# Patient Record
Sex: MALE | Race: WHITE | NOT HISPANIC OR LATINO | Employment: OTHER | URBAN - METROPOLITAN AREA
[De-identification: names, ages, dates, MRNs, and addresses within clinical notes are randomized per-mention and may not be internally consistent; named-entity substitution may affect disease eponyms.]

---

## 2018-06-04 ENCOUNTER — RADIANT APPOINTMENT (OUTPATIENT)
Dept: GENERAL RADIOLOGY | Facility: OTHER | Age: 48
End: 2018-06-04
Attending: PHYSICIAN ASSISTANT
Payer: COMMERCIAL

## 2018-06-04 ENCOUNTER — OFFICE VISIT (OUTPATIENT)
Dept: FAMILY MEDICINE | Facility: OTHER | Age: 48
End: 2018-06-04
Payer: COMMERCIAL

## 2018-06-04 VITALS
TEMPERATURE: 98.2 F | SYSTOLIC BLOOD PRESSURE: 120 MMHG | DIASTOLIC BLOOD PRESSURE: 82 MMHG | BODY MASS INDEX: 31.57 KG/M2 | RESPIRATION RATE: 18 BRPM | WEIGHT: 246 LBS | HEIGHT: 74 IN | HEART RATE: 76 BPM

## 2018-06-04 DIAGNOSIS — G89.29 CHRONIC PAIN OF RIGHT KNEE: ICD-10-CM

## 2018-06-04 DIAGNOSIS — M25.561 CHRONIC PAIN OF RIGHT KNEE: ICD-10-CM

## 2018-06-04 DIAGNOSIS — G89.29 CHRONIC PAIN OF RIGHT KNEE: Primary | ICD-10-CM

## 2018-06-04 DIAGNOSIS — M23.91 INTERNAL DERANGEMENT OF KNEE, RIGHT: ICD-10-CM

## 2018-06-04 DIAGNOSIS — M25.561 CHRONIC PAIN OF RIGHT KNEE: Primary | ICD-10-CM

## 2018-06-04 PROCEDURE — 73565 X-RAY EXAM OF KNEES: CPT | Mod: FY

## 2018-06-04 PROCEDURE — 99214 OFFICE O/P EST MOD 30 MIN: CPT | Performed by: PHYSICIAN ASSISTANT

## 2018-06-04 PROCEDURE — 73560 X-RAY EXAM OF KNEE 1 OR 2: CPT | Mod: RT

## 2018-06-04 ASSESSMENT — PAIN SCALES - GENERAL: PAINLEVEL: MODERATE PAIN (4)

## 2018-06-04 NOTE — PROGRESS NOTES
SUBJECTIVE:   Carlos Keyes is a 47 year old male who presents to clinic today for the following health issues:    HPI  Joint Pain    Onset: July 2017    Description:   Location: Right leg  Character: Dull ache, Stabbing and Seems to give out    Intensity: mild, severe    Progression of Symptoms: intermittent    Accompanying Signs & Symptoms:  Other symptoms: warmth, swelling, redness and Previous discoloration    History:   Previous similar pain: YES      Precipitating factors:   Trauma or overuse: YES    Alleviating factors:  Improved by: rest/inactivity    Therapies Tried and outcome: ibuprofen, Ice,       Problem list and histories reviewed & adjusted, as indicated.  Additional history: Recently had a rapid onset of a sharp stabbing pain to the medial aspect of his right knee while walking on level ground.  Uncertain as to overall reason for this and it puzzles them.  He has since then had pain in the knee and has had some swelling as well the posterior aspect of the knee is more involved.    Patient Active Problem List   Diagnosis     HYPERLIPIDEMIA LDL GOAL <130     Family history of ischemic heart disease     Epididymitis     Past Surgical History:   Procedure Laterality Date     FRACTURE TX, ANKLE RT/LT      Fracture TX Ankle RT/LT     HERNIA REPAIR, INGUINAL RT/LT  10/13/2006    Laparoscopic left inguinal hernia repair.     OPEN REDUCTION INTERNAL FIXATION FOREARM Right 11/5/2014    Procedure: OPEN REDUCTION INTERNAL FIXATION FOREARM;  Surgeon: Seth Basilio MD;  Location: [facility]     REPAIR HAMMER TOE  10/14/2011    Procedure:REPAIR HAMMER TOE; Correction Toes 4th,5th Toe Right Foot; Surgeon:ASIF CRUM; Location:[facility]     VASECTOMY Bilateral 05/06/05       Social History   Substance Use Topics     Smoking status: Never Smoker     Smokeless tobacco: Former User     Types: Chew     Alcohol use Yes      Comment: Occ     Family History   Problem Relation Age of Onset     C.A.D. Mother       "PAIGE Maternal Grandfather      DIABETES No family hx of      Cancer - colorectal No family hx of          Current Outpatient Prescriptions   Medication Sig Dispense Refill     albuterol (PROAIR HFA/PROVENTIL HFA/VENTOLIN HFA) 108 (90 BASE) MCG/ACT Inhaler Inhale 1-2 puffs into the lungs every 4 hours as needed for shortness of breath / dyspnea or wheezing 1 Inhaler 0     ibuprofen (ADVIL/MOTRIN) 800 MG tablet Take 1 tablet (800 mg) by mouth every 8 hours as needed for moderate pain 100 tablet 1     Allergies   Allergen Reactions     No Known Drug Allergies      Recent Labs   Lab Test  06/22/15   0928  11/27/14   1111  11/25/14   1355  01/14/14   0816  07/26/13   0923  09/11/12   0821   LDL  118   --    --   87  93   --    HDL  68   --    --   64  58   --    TRIG  191*   --    --   109  169*   --    ALT   --   17  22   --    --   48   CR   --    --   0.84   --    --    --    GFRESTIMATED   --    --   >90  Non  GFR Calc     --    --    --    GFRESTBLACK   --    --   >90   GFR Calc     --    --    --    POTASSIUM   --    --   3.9   --    --    --    TSH   --   2.74   --    --    --    --       BP Readings from Last 3 Encounters:   06/04/18 120/82   12/21/16 120/74   12/16/16 127/73    Wt Readings from Last 3 Encounters:   06/04/18 246 lb (111.6 kg)   12/21/16 254 lb 12 oz (115.6 kg)   03/07/16 250 lb (113.4 kg)          ROS:  Constitutional, HEENT, cardiovascular, pulmonary, gi and gu systems are negative, except as otherwise noted.    OBJECTIVE:     /82 (Cuff Size: Adult Large)  Pulse 76  Temp 98.2  F (36.8  C) (Temporal)  Resp 18  Ht 6' 2\" (1.88 m)  Wt 246 lb (111.6 kg)  BMI 31.58 kg/m2  Body mass index is 31.58 kg/(m^2).  GENERAL: healthy, alert and no distress  RESP: lungs clear to auscultation - no rales, rhonchi or wheezes  CV: regular rate and rhythm, normal S1 S2, no S3 or S4, no murmur, click or rub, no peripheral edema and peripheral pulses strong  MS: Very " well-developed and muscular extremities are noted in this gentleman.  Range of motion of the right knee is essentially normal although somewhat more limited than the left today.  There is some mild discomfort with Monica's sign being very mildly positive to the medial aspect of the right knee.  There does seem to be a little bit more of a fullness in the popliteal fossa.  He has a mildly positive apprehension test as well.  SKIN: no suspicious lesions or rashes to visible skin  NEURO: Normal strength and tone, mentation intact and speech normal  PSYCH: mentation appears normal, affect normal/bright    Diagnostic Test Results:  No results found for this or any previous visit (from the past 24 hour(s)).    X-ray: negative  for pathology today to my eye.  It will be overread by radiology.    ASSESSMENT/PLAN:     1. Chronic pain of right knee  2. Internal derangement of knee, right  He shows me a couple of pictures of the black and blue that he had to the posterior aspect of the right femur and upper tib-fib region around the knee and extending all the way up into the buttock area.  I am concerned that he may have suffered derangement to the knee and may be even to the hamstring insertion.  - XR Knee Standing Right 2 Views; Future  - MR Knee Right w/o Contrast; Future  - XR Knee AP Standing Bilateral; Future    Will have him get the MRI and evaluate this knee further.  It is my intent to more than likely place an orthopedic evaluation for this knee regardless of the MRI results.  The bruising that he shows me from picture on his phone gives me pause to consider more damage than what will be readily available on plain film x-ray for sure.    Rustam Coleman PA-C  [facility]

## 2018-06-04 NOTE — MR AVS SNAPSHOT
After Visit Summary   6/4/2018    Carlos Keyes    MRN: [id number]           Patient Information     Date Of Birth          1970        Visit Information        Provider Department      6/4/2018 2:40 PM Rustam Ozuna PA-C [facility]        Today's Diagnoses     Chronic pain of right knee    -  1    Internal derangement of knee, right           Follow-ups after your visit        Your next 10 appointments already scheduled     Jun 05, 2018  5:45 PM CDT   MR KNEE RIGHT W/O CONTRAST with [facility]   [facility]    [address] [id number] [phone]           Take your medicines as usual, unless your doctor tells you not to. Bring a list of your current medicines to your exam (including vitamins, minerals and over-the-counter drugs). Also bring the results of similar scans you may have had.  Please remove any body piercings and hair extensions before you arrive.  Follow your doctor s orders. If you do not, we may have to postpone your exam.  You may or may not receive IV contrast for this exam pending the discretion of the Radiologist.  You do not need to do anything special to prepare.  The MRI machine uses a strong magnet. Please wear clothes without metal (snaps, zippers). A sweatsuit works well, or we may give you a hospital gown.   **IMPORTANT** THE INSTRUCTIONS BELOW ARE ONLY FOR THOSE PATIENTS WHO HAVE BEEN PRESCRIBED SEDATION OR GENERAL ANESTHESIA DURING THEIR MRI PROCEDURE:  IF YOUR DOCTOR PRESCRIBED ORAL SEDATION (take medicine to help you relax during your exam):   You must get the medicine from your doctor (oral medication) before you arrive. Bring the medicine to the exam. Do not take it at home. You ll be told when to take it upon arriving for your exam.   Arrive one hour early. Bring someone who can take you home after the test. Your medicine will make you sleepy. After the exam, you may not drive, take a bus  or take a taxi by yourself.  IF YOUR DOCTOR PRESCRIBED IV SEDATION:   Arrive one hour early. Bring someone who can take you home after the test. Your medicine will make you sleepy. After the exam, you may not drive, take a bus or take a taxi by yourself.   No eating 6 hours before your exam. You may have clear liquids up until 4 hours before your exam. (Clear liquids include water, clear tea, black coffee and fruit juice without pulp.)  IF YOUR DOCTOR PRESCRIBED ANESTHESIA (be asleep for your exam):   Arrive 1 1/2 hours early. Bring someone who can take you home after the test. You may not drive, take a bus or take a taxi by yourself.   No eating 8 hours before your exam. You may have clear liquids up until 4 hours before your exam. (Clear liquids include water, clear tea, black coffee and fruit juice without pulp.)   You will spend four to five hours in the recovery room.  Please call the Imaging Department at your exam site with any questions.              Future tests that were ordered for you today     Open Future Orders        Priority Expected Expires Ordered    MR Knee Right w/o Contrast Routine  6/4/2019 6/4/2018            Who to contact     If you have questions or need follow up information about today's clinic visit or your schedule please contact [facility] directly at [phone].  Normal or non-critical lab and imaging results will be communicated to you by MyChart, letter or phone within 4 business days after the clinic has received the results. If you do not hear from us within 7 days, please contact the clinic through MyChart or phone. If you have a critical or abnormal lab result, we will notify you by phone as soon as possible.  Submit refill requests through [employer] or call your pharmacy and they will forward the refill request to us. Please allow 3 business days for your refill to be completed.          Additional Information About Your Visit        [employer] Information      "[employer] gives you secure access to your electronic health record. If you see a primary care provider, you can also send messages to your care team and make appointments. If you have questions, please call your primary care clinic.  If you do not have a primary care provider, please call [phone] and they will assist you.        Care EveryWhere ID     This is your Care EveryWhere ID. This could be used by other organizations to access your [facility] medical records  [id number]        Your Vitals Were     Pulse Temperature Respirations Height BMI (Body Mass Index)       76 98.2  F (36.8  C) (Temporal) 18 6' 2\" (1.88 m) 31.58 kg/m2        Blood Pressure from Last 3 Encounters:   06/04/18 120/82   12/21/16 120/74   12/16/16 127/73    Weight from Last 3 Encounters:   06/04/18 246 lb (111.6 kg)   12/21/16 254 lb 12 oz (115.6 kg)   03/07/16 250 lb (113.4 kg)               Primary Care Provider Office Phone # Fax #    Brianabel Wyatt Marks [phone] [address] [place] [address]        Equal Access to Services     ELVIS PADILLA AH: Hadii aad ku hadasho Soomaali, waaxda luqadaha, qaybta kaalmada adeegyada, marty euceda ah. So [facility] [phone].    ATENCIÓN: Si habla español, tiene a brennan disposición servicios gratuitos de asistencia lingüística. [facility] [phone].    We comply with applicable federal civil rights laws and [place] laws. We do not discriminate on the basis of race, color, national origin, age, disability, sex, sexual orientation, or gender identity.            Thank you!     Thank you for choosing [facility]  for your care. Our goal is always to provide you with excellent care. Hearing back from our patients is one way we can continue to improve our services. Please take a few minutes to complete the written survey that you may receive in the mail after your visit with us. Thank you!             Your Updated Medication List - " Protect others around you: Learn how to safely use, store and throw away your medicines at www.disposemymeds.org.          This list is accurate as of 6/4/18  3:37 PM.  Always use your most recent med list.                   Brand Name Dispense Instructions for use Diagnosis    albuterol 108 (90 Base) MCG/ACT Inhaler    PROAIR HFA/PROVENTIL HFA/VENTOLIN HFA    1 Inhaler    Inhale 1-2 puffs into the lungs every 4 hours as needed for shortness of breath / dyspnea or wheezing    Acute bronchitis with symptoms > 10 days       ibuprofen 800 MG tablet    ADVIL/MOTRIN    100 tablet    Take 1 tablet (800 mg) by mouth every 8 hours as needed for moderate pain    Epididymitis

## 2018-06-05 ENCOUNTER — HOSPITAL ENCOUNTER (OUTPATIENT)
Dept: MRI IMAGING | Facility: CLINIC | Age: 48
Discharge: HOME | End: 2018-06-05
Attending: PHYSICIAN ASSISTANT | Admitting: PHYSICIAN ASSISTANT
Payer: COMMERCIAL

## 2018-06-05 DIAGNOSIS — M23.91 INTERNAL DERANGEMENT OF KNEE, RIGHT: ICD-10-CM

## 2018-06-05 DIAGNOSIS — M25.561 CHRONIC PAIN OF RIGHT KNEE: ICD-10-CM

## 2018-06-05 DIAGNOSIS — G89.29 CHRONIC PAIN OF RIGHT KNEE: ICD-10-CM

## 2018-06-05 PROCEDURE — 73721 MRI JNT OF LWR EXTRE W/O DYE: CPT | Mod: RT

## 2018-06-07 ENCOUNTER — OFFICE VISIT (OUTPATIENT)
Dept: ORTHOPEDICS | Facility: CLINIC | Age: 48
End: 2018-06-07
Payer: COMMERCIAL

## 2018-06-07 VITALS
DIASTOLIC BLOOD PRESSURE: 76 MMHG | TEMPERATURE: 97.6 F | SYSTOLIC BLOOD PRESSURE: 142 MMHG | BODY MASS INDEX: 31.57 KG/M2 | WEIGHT: 246 LBS | HEART RATE: 87 BPM | HEIGHT: 74 IN

## 2018-06-07 DIAGNOSIS — S83.231A COMPLEX TEAR OF MEDIAL MENISCUS OF RIGHT KNEE AS CURRENT INJURY, INITIAL ENCOUNTER: Primary | ICD-10-CM

## 2018-06-07 DIAGNOSIS — S76.319S: ICD-10-CM

## 2018-06-07 PROCEDURE — 99244 OFF/OP CNSLTJ NEW/EST MOD 40: CPT | Performed by: ORTHOPAEDIC SURGERY

## 2018-06-07 ASSESSMENT — PAIN SCALES - GENERAL: PAINLEVEL: MILD PAIN (3)

## 2018-06-07 NOTE — PROGRESS NOTES
ORTHOPEDIC CLINIC CONSULT      Carlos Keyes is a 47 year old male who is seen in consultation at the request of Rustam Mustafa.  History of Present illness:  Carlos presents for evaluation of:   1.) Right knee pain    Onset:   Summer   2017  Symptoms brought on by Unsure of injury.   Character:  constant and dull ache.  Some swelling    Progression of symptoms:  Pain got better after summer of 2017 but last Friday he started having pain and instability.    Previous similar pain: no .   Pain Level:  3/10.   Previous treatments:  rest/inactivity, ice, immobilization and physical therapy.  Currently on Blood thinners? No  Diagnosis of Diabetes? No      Orthopedic Consult    The above note was reviewed and verified.  Patient's initial injury was waterskiing.  He fell and at the time of his he felt he had torn his hamstring.  He has a picture in his phone of the back of his leg which shows dramatic ecchymosis in the thigh and upper calf region.  He chose at that time to not treated formally.  Slowly over time his hamstring symptoms have decreased.  They have not completely resolved.    He did not necessarily recognize any significant knee symptoms however several days ago he had all of a sudden medial knee discomfort.  This is bothersome when he sits such as driving a car.  He also has a sense of instability.    Prior history of related problems:  Not applicable    Patient's past medical, surgical, social and family histories reviewed.     Past Medical History:   Diagnosis Date     Toe pain 1/29/2009       Past Surgical History:   Procedure Laterality Date     FRACTURE TX, ANKLE RT/LT      Fracture TX Ankle RT/LT     HERNIA REPAIR, INGUINAL RT/LT  10/13/2006    Laparoscopic left inguinal hernia repair.     OPEN REDUCTION INTERNAL FIXATION FOREARM Right 11/5/2014    Procedure: OPEN REDUCTION INTERNAL FIXATION FOREARM;  Surgeon: Seth Basilio MD;  Location: [facility]     REPAIR HAMMER TOE  10/14/2011    Procedure:REPAIR  "HAMMER TOE; Correction Toes 4th,5th Toe Right Foot; Surgeon:ASIF CRUM; Location:[facility]     VASECTOMY Bilateral 05/06/05       Medications:    Current Outpatient Prescriptions on File Prior to Visit:  albuterol (PROAIR HFA/PROVENTIL HFA/VENTOLIN HFA) 108 (90 BASE) MCG/ACT Inhaler Inhale 1-2 puffs into the lungs every 4 hours as needed for shortness of breath / dyspnea or wheezing   ibuprofen (ADVIL/MOTRIN) 800 MG tablet Take 1 tablet (800 mg) by mouth every 8 hours as needed for moderate pain     No current facility-administered medications on file prior to visit.     Allergies   Allergen Reactions     No Known Drug Allergies        Social History     Occupational History     Self employed.      Social History Main Topics     Smoking status: Never Smoker     Smokeless tobacco: Former User     Types: Chew     Alcohol use Yes      Comment: Occ     Drug use: No     Sexual activity: Yes     Partners: Female       Family History   Problem Relation Age of Onset     C.A.D. Mother      C.A.D. Maternal Grandfather      DIABETES No family hx of      Cancer - colorectal No family hx of        REVIEW OF SYSTEMS    General: negative for fever or fatigue    Psych:  negative for anxiety or depression     Ophthalmic:  Corrective lenses?  No    ENT:  Hearing difficulty? No    CV: negative for chest pain, venous insuffiencey     Endocrine:  negative for diabetes     Urology:  negative for kidney disease    Resp:  Normal respiratory effort     Skin: negative for cuts/sores or redness    Musculoskeletal: as above    Neurologic:negative for numbness/tingling    Hematologic: negative for bleeding disorder, does not use of prescription anticoagulants         Physical Exam:    Vitals: /76 (BP Location: Left arm, Patient Position: Chair, Cuff Size: Adult Large)  Pulse 87  Temp 97.6  F (36.4  C) (Temporal)  Ht 1.88 m (6' 2\")  Wt 111.6 kg (246 lb)  BMI 31.58 kg/m2  BMI= Body mass index is 31.58 kg/(m^2).    GENERAL " APPEARANCE:  Healthy, alert, no distress    SKIN:  negative for suspicious lesions or rashes    NEURO: Normal strength and tone, mentation intact and speech normal    PSYCH:   Mentation appears Normal and affect normal/bright    RESPIRATORY: negative for respiratory distress.    EYES: negative for Conjunctivitis    Cardiovascular: no Edema                dorsalis pedis Present                Toes warm and well perfused, brisk capillary refill.      GAIT: antalgic    JOINT/EXTREMITIES:    Seated posturing shows that he has tenderness over the medial aspect of the right knee joint which is extremely easy to reproduce.  It is more uncomfortable as a work posteriorly.    Calf is soft and not swollen.    Supine examination shows the right knee will come to full extension equal to the left.  Right and left knee flexion is fairly symmetrical.  On the right side there is a little tightness as he flexes the right knee but he admits that this gets he is here with persistence of the flexion.    Stability testing the right knee is unremarkable in all respects as compared to the left.    Echeverria's maneuvers are equivocal they consistently cause medial knee discomfort.    Prone exam was performed to assess his hamstrings.  His right thigh shows a large fullness in the proximal portion of the hamstring.  As we stress his hamstrings through knee flexion against resistance the pathway of the hamstrings on the right side is not easily palpated.  It is easily palpated on the opposite side.  This may be an indication of a hamstrings a avulsion injury.      Radiographs: We have x-rays of his right knee AP and lateral standing.  We have an MRI of his right knee that was done 2 days ago.  His standing x-rays show relatively healthy knee joint.  There is some minor subluxation of the femur medially and perhaps some minor flattening of the joint surfaces.  The MRI shows evidence of a medial meniscus tear with a flap that does indeed appear  to have been displaced into the notch.  The body of the medial meniscus is significantly altered.  There does not appear to be major ligament damage.    Independent visualization of the images was performed.    Impression:     ICD-10-CM    1. Complex tear of medial meniscus of right knee as current injury, initial encounter S83.231A    2. Partial hamstring tear, sequela S76.319S        Patient's knee pain appears to be related to a medial meniscus tear.  This tear most likely was associated with his waterskiing injury from last year and most recently probably displaced the flap.  The meniscus does not look as though it has the potential for being repaired.  So our treatment would be focused on performing something that might alleviate pain.      Patient has a hamstring tear that is at least a year old.  Despite what appears to be a fairly significant tear he is not having symptoms directly related to it that is he does not notice the weakness that might be associated with the big tear and is not having any neurologic symptoms.            Plan:  The above was reviewed with Carlos    With respect to his knee problem I suggest that this is an instance where we usually recommend arthroscopy in order to remove the portion of the meniscus that is being irritated and causing his pain.  I explained to him the fact that the meniscus is damaged and not functioning normally and that this is a negative effect on the long-term health of his knee.  We briefly discussed the surgical approach and the anticipated recovery time.  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    With respect to the hamstring tear we did explain what I think could be going on.  I believe that the smarter approach to the hamstring is to simply proceed to the stretching and strengthening program that he would normally offer.  This would best be offered after his knees taken care of.    At this point in time he has some things he needs to juggle at work in order to get the  time free to do the surgery on his right knee.  He will therefore call us when he is prepared for this.    Please schedule for surgery, pre op H&P, and post ops.      Patient Name:  Carlos Keyes ([id number]).  :  1970  Gender:  male  Patient Type:  Same Day Surgery  Surgeon:  Seth Basilio MD  Physician requests assist from:  PA    Procedures:    Right knee arthroscopy with medial meniscus debridement  Approach:  NA  Diagnosis:     Complex tear of medial meniscus of right knee as current injury, initial encounter    C-arm:  No  Mini C-arm:   No  Pathology Scheduled:  No  Special instruments/supplies: No  Vendor Rep: No  Anesthesia:  General  Block:  No   Block type:   Time needed:  30 minutes    [facility] Discussed:  Not Applicable    Post op 1:        \      Return to clinic PRN    Seth Basilio MD

## 2018-06-07 NOTE — MR AVS SNAPSHOT
"              After Visit Summary   6/7/2018    Carlos Keyes    MRN: [id number]           Patient Information     Date Of Birth          1970        Visit Information        Provider Department      6/7/2018 7:40 AM Seth Basilio MD [facility]        Today's Diagnoses     Complex tear of medial meniscus of right knee as current injury, initial encounter    -  1    Partial hamstring tear, sequela           Follow-ups after your visit        Who to contact     If you have questions or need follow up information about today's clinic visit or your schedule please contact [facility] directly at [phone].  Normal or non-critical lab and imaging results will be communicated to you by MyChart, letter or phone within 4 business days after the clinic has received the results. If you do not hear from us within 7 days, please contact the clinic through "[employer]t or phone. If you have a critical or abnormal lab result, we will notify you by phone as soon as possible.  Submit refill requests through [employer] or call your pharmacy and they will forward the refill request to us. Please allow 3 business days for your refill to be completed.          Additional Information About Your Visit        MyChart Information     [employer] gives you secure access to your electronic health record. If you see a primary care provider, you can also send messages to your care team and make appointments. If you have questions, please call your primary care clinic.  If you do not have a primary care provider, please call [phone] and they will assist you.        Care EveryWhere ID     This is your Care EveryWhere ID. This could be used by other organizations to access your [place] medical records  [id number]        Your Vitals Were     Pulse Temperature Height BMI (Body Mass Index)          87 97.6  F (36.4  C) (Temporal) 1.88 m (6' 2\") 31.58 kg/m2         Blood Pressure from Last 3 Encounters: "   06/07/18 142/76   06/04/18 120/82   12/21/16 120/74    Weight from Last 3 Encounters:   06/07/18 111.6 kg (246 lb)   06/04/18 111.6 kg (246 lb)   12/21/16 115.6 kg (254 lb 12 oz)              Today, you had the following     No orders found for display       Primary Care Provider Office Phone # Fax #    Brianabel Wyatt Marks [phone]       [address] [place] [address]        Equal Access to Services     [facility]: Hadii aad ku hadasho Soomaali, waaxda luqadaha, qaybta kaalmada adeegyada, waxarun euceda . So [facility] [phone].    ATENCIÓN: Si habla español, tiene a brennan disposición servicios gratuitos de asistencia lingüística. [facility] [phone].    We comply with applicable federal civil rights laws and [place] laws. We do not discriminate on the basis of race, color, national origin, age, disability, sex, sexual orientation, or gender identity.            Thank you!     Thank you for choosing [facility]  for your care. Our goal is always to provide you with excellent care. Hearing back from our patients is one way we can continue to improve our services. Please take a few minutes to complete the written survey that you may receive in the mail after your visit with us. Thank you!             Your Updated Medication List - Protect others around you: Learn how to safely use, store and throw away your medicines at www.disposemymeds.org.          This list is accurate as of 6/7/18  8:33 AM.  Always use your most recent med list.                   Brand Name Dispense Instructions for use Diagnosis    albuterol 108 (90 Base) MCG/ACT Inhaler    PROAIR HFA/PROVENTIL HFA/VENTOLIN HFA    1 Inhaler    Inhale 1-2 puffs into the lungs every 4 hours as needed for shortness of breath / dyspnea or wheezing    Acute bronchitis with symptoms > 10 days       ibuprofen 800 MG tablet    ADVIL/MOTRIN    100 tablet    Take 1 tablet (800 mg) by mouth every 8  hours as needed for moderate pain    Epididymitis

## 2018-06-07 NOTE — LETTER
6/7/2018         RE: Carlos Keyes  [address]  [facility] [address]        Dear Colleague,    Thank you for referring your patient, Carlos Keyes, to the [facility]. Please see a copy of my visit note below.    ORTHOPEDIC CLINIC CONSULT      Carlos Keyes is a 47 year old male who is seen in consultation at the request of Rustam Mustafa.  History of Present illness:  Carlos presents for evaluation of:   1.) Right knee pain    Onset:   Summer   2017  Symptoms brought on by Unsure of injury.   Character:  constant and dull ache.  Some swelling    Progression of symptoms:  Pain got better after summer of 2017 but last Friday he started having pain and instability.    Previous similar pain: no .   Pain Level:  3/10.   Previous treatments:  rest/inactivity, ice, immobilization and physical therapy.  Currently on Blood thinners? No  Diagnosis of Diabetes? No      Orthopedic Consult    The above note was reviewed and verified.  Patient's initial injury was waterskiing.  He fell and at the time of his he felt he had torn his hamstring.  He has a picture in his phone of the back of his leg which shows dramatic ecchymosis in the thigh and upper calf region.  He chose at that time to not treated formally.  Slowly over time his hamstring symptoms have decreased.  They have not completely resolved.    He did not necessarily recognize any significant knee symptoms however several days ago he had all of a sudden medial knee discomfort.  This is bothersome when he sits such as driving a car.  He also has a sense of instability.    Prior history of related problems:  Not applicable    Patient's past medical, surgical, social and family histories reviewed.     Past Medical History:   Diagnosis Date     Toe pain 1/29/2009       Past Surgical History:   Procedure Laterality Date     FRACTURE TX, ANKLE RT/LT      Fracture TX Ankle RT/LT     HERNIA REPAIR, INGUINAL RT/LT  10/13/2006    Laparoscopic left inguinal  hernia repair.     OPEN REDUCTION INTERNAL FIXATION FOREARM Right 11/5/2014    Procedure: OPEN REDUCTION INTERNAL FIXATION FOREARM;  Surgeon: Seth Basilio MD;  Location: [facility]     REPAIR HAMMER TOE  10/14/2011    Procedure:REPAIR HAMMER TOE; Correction Toes 4th,5th Toe Right Foot; Surgeon:ASIF CRUM; Location:[facility]     VASECTOMY Bilateral 05/06/05       Medications:    Current Outpatient Prescriptions on File Prior to Visit:  albuterol (PROAIR HFA/PROVENTIL HFA/VENTOLIN HFA) 108 (90 BASE) MCG/ACT Inhaler Inhale 1-2 puffs into the lungs every 4 hours as needed for shortness of breath / dyspnea or wheezing   ibuprofen (ADVIL/MOTRIN) 800 MG tablet Take 1 tablet (800 mg) by mouth every 8 hours as needed for moderate pain     No current facility-administered medications on file prior to visit.     Allergies   Allergen Reactions     No Known Drug Allergies        Social History     Occupational History     Self employed.      Social History Main Topics     Smoking status: Never Smoker     Smokeless tobacco: Former User     Types: Chew     Alcohol use Yes      Comment: Occ     Drug use: No     Sexual activity: Yes     Partners: Female       Family History   Problem Relation Age of Onset     C.A.D. Mother      C.A.D. Maternal Grandfather      DIABETES No family hx of      Cancer - colorectal No family hx of        REVIEW OF SYSTEMS    General: negative for fever or fatigue    Psych:  negative for anxiety or depression     Ophthalmic:  Corrective lenses?  No    ENT:  Hearing difficulty? No    CV: negative for chest pain, venous insuffiencey     Endocrine:  negative for diabetes     Urology:  negative for kidney disease    Resp:  Normal respiratory effort     Skin: negative for cuts/sores or redness    Musculoskeletal: as above    Neurologic:negative for numbness/tingling    Hematologic: negative for bleeding disorder, does not use of prescription anticoagulants         Physical Exam:    Vitals: /76  "(BP Location: Left arm, Patient Position: Chair, Cuff Size: Adult Large)  Pulse 87  Temp 97.6  F (36.4  C) (Temporal)  Ht 1.88 m (6' 2\")  Wt 111.6 kg (246 lb)  BMI 31.58 kg/m2  BMI= Body mass index is 31.58 kg/(m^2).    GENERAL APPEARANCE:  Healthy, alert, no distress    SKIN:  negative for suspicious lesions or rashes    NEURO: Normal strength and tone, mentation intact and speech normal    PSYCH:   Mentation appears Normal and affect normal/bright    RESPIRATORY: negative for respiratory distress.    EYES: negative for Conjunctivitis    Cardiovascular: no Edema                dorsalis pedis Present                Toes warm and well perfused, brisk capillary refill.      GAIT: antalgic    JOINT/EXTREMITIES:    Seated posturing shows that he has tenderness over the medial aspect of the right knee joint which is extremely easy to reproduce.  It is more uncomfortable as a work posteriorly.    Calf is soft and not swollen.    Supine examination shows the right knee will come to full extension equal to the left.  Right and left knee flexion is fairly symmetrical.  On the right side there is a little tightness as he flexes the right knee but he admits that this gets he is here with persistence of the flexion.    Stability testing the right knee is unremarkable in all respects as compared to the left.    Echeverria's maneuvers are equivocal they consistently cause medial knee discomfort.    Prone exam was performed to assess his hamstrings.  His right thigh shows a large fullness in the proximal portion of the hamstring.  As we stress his hamstrings through knee flexion against resistance the pathway of the hamstrings on the right side is not easily palpated.  It is easily palpated on the opposite side.  This may be an indication of a hamstrings a avulsion injury.      Radiographs: We have x-rays of his right knee AP and lateral standing.  We have an MRI of his right knee that was done 2 days ago.  His standing x-rays " show relatively healthy knee joint.  There is some minor subluxation of the femur medially and perhaps some minor flattening of the joint surfaces.  The MRI shows evidence of a medial meniscus tear with a flap that does indeed appear to have been displaced into the notch.  The body of the medial meniscus is significantly altered.  There does not appear to be major ligament damage.    Independent visualization of the images was performed.    Impression:     ICD-10-CM    1. Complex tear of medial meniscus of right knee as current injury, initial encounter S83.231A    2. Partial hamstring tear, sequela S76.319S        Patient's knee pain appears to be related to a medial meniscus tear.  This tear most likely was associated with his waterskiing injury from last year and most recently probably displaced the flap.  The meniscus does not look as though it has the potential for being repaired.  So our treatment would be focused on performing something that might alleviate pain.      Patient has a hamstring tear that is at least a year old.  Despite what appears to be a fairly significant tear he is not having symptoms directly related to it that is he does not notice the weakness that might be associated with the big tear and is not having any neurologic symptoms.            Plan:  The above was reviewed with Carlos    With respect to his knee problem I suggest that this is an instance where we usually recommend arthroscopy in order to remove the portion of the meniscus that is being irritated and causing his pain.  I explained to him the fact that the meniscus is damaged and not functioning normally and that this is a negative effect on the long-term health of his knee.  We briefly discussed the surgical approach and the anticipated recovery time.  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    With respect to the hamstring tear we did explain what I think could be going on.  I believe that the smarter approach to the hamstring is to  simply proceed to the stretching and strengthening program that he would normally offer.  This would best be offered after his knees taken care of.    At this point in time he has some things he needs to juggle at work in order to get the time free to do the surgery on his right knee.  He will therefore call us when he is prepared for this.    Please schedule for surgery, pre op H&P, and post ops.      Patient Name:  Carlos Keyes ([id number]).  :  1970  Gender:  male  Patient Type:  Same Day Surgery  Surgeon:  Seth Basilio MD  Physician requests assist from:  PA    Procedures:    Right knee arthroscopy with medial meniscus debridement  Approach:  NA  Diagnosis:     Complex tear of medial meniscus of right knee as current injury, initial encounter    C-arm:  No  Mini C-arm:   No  Pathology Scheduled:  No  Special instruments/supplies: No  Vendor Rep: No  Anesthesia:  General  Block:  No   Block type:   Time needed:  30 minutes    [facility] Discussed:  Not Applicable    Post op 1:        \      Return to clinic PRN    Seth Basilio MD                    Again, thank you for allowing me to participate in the care of your patient.        Sincerely,        Seth Basilio MD

## 2018-07-18 ENCOUNTER — TELEPHONE (OUTPATIENT)
Dept: FAMILY MEDICINE | Facility: CLINIC | Age: 48
End: 2018-07-18

## 2018-07-18 ENCOUNTER — OFFICE VISIT (OUTPATIENT)
Dept: FAMILY MEDICINE | Facility: CLINIC | Age: 48
End: 2018-07-18
Payer: COMMERCIAL

## 2018-07-18 VITALS
WEIGHT: 246.8 LBS | OXYGEN SATURATION: 98 % | SYSTOLIC BLOOD PRESSURE: 126 MMHG | DIASTOLIC BLOOD PRESSURE: 74 MMHG | HEART RATE: 74 BPM | TEMPERATURE: 97.4 F | BODY MASS INDEX: 31.69 KG/M2

## 2018-07-18 DIAGNOSIS — Z01.818 PREOP GENERAL PHYSICAL EXAM: Primary | ICD-10-CM

## 2018-07-18 DIAGNOSIS — E78.5 HYPERLIPIDEMIA LDL GOAL <130: Primary | ICD-10-CM

## 2018-07-18 DIAGNOSIS — S83.241D TEAR OF MEDIAL MENISCUS OF RIGHT KNEE, CURRENT, UNSPECIFIED TEAR TYPE, SUBSEQUENT ENCOUNTER: ICD-10-CM

## 2018-07-18 LAB
ANION GAP SERPL CALCULATED.3IONS-SCNC: 8 MMOL/L (ref 3–14)
BUN SERPL-MCNC: 16 MG/DL (ref 7–30)
CALCIUM SERPL-MCNC: 9.2 MG/DL (ref 8.5–10.1)
CHLORIDE SERPL-SCNC: 102 MMOL/L (ref 94–109)
CHOLEST SERPL-MCNC: 282 MG/DL
CO2 SERPL-SCNC: 30 MMOL/L (ref 20–32)
CREAT SERPL-MCNC: 1 MG/DL (ref 0.66–1.25)
GFR SERPL CREATININE-BSD FRML MDRD: 79 ML/MIN/1.7M2
GLUCOSE SERPL-MCNC: 97 MG/DL (ref 70–99)
HDLC SERPL-MCNC: 61 MG/DL
LDLC SERPL CALC-MCNC: 181 MG/DL
NONHDLC SERPL-MCNC: 221 MG/DL
POTASSIUM SERPL-SCNC: 4.4 MMOL/L (ref 3.4–5.3)
SODIUM SERPL-SCNC: 140 MMOL/L (ref 133–144)
TRIGL SERPL-MCNC: 199 MG/DL

## 2018-07-18 PROCEDURE — 80061 LIPID PANEL: CPT | Performed by: FAMILY MEDICINE

## 2018-07-18 PROCEDURE — 80048 BASIC METABOLIC PNL TOTAL CA: CPT | Performed by: FAMILY MEDICINE

## 2018-07-18 PROCEDURE — 36415 COLL VENOUS BLD VENIPUNCTURE: CPT | Performed by: FAMILY MEDICINE

## 2018-07-18 PROCEDURE — 99214 OFFICE O/P EST MOD 30 MIN: CPT | Performed by: FAMILY MEDICINE

## 2018-07-18 ASSESSMENT — PAIN SCALES - GENERAL: PAINLEVEL: MODERATE PAIN (4)

## 2018-07-18 NOTE — TELEPHONE ENCOUNTER
Patient would like to go back on the medication if you could send it to maren Josue MA 7/18/2018

## 2018-07-18 NOTE — TELEPHONE ENCOUNTER
----- Message from Darek Marks MD sent at 7/18/2018  5:33 PM CDT -----  Please call the patient with the results.  Cholesterol is quite high off his medication.  He may need to be back on his simvastatin     Darek Marks MD

## 2018-07-18 NOTE — TELEPHONE ENCOUNTER
Patient is here in clinic and has some questions regarding his surgery.  Please give him a call at [phone].       Kajal Moncada, CMA

## 2018-07-18 NOTE — MR AVS SNAPSHOT
After Visit Summary   7/18/2018    Carlos Keyes    MRN: [id number]           Patient Information     Date Of Birth          1970        Visit Information        Provider Department      7/18/2018 10:20 AM Darek Marks MD [facility]        Today's Diagnoses     Preop general physical exam    -  1    Tear of medial meniscus of right knee, current, unspecified tear type, subsequent encounter          Care Instructions      Before Your Surgery      Call your surgeon if there is any change in your health. This includes signs of a cold or flu (such as a sore throat, runny nose, cough, rash or fever).    Do not smoke, drink alcohol or take over the counter medicine (unless your surgeon or primary care doctor tells you to) for the 24 hours before and after surgery.    If you take prescribed drugs: Follow your doctor s orders about which medicines to take and which to stop until after surgery.    Eating and drinking prior to surgery: follow the instructions from your surgeon    Take a shower or bath the night before surgery. Use the soap your surgeon gave you to gently clean your skin. If you do not have soap from your surgeon, use your regular soap. Do not shave or scrub the surgery site.  Wear clean pajamas and have clean sheets on your bed.           Follow-ups after your visit        Your next 10 appointments already scheduled     Jul 25, 2018   Procedure with Seth Basilio MD   [facility] ([facility])    [address] [place] [address] [address] [place] [address]   [phone]           From [address]: Exit at [address] on [address] side of [place]. Turn right on [address]. Turn left at stoplight on [address]. [facility] will be in view two blocks ahead            Aug 07, 2018  7:30 AM CDT   [facility]    [address] [place]  [id number] [phone]              Who to contact     If you have questions or need follow up information about today's clinic visit or your schedule please contact [facility] directly at [phone].  Normal or non-critical lab and imaging results will be communicated to you by MyChart, letter or phone within 4 business days after the clinic has received the results. If you do not hear from us within 7 days, please contact the clinic through [employer] or phone. If you have a critical or abnormal lab result, we will notify you by phone as soon as possible.  Submit refill requests through [employer] or call your pharmacy and they will forward the refill request to us. Please allow 3 business days for your refill to be completed.          Additional Information About Your Visit        [employer] Information     [employer] gives you secure access to your electronic health record. If you see a primary care provider, you can also send messages to your care team and make appointments. If you have questions, please call your primary care clinic.  If you do not have a primary care provider, please call [phone] and they will assist you.        Care EveryWhere ID     This is your Care EveryWhere ID. This could be used by other organizations to access your [place] medical records  [id number]        Your Vitals Were     Pulse Temperature Pulse Oximetry BMI (Body Mass Index)          74 97.4  F (36.3  C) (Temporal) 98% 31.69 kg/m2         Blood Pressure from Last 3 Encounters:   07/18/18 126/74   06/07/18 142/76   06/04/18 120/82    Weight from Last 3 Encounters:   07/18/18 246 lb 12.8 oz (111.9 kg)   06/07/18 246 lb (111.6 kg)   06/04/18 246 lb (111.6 kg)              We Performed the Following     Basic metabolic panel     Lipid panel reflex to direct LDL Fasting          Today's Medication Changes          These changes are accurate as of 7/18/18  5:06 PM.  If you have any questions, ask your nurse or  doctor.               Stop taking these medicines if you haven't already. Please contact your care team if you have questions.     ibuprofen 800 MG tablet   Commonly known as:  ADVIL/MOTRIN   Stopped by:  Darek Marks MD                    Primary Care Provider Office Phone # Fax #    Darek Marks [phone] [address] [place] [address]        Equal Access to Services     [facility]: Hadii aad ku hadasho Soomaali, waaxda luqadaha, qaybta kaalmada adeegyada, waxay idiin hayaan adeeg kharash la'aan ah. So [facility] [phone].    ATENCIÓN: Si habla español, tiene a brennan disposición servicios gratuitos de asistencia lingüística. Llame al [phone].    We comply with applicable federal civil rights laws and [place] laws. We do not discriminate on the basis of race, color, national origin, age, disability, sex, sexual orientation, or gender identity.            Thank you!     Thank you for choosing [facility]  for your care. Our goal is always to provide you with excellent care. Hearing back from our patients is one way we can continue to improve our services. Please take a few minutes to complete the written survey that you may receive in the mail after your visit with us. Thank you!             Your Updated Medication List - Protect others around you: Learn how to safely use, store and throw away your medicines at www.disposemymeds.org.      Notice  As of 7/18/2018  5:06 PM    You have not been prescribed any medications.

## 2018-07-18 NOTE — PROGRESS NOTES
[address] [address]  [phone]  Dept: [phone]    PRE-OP EVALUATION:  Today's date: 2018    Carlos Keyes (: 1970) presents for pre-operative evaluation assessment as requested by Dr. Basilio.  He requires evaluation and anesthesia risk assessment prior to undergoing surgery/procedure for treatment of Arthroscopy knee with dilan mcallister  .    Fax number for surgical facility:   Primary Physician: Darek Marks  Type of Anesthesia Anticipated: General    Patient has a Health Care Directive or Living Will:  YES on file    Preop Questions 2018   Who is doing your surgery? silvinabial   What are you having done? right knee scope   Date of Surgery/Procedure:    Facility or Hospital where procedure/surgery will be performed: [facility]-not sure on which location   1.  Do you have a history of Heart attack, stroke, stent, coronary bypass surgery, or other heart surgery? No   2.  Do you ever have any pain or discomfort in your chest? No   3.  Do you have a history of  Heart Failure? No   4.   Are you troubled by shortness of breath when:  walking on a level surface, or up a slight hill, or at night? No   5.  Do you currently have a cold, bronchitis or other respiratory infection? No   6.  Do you have a cough, shortness of breath, or wheezing? No   7.  Do you sometimes get pains in the calves of your legs when you walk? No   8. Do you or anyone in your family have previous history of blood clots? No   9.  Do you or does anyone in your family have a serious bleeding problem such as prolonged bleeding following surgeries or cuts? No   10. Have you ever had problems with anemia or been told to take iron pills? No   11. Have you had any abnormal blood loss such as black, tarry or bloody stools? No   12. Have you ever had a blood transfusion? No   13. Have you or any of your relatives ever had problems with anesthesia? No   14. Do you have  sleep apnea, excessive snoring or daytime drowsiness? No   15. Do you have any prosthetic heart valves? No   16. Do you have prosthetic joints? No         HPI:     HPI related to upcoming procedure: having knee scope    Can get up a single flight of stairs without dyspnea. Estimated METS > 4.      See problem list for active medical problems.  Problems all longstanding and stable, except as noted/documented.  See ROS for pertinent symptoms related to these conditions.                                                                                                                                                          .    MEDICAL HISTORY:     Patient Active Problem List    Diagnosis Date Noted     Epididymitis 2016     Priority: Medium     Family history of ischemic heart disease 2015     Priority: Medium     Mom  age 55       HYPERLIPIDEMIA LDL GOAL <130 10/31/2010     Priority: Medium      Past Medical History:   Diagnosis Date     Toe pain 2009     Past Surgical History:   Procedure Laterality Date     FRACTURE TX, ANKLE RT/LT      Fracture TX Ankle RT/LT     HERNIA REPAIR, INGUINAL RT/LT  10/13/2006    Laparoscopic left inguinal hernia repair.     OPEN REDUCTION INTERNAL FIXATION FOREARM Right 2014    Procedure: OPEN REDUCTION INTERNAL FIXATION FOREARM;  Surgeon: Seth Basilio MD;  Location: [facility]     REPAIR HAMMER TOE  10/14/2011    Procedure:REPAIR HAMMER TOE; Correction Toes 4th,5th Toe Right Foot; Surgeon:ASIF CRUM; Location:[facility]     VASECTOMY Bilateral 05     No current outpatient prescriptions on file.     OTC products: None, except as noted above    Allergies   Allergen Reactions     No Known Drug Allergies       Latex Allergy: NO    Social History   Substance Use Topics     Smoking status: Never Smoker     Smokeless tobacco: Former User     Types: Chew     Alcohol use Yes      Comment: Occ     History   Drug Use No       REVIEW OF SYSTEMS:    CONSTITUTIONAL: NEGATIVE for fever, chills, change in weight  ENT/MOUTH: NEGATIVE for ear, mouth and throat problems  RESP: NEGATIVE for significant cough or SOB  CV: NEGATIVE for chest pain, palpitations or peripheral edema    EXAM:   /74 (BP Location: Right arm, Patient Position: Chair, Cuff Size: Adult Regular)  Pulse 74  Temp 97.4  F (36.3  C) (Temporal)  Wt 246 lb 12.8 oz (111.9 kg)  SpO2 98%  BMI 31.69 kg/m2  GENERAL APPEARANCE: healthy, alert and no distress  HENT: ear canals and TM's normal and nose and mouth without ulcers or lesions  RESP: lungs clear to auscultation - no rales, rhonchi or wheezes  CV: regular rate and rhythm, normal S1 S2, no S3 or S4 and no murmur, click or rub   ABDOMEN: soft, nontender, no HSM or masses and bowel sounds normal  NEURO: Normal strength and tone, sensory exam grossly normal, mentation intact and speech normal    DIAGNOSTICS:   EKG: Not indicated due to non-vascular surgery and low risk of event (age <65 and without cardiac risk factors)    Recent Labs   Lab Test  12/04/14   0903  11/27/14   1111  11/25/14   1355   HGB  13.9  13.0*  14.0   PLT  296  234  252   NA   --    --   140   POTASSIUM   --    --   3.9   CR   --    --   0.84        IMPRESSION:       The proposed surgical procedure is considered LOW risk.    REVISED CARDIAC RISK INDEX  The patient has the following serious cardiovascular risks for perioperative complications such as (MI, PE, VFib and 3  AV Block):  No serious cardiac risks  INTERPRETATION: 0 risks: Class I (very low risk - 0.4% complication rate)    The patient has the following additional risks for perioperative complications:  No identified additional risks      ICD-10-CM    1. Preop general physical exam Z01.818 Lipid panel reflex to direct LDL Fasting     Basic metabolic panel       RECOMMENDATIONS:             APPROVAL GIVEN to proceed with proposed procedure, without further diagnostic evaluation       Signed Electronically by: Darek  LOLIS Marks MD    Copy of this evaluation report is provided to requesting physician.    [facility] Preop Guidelines    Revised Cardiac Risk Index

## 2018-07-18 NOTE — NURSING NOTE
Health Maintenance Due   Topic Date Due     PHQ-2 Q1 YR  09/06/1980     HIV SCREEN (SYSTEM ASSIGNED)  09/06/1986       Health Maintenance reviewed at today's visit patient asked to schedule/complete:   Patient is aware.

## 2018-07-20 RX ORDER — SIMVASTATIN 20 MG
20 TABLET ORAL AT BEDTIME
Qty: 90 TABLET | Refills: 3 | Status: SHIPPED | OUTPATIENT
Start: 2018-07-20 | End: 2019-08-23

## 2018-07-24 NOTE — H&P (VIEW-ONLY)
[address] [address]  [phone]  Dept: [phone]    PRE-OP EVALUATION:  Today's date: 2018    Carlos Keyes (: 1970) presents for pre-operative evaluation assessment as requested by Dr. Basilio.  He requires evaluation and anesthesia risk assessment prior to undergoing surgery/procedure for treatment of Arthroscopy knee with dilan mcallister  .    Fax number for surgical facility:   Primary Physician: Darek Marks  Type of Anesthesia Anticipated: General    Patient has a Health Care Directive or Living Will:  YES on file    Preop Questions 2018   Who is doing your surgery? silvinabial   What are you having done? right knee scope   Date of Surgery/Procedure:    Facility or Hospital where procedure/surgery will be performed: [facility]-not sure on which location   1.  Do you have a history of Heart attack, stroke, stent, coronary bypass surgery, or other heart surgery? No   2.  Do you ever have any pain or discomfort in your chest? No   3.  Do you have a history of  Heart Failure? No   4.   Are you troubled by shortness of breath when:  walking on a level surface, or up a slight hill, or at night? No   5.  Do you currently have a cold, bronchitis or other respiratory infection? No   6.  Do you have a cough, shortness of breath, or wheezing? No   7.  Do you sometimes get pains in the calves of your legs when you walk? No   8. Do you or anyone in your family have previous history of blood clots? No   9.  Do you or does anyone in your family have a serious bleeding problem such as prolonged bleeding following surgeries or cuts? No   10. Have you ever had problems with anemia or been told to take iron pills? No   11. Have you had any abnormal blood loss such as black, tarry or bloody stools? No   12. Have you ever had a blood transfusion? No   13. Have you or any of your relatives ever had problems with anesthesia? No   14. Do you have  sleep apnea, excessive snoring or daytime drowsiness? No   15. Do you have any prosthetic heart valves? No   16. Do you have prosthetic joints? No         HPI:     HPI related to upcoming procedure: having knee scope    Can get up a single flight of stairs without dyspnea. Estimated METS > 4.      See problem list for active medical problems.  Problems all longstanding and stable, except as noted/documented.  See ROS for pertinent symptoms related to these conditions.                                                                                                                                                          .    MEDICAL HISTORY:     Patient Active Problem List    Diagnosis Date Noted     Epididymitis 2016     Priority: Medium     Family history of ischemic heart disease 2015     Priority: Medium     Mom  age 55       HYPERLIPIDEMIA LDL GOAL <130 10/31/2010     Priority: Medium      Past Medical History:   Diagnosis Date     Toe pain 2009     Past Surgical History:   Procedure Laterality Date     FRACTURE TX, ANKLE RT/LT      Fracture TX Ankle RT/LT     HERNIA REPAIR, INGUINAL RT/LT  10/13/2006    Laparoscopic left inguinal hernia repair.     OPEN REDUCTION INTERNAL FIXATION FOREARM Right 2014    Procedure: OPEN REDUCTION INTERNAL FIXATION FOREARM;  Surgeon: Seth Basilio MD;  Location: [facility]     REPAIR HAMMER TOE  10/14/2011    Procedure:REPAIR HAMMER TOE; Correction Toes 4th,5th Toe Right Foot; Surgeon:ASIF CRUM; Location:[facility]     VASECTOMY Bilateral 05     No current outpatient prescriptions on file.     OTC products: None, except as noted above    Allergies   Allergen Reactions     No Known Drug Allergies       Latex Allergy: NO    Social History   Substance Use Topics     Smoking status: Never Smoker     Smokeless tobacco: Former User     Types: Chew     Alcohol use Yes      Comment: Occ     History   Drug Use No       REVIEW OF SYSTEMS:    CONSTITUTIONAL: NEGATIVE for fever, chills, change in weight  ENT/MOUTH: NEGATIVE for ear, mouth and throat problems  RESP: NEGATIVE for significant cough or SOB  CV: NEGATIVE for chest pain, palpitations or peripheral edema    EXAM:   /74 (BP Location: Right arm, Patient Position: Chair, Cuff Size: Adult Regular)  Pulse 74  Temp 97.4  F (36.3  C) (Temporal)  Wt 246 lb 12.8 oz (111.9 kg)  SpO2 98%  BMI 31.69 kg/m2  GENERAL APPEARANCE: healthy, alert and no distress  HENT: ear canals and TM's normal and nose and mouth without ulcers or lesions  RESP: lungs clear to auscultation - no rales, rhonchi or wheezes  CV: regular rate and rhythm, normal S1 S2, no S3 or S4 and no murmur, click or rub   ABDOMEN: soft, nontender, no HSM or masses and bowel sounds normal  NEURO: Normal strength and tone, sensory exam grossly normal, mentation intact and speech normal    DIAGNOSTICS:   EKG: Not indicated due to non-vascular surgery and low risk of event (age <65 and without cardiac risk factors)    Recent Labs   Lab Test  12/04/14   0903  11/27/14   1111  11/25/14   1355   HGB  13.9  13.0*  14.0   PLT  296  234  252   NA   --    --   140   POTASSIUM   --    --   3.9   CR   --    --   0.84        IMPRESSION:       The proposed surgical procedure is considered LOW risk.    REVISED CARDIAC RISK INDEX  The patient has the following serious cardiovascular risks for perioperative complications such as (MI, PE, VFib and 3  AV Block):  No serious cardiac risks  INTERPRETATION: 0 risks: Class I (very low risk - 0.4% complication rate)    The patient has the following additional risks for perioperative complications:  No identified additional risks      ICD-10-CM    1. Preop general physical exam Z01.818 Lipid panel reflex to direct LDL Fasting     Basic metabolic panel       RECOMMENDATIONS:             APPROVAL GIVEN to proceed with proposed procedure, without further diagnostic evaluation       Signed Electronically by: Darek  LOLIS Marks MD    Copy of this evaluation report is provided to requesting physician.    [facility] Preop Guidelines    Revised Cardiac Risk Index

## 2018-07-25 ENCOUNTER — ANESTHESIA EVENT (OUTPATIENT)
Dept: SURGERY | Facility: CLINIC | Age: 48
End: 2018-07-25
Payer: COMMERCIAL

## 2018-07-25 ENCOUNTER — ANESTHESIA (OUTPATIENT)
Dept: SURGERY | Facility: CLINIC | Age: 48
End: 2018-07-25
Payer: COMMERCIAL

## 2018-07-25 ENCOUNTER — HOSPITAL ENCOUNTER (OUTPATIENT)
Facility: CLINIC | Age: 48
Discharge: HOME | End: 2018-07-25
Attending: ORTHOPAEDIC SURGERY | Admitting: ORTHOPAEDIC SURGERY
Payer: COMMERCIAL

## 2018-07-25 ENCOUNTER — SURGERY (OUTPATIENT)
Age: 48
End: 2018-07-25

## 2018-07-25 VITALS
OXYGEN SATURATION: 94 % | TEMPERATURE: 98 F | HEART RATE: 74 BPM | DIASTOLIC BLOOD PRESSURE: 82 MMHG | RESPIRATION RATE: 16 BRPM | SYSTOLIC BLOOD PRESSURE: 127 MMHG

## 2018-07-25 DIAGNOSIS — G89.18 POST-OP PAIN: Primary | ICD-10-CM

## 2018-07-25 PROCEDURE — 27210794 ZZH OR GENERAL SUPPLY STERILE: Performed by: ORTHOPAEDIC SURGERY

## 2018-07-25 PROCEDURE — 25000128 H RX IP 250 OP 636: Performed by: NURSE ANESTHETIST, CERTIFIED REGISTERED

## 2018-07-25 PROCEDURE — 36000056 ZZH SURGERY LEVEL 3 1ST 30 MIN: Performed by: ORTHOPAEDIC SURGERY

## 2018-07-25 PROCEDURE — 36000058 ZZH SURGERY LEVEL 3 EA 15 ADDTL MIN: Performed by: ORTHOPAEDIC SURGERY

## 2018-07-25 PROCEDURE — 25000566 ZZH SEVOFLURANE, EA 15 MIN: Performed by: ORTHOPAEDIC SURGERY

## 2018-07-25 PROCEDURE — 37000009 ZZH ANESTHESIA TECHNICAL FEE, EACH ADDTL 15 MIN: Performed by: ORTHOPAEDIC SURGERY

## 2018-07-25 PROCEDURE — 25000132 ZZH RX MED GY IP 250 OP 250 PS 637: Performed by: ORTHOPAEDIC SURGERY

## 2018-07-25 PROCEDURE — 71000027 ZZH RECOVERY PHASE 2 EACH 15 MINS: Performed by: ORTHOPAEDIC SURGERY

## 2018-07-25 PROCEDURE — 71000014 ZZH RECOVERY PHASE 1 LEVEL 2 FIRST HR: Performed by: ORTHOPAEDIC SURGERY

## 2018-07-25 PROCEDURE — 29881 ARTHRS KNE SRG MNISECTMY M/L: CPT | Mod: RT | Performed by: ORTHOPAEDIC SURGERY

## 2018-07-25 PROCEDURE — 25000125 ZZHC RX 250: Performed by: NURSE ANESTHETIST, CERTIFIED REGISTERED

## 2018-07-25 PROCEDURE — 25000125 ZZHC RX 250: Performed by: ORTHOPAEDIC SURGERY

## 2018-07-25 PROCEDURE — 37000008 ZZH ANESTHESIA TECHNICAL FEE, 1ST 30 MIN: Performed by: ORTHOPAEDIC SURGERY

## 2018-07-25 PROCEDURE — 40000306 ZZH STATISTIC PRE PROC ASSESS II: Performed by: ORTHOPAEDIC SURGERY

## 2018-07-25 RX ORDER — SODIUM CHLORIDE, SODIUM LACTATE, POTASSIUM CHLORIDE, CALCIUM CHLORIDE 600; 310; 30; 20 MG/100ML; MG/100ML; MG/100ML; MG/100ML
INJECTION, SOLUTION INTRAVENOUS CONTINUOUS
Status: DISCONTINUED | OUTPATIENT
Start: 2018-07-25 | End: 2018-07-25 | Stop reason: HOSPADM

## 2018-07-25 RX ORDER — OXYCODONE HYDROCHLORIDE 5 MG/1
5-10 TABLET ORAL
Qty: 20 TABLET | Refills: 0 | Status: SHIPPED | OUTPATIENT
Start: 2018-07-25 | End: 2018-08-07

## 2018-07-25 RX ORDER — ONDANSETRON 2 MG/ML
INJECTION INTRAMUSCULAR; INTRAVENOUS PRN
Status: DISCONTINUED | OUTPATIENT
Start: 2018-07-25 | End: 2018-07-25

## 2018-07-25 RX ORDER — NALOXONE HYDROCHLORIDE 0.4 MG/ML
.1-.4 INJECTION, SOLUTION INTRAMUSCULAR; INTRAVENOUS; SUBCUTANEOUS
Status: DISCONTINUED | OUTPATIENT
Start: 2018-07-25 | End: 2018-07-25 | Stop reason: HOSPADM

## 2018-07-25 RX ORDER — AMOXICILLIN 250 MG
1-2 CAPSULE ORAL 2 TIMES DAILY
Qty: 30 TABLET | Refills: 0 | Status: SHIPPED | OUTPATIENT
Start: 2018-07-25 | End: 2018-08-07

## 2018-07-25 RX ORDER — SCOLOPAMINE TRANSDERMAL SYSTEM 1 MG/1
PATCH, EXTENDED RELEASE TRANSDERMAL PRN
Status: DISCONTINUED | OUTPATIENT
Start: 2018-07-25 | End: 2018-07-25

## 2018-07-25 RX ORDER — ONDANSETRON 2 MG/ML
4 INJECTION INTRAMUSCULAR; INTRAVENOUS EVERY 30 MIN PRN
Status: DISCONTINUED | OUTPATIENT
Start: 2018-07-25 | End: 2018-07-25 | Stop reason: HOSPADM

## 2018-07-25 RX ORDER — FENTANYL CITRATE 50 UG/ML
INJECTION, SOLUTION INTRAMUSCULAR; INTRAVENOUS PRN
Status: DISCONTINUED | OUTPATIENT
Start: 2018-07-25 | End: 2018-07-25

## 2018-07-25 RX ORDER — DIMENHYDRINATE 50 MG/ML
25 INJECTION, SOLUTION INTRAMUSCULAR; INTRAVENOUS
Status: DISCONTINUED | OUTPATIENT
Start: 2018-07-25 | End: 2018-07-25 | Stop reason: HOSPADM

## 2018-07-25 RX ORDER — LIDOCAINE HYDROCHLORIDE 20 MG/ML
INJECTION, SOLUTION INFILTRATION; PERINEURAL PRN
Status: DISCONTINUED | OUTPATIENT
Start: 2018-07-25 | End: 2018-07-25

## 2018-07-25 RX ORDER — MEPERIDINE HYDROCHLORIDE 25 MG/ML
12.5 INJECTION INTRAMUSCULAR; INTRAVENOUS; SUBCUTANEOUS
Status: DISCONTINUED | OUTPATIENT
Start: 2018-07-25 | End: 2018-07-25 | Stop reason: HOSPADM

## 2018-07-25 RX ORDER — HYDROMORPHONE HYDROCHLORIDE 1 MG/ML
.3-.5 INJECTION, SOLUTION INTRAMUSCULAR; INTRAVENOUS; SUBCUTANEOUS EVERY 10 MIN PRN
Status: DISCONTINUED | OUTPATIENT
Start: 2018-07-25 | End: 2018-07-25 | Stop reason: HOSPADM

## 2018-07-25 RX ORDER — FENTANYL CITRATE 50 UG/ML
25-50 INJECTION, SOLUTION INTRAMUSCULAR; INTRAVENOUS
Status: DISCONTINUED | OUTPATIENT
Start: 2018-07-25 | End: 2018-07-25 | Stop reason: HOSPADM

## 2018-07-25 RX ORDER — BUPIVACAINE HYDROCHLORIDE AND EPINEPHRINE 5; 5 MG/ML; UG/ML
INJECTION, SOLUTION PERINEURAL PRN
Status: DISCONTINUED | OUTPATIENT
Start: 2018-07-25 | End: 2018-07-25 | Stop reason: HOSPADM

## 2018-07-25 RX ORDER — CEFAZOLIN SODIUM 1 G/3ML
1 INJECTION, POWDER, FOR SOLUTION INTRAMUSCULAR; INTRAVENOUS SEE ADMIN INSTRUCTIONS
Status: DISCONTINUED | OUTPATIENT
Start: 2018-07-25 | End: 2018-07-25 | Stop reason: HOSPADM

## 2018-07-25 RX ORDER — OXYCODONE HYDROCHLORIDE 5 MG/1
5-10 TABLET ORAL
Status: DISCONTINUED | OUTPATIENT
Start: 2018-07-25 | End: 2018-07-25 | Stop reason: HOSPADM

## 2018-07-25 RX ORDER — PROPOFOL 10 MG/ML
INJECTION, EMULSION INTRAVENOUS PRN
Status: DISCONTINUED | OUTPATIENT
Start: 2018-07-25 | End: 2018-07-25

## 2018-07-25 RX ORDER — ONDANSETRON 4 MG/1
4 TABLET, ORALLY DISINTEGRATING ORAL EVERY 30 MIN PRN
Status: DISCONTINUED | OUTPATIENT
Start: 2018-07-25 | End: 2018-07-25 | Stop reason: HOSPADM

## 2018-07-25 RX ORDER — CEFAZOLIN SODIUM 2 G/100ML
2 INJECTION, SOLUTION INTRAVENOUS
Status: DISCONTINUED | OUTPATIENT
Start: 2018-07-25 | End: 2018-07-25 | Stop reason: HOSPADM

## 2018-07-25 RX ORDER — DEXAMETHASONE SODIUM PHOSPHATE 10 MG/ML
INJECTION, SOLUTION INTRAMUSCULAR; INTRAVENOUS PRN
Status: DISCONTINUED | OUTPATIENT
Start: 2018-07-25 | End: 2018-07-25

## 2018-07-25 RX ORDER — LIDOCAINE 40 MG/G
CREAM TOPICAL
Status: DISCONTINUED | OUTPATIENT
Start: 2018-07-25 | End: 2018-07-25 | Stop reason: HOSPADM

## 2018-07-25 RX ADMIN — SODIUM CHLORIDE, POTASSIUM CHLORIDE, SODIUM LACTATE AND CALCIUM CHLORIDE: 600; 310; 30; 20 INJECTION, SOLUTION INTRAVENOUS at 11:45

## 2018-07-25 RX ADMIN — Medication 30 ML: at 12:44

## 2018-07-25 RX ADMIN — LIDOCAINE HYDROCHLORIDE 100 MG: 20 INJECTION, SOLUTION INFILTRATION; PERINEURAL at 11:57

## 2018-07-25 RX ADMIN — DEXAMETHASONE SODIUM PHOSPHATE 10 MG: 10 INJECTION, SOLUTION INTRAMUSCULAR; INTRAVENOUS at 12:13

## 2018-07-25 RX ADMIN — OXYCODONE HYDROCHLORIDE 5 MG: 5 TABLET ORAL at 14:12

## 2018-07-25 RX ADMIN — FENTANYL CITRATE 50 MCG: 50 INJECTION, SOLUTION INTRAMUSCULAR; INTRAVENOUS at 11:57

## 2018-07-25 RX ADMIN — SCOLOPAMINE TRANSDERMAL SYSTEM 1 PATCH: 1 PATCH, EXTENDED RELEASE TRANSDERMAL at 11:53

## 2018-07-25 RX ADMIN — FENTANYL CITRATE 50 MCG: 50 INJECTION, SOLUTION INTRAMUSCULAR; INTRAVENOUS at 12:12

## 2018-07-25 RX ADMIN — MIDAZOLAM 2 MG: 1 INJECTION INTRAMUSCULAR; INTRAVENOUS at 11:48

## 2018-07-25 RX ADMIN — PROPOFOL 200 MG: 10 INJECTION, EMULSION INTRAVENOUS at 11:57

## 2018-07-25 RX ADMIN — ONDANSETRON 4 MG: 2 INJECTION INTRAMUSCULAR; INTRAVENOUS at 12:13

## 2018-07-25 RX ADMIN — HYDROMORPHONE HYDROCHLORIDE 0.5 MG: 1 INJECTION, SOLUTION INTRAMUSCULAR; INTRAVENOUS; SUBCUTANEOUS at 12:19

## 2018-07-25 ASSESSMENT — PAIN DESCRIPTION - DESCRIPTORS
DESCRIPTORS: PRESSURE
DESCRIPTORS: ACHING

## 2018-07-25 NOTE — ANESTHESIA PREPROCEDURE EVALUATION
Anesthesia Evaluation     . Pt has had prior anesthetic. Type: General           ROS/MED HX    ENT/Pulmonary:  - neg pulmonary ROS     Neurologic:  - neg neurologic ROS     Cardiovascular:     (+) Dyslipidemia, ----. : . . . :. .       METS/Exercise Tolerance:  >4 METS   Hematologic:  - neg hematologic  ROS       Musculoskeletal:   (+) , , other musculoskeletal- Right knee debridement      GI/Hepatic:  - neg GI/hepatic ROS       Renal/Genitourinary:  - ROS Renal section negative       Endo:  - neg endo ROS       Psychiatric:  - neg psychiatric ROS       Infectious Disease:  - neg infectious disease ROS       Malignancy:      - no malignancy   Other:    - neg other ROS                 Physical Exam  Normal systems: cardiovascular, pulmonary and dental    Airway   Mallampati: I  TM distance: >3 FB  Neck ROM: full    Dental     Cardiovascular   Rhythm and rate: regular and normal      Pulmonary    breath sounds clear to auscultation                    Anesthesia Plan      History & Physical Review  History and physical reviewed and following examination; no interval change.    ASA Status:  1 .    NPO Status:  > 8 hours    Plan for General and LMA with Intravenous and Propofol induction. Maintenance will be Inhalation.    PONV prophylaxis:  Ondansetron (or other 5HT-3) and Dexamethasone or Solumedrol       Postoperative Care  Postoperative pain management:  IV analgesics.      Consents  Anesthetic plan, risks, benefits and alternatives discussed with:  Patient..                          .

## 2018-07-25 NOTE — OP NOTE
Procedure Date: 07/25/2018      PREOPERATIVE DIAGNOSIS:  Medial meniscus tear, right knee.      POSTOPERATIVE DIAGNOSIS:  Macerated complex tearing of the posterior bucket handle tear medial meniscus, right knee.      PROCEDURE:  Arthroscopic debridement of medial meniscus.      SURGEON:  Seth Basilio MD      ANESTHESIA:  General with local infiltration 0.5% Marcaine at closure.      INDICATIONS:  Mr. Keyes is a 47-year-old gentleman who has had persistent medial knee pain and eventually underwent an MRI that showed that he had a torn medial meniscus.  A flap was extended into the notch.  Because he had persistence of pain and the pathology noted, arthroscopic debridement was offered.  The rationale, risks, benefits and limitations were reviewed in the office as well as preoperatively.  His wife was present in the preoperative hold area.  The extremity was marked and consent was obtained.      DETAILS OF PROCEDURE:  The patient was brought to the operating room, placed supine on  the operating table and general anesthesia was induced.  Right upper thigh tourniquet was applied.  He was positioned on the table appropriately.  Right leg was placed in a surgical leg elam.  Left leg was placed over a pad and the foot of the table was dropped.  Right leg was prepped and draped in standard fashion for arthroscopic surgery.        Timeout protocol was followed.      The knee was filled with normal saline from the lateral approach.  Leg was then exsanguinated with an Esmarch and the tourniquet was inflated to 300 mmHg.  Superior lateral outflow was established, followed by an anterolateral arthroscopic portal.  We rapidly established an anterior medial portal under direct visualization.      The knee was examined in systematic fashion including assessment of suprapatellar pouch, medial and lateral gutters, medial and lateral compartments and patellofemoral joint.  There was an incidental plica fold medially which was  debrided.      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      The medial compartment showed evidence of a complex tear of the posterior horn.  This most likely represented a fairly large bucket handle that eventually detached more medially and then flipped into the notch.  This left a fairly significant medial flap which had great potential to be impinged as well as the fragment that had flipped into the notch.        We now spent time simply debriding these free ends.  The fragment in the notch required a combination of basket forceps and motorized shaver to smooth it to a stable scenario.  The medial portion of the meniscus was simply debrided with a motorized shaver.  There was a remnant rim but relatively thin posteriorly.  Articular surface medially showed a little bit more chondromalacia, both on the femur and the tibia as opposed to the other compartments.  The patella did seem to be tracking slightly towards lateral, but no evidence of additional chondromalacia was appreciated.      With the above accomplished, instruments were removed.  Fluid was extruded.  Wounds were then infiltrated with 0.5% Marcaine with epinephrine.      They were closed with figure-of-eight 3-0 nylon.  Dressings of Xeroform, 4 x 4, ABD and sterile Webril were applied followed by [id number] bulky dressing.        The patient was repositioned, awakened, and transferred to the [facility].  He was then transferred to the recovery area, where stable vital signs were noted.         JUAN CONTE MD             D: 2018   T: 2018   MT: KARLA      Name:     OBIE TAVARES   MRN:      [id number]        Account:        [id number]   :      1970           Procedure Date: 2018      Document: [id number]

## 2018-07-25 NOTE — ANESTHESIA POSTPROCEDURE EVALUATION
Patient: Carlos Keyes    Procedure(s):  Right knee arthroscopy with medial meniscus debridement - Wound Class: I-Clean    Diagnosis:Complex tear of medial meniscus of right knee as current injury,  Diagnosis Additional Information: No value filed.    Anesthesia Type:  General, LMA    Note:  Anesthesia Post Evaluation    Patient location during evaluation: Phase 2  Patient participation: Able to fully participate in evaluation  Level of consciousness: awake and alert  Pain management: adequate  Airway patency: patent  Cardiovascular status: acceptable  Respiratory status: acceptable  Hydration status: acceptable  PONV: none     Anesthetic complications: None          Last vitals:  Vitals:    07/25/18 1330 07/25/18 1345 07/25/18 1400   BP: 127/83 127/85 127/82   Pulse: 70 66 74   Resp:      Temp:      SpO2: 93% 95% 94%         Electronically Signed By: CIRILO Crawford CRNA  July 25, 2018  3:42 PM

## 2018-07-25 NOTE — ANESTHESIA CARE TRANSFER NOTE
Patient: Carlos Keyes    Procedure(s):  Right knee arthroscopy with medial meniscus debridement - Wound Class: I-Clean    Diagnosis: Complex tear of medial meniscus of right knee as current injury,  Diagnosis Additional Information: No value filed.    Anesthesia Type:   General, LMA     Note:  Airway :Nasal Cannula  Patient transferred to:PACU  Handoff Report: Identifed the Patient, Identified the Reponsible Provider, Reviewed the pertinent medical history, Discussed the surgical course, Reviewed Intra-OP anesthesia mangement and issues during anesthesia, Set expectations for post-procedure period and Allowed opportunity for questions and acknowledgement of understanding      Vitals: (Last set prior to Anesthesia Care Transfer)    CRNA VITALS  7/25/2018 1216 - 7/25/2018 1252      7/25/2018             SpO2: 98 %                Electronically Signed By: CIRILO Crawford CRNA  July 25, 2018  12:52 PM

## 2018-07-25 NOTE — DISCHARGE INSTRUCTIONS
DISCHARGE INSTRUCTIONS FOR PATIENT   SCOPOLAMINE TRANSDERMAL PATCH  You may leave the patch on behind your ear for up to three days, but NO LONGER. You may have withdrawal symptoms (nausea, vomiting, headache, dizziness) if used longer.  When you remove the patch, be sure to wash and dry your hands thoroughly and before touching your eyes, as pupil may dilate.  Discard patch (away from children and pets).  You may develop some urinary hesitancy or urine retention.    Discharge Instructions for Knee Arthroscopy  You had knee arthroscopy. This surgical procedure uses small incisions to locate, identify, and treat problems inside the knee. These problems include loose bodies, meniscal tears, bone spurs, osteochondritis dissecans (OCD), and synovitis. Below are tips to help speed your recovery from surgery.  Activity    Don t drive until your doctor says it s OK. And never drive while taking opioid pain medicine.    Remember to take pain medicines as directed; don t wait for the pain to get bad. And don't drink alcohol while taking pain medicines.    weight-bearing as tolerated.    Unless your doctor tells you otherwise, begin using the affected knee as much as you can tolerate.    Slowly bend and straighten your affected leg as far as you can, unless your doctor tells you otherwise. Do this several times a day.    Rest your knee by lying down and putting pillows under it for the first 3 days after surgery. Keep your ankle elevated above the level of your heart. This helps keep swelling down.    Follow your doctor s instructions about wearing and caring for a brace, immobilizer, or elastic dressing.    Point and flex your foot, and rotate your ankle as much as possible during the first few weeks following surgery. Also, wiggle your toes as much as possible.  Incision care    Check your incision daily for redness, tenderness, or drainage.    Don t be alarmed if there is some bruising, slight swelling of the knee, or a  small amount of blood on the bandage.    Adjust the bandage or brace as needed. It should feel supportive on your knee, but not too tight.     Remove the bandage in 3 days (Saturday 7/28/18)    Don t soak your incision in water (no hot tubs, bathtubs, swimming pools) until your doctor says it s OK.    Wait 3 day(s) after your surgery to begin showering. Then shower as needed.     Use an ice pack or bag of frozen peas--or something similar--wrapped in a thin towel to reduce the swelling. Keep the foot elevated while you ice the knee. Apply the ice pack for 20 minutes; then remove it for 20 minutes. Repeat as needed. Icing helps reduce swelling.        Date Last Reviewed: 11/16/2015 2000-2017 [employer]. [address] [place] [address]. All rights reserved. This information is not intended as a substitute for professional medical care. Always follow your healthcare professional's instructions.         Same-Day Surgery   Adult Discharge Orders & Instructions     For 24 hours after surgery    1. Get plenty of rest.  A responsible adult must stay with you for at least 24 hours after you leave the hospital.   2. Do not drive or use heavy equipment.  If you have weakness or tingling, don't drive or use heavy equipment until this feeling goes away.  3. Do not drink alcohol.  4. Avoid strenuous or risky activities.  Ask for help when climbing stairs.   5. You may feel lightheaded.  If so, sit for a few minutes before standing.  Have someone help you get up.   6. You may have a slight fever. Call the doctor if your fever is over 100 F (37.7 C) (taken under the tongue) or lasts longer than 24 hours.  7. You may have a dry mouth, a sore throat, muscle aches or trouble sleeping.  These should go away after 24 hours.  8. Do not make important or legal decisions.  Based on the surgery/procedure that you had today, we do not expect that you will have any problems.  However, we want you to know what to do  if you have pain, nausea, bleeding,or infection:  To control pain:  Take medicines your physician has prescribed or or over-the counter medicine he or she advises.  Ice packs and periods of rest are often helpful.  For surgery on an arm or leg, raise it on a pillow to ease swelling.  If your pain is not managed with the above methods, contact your physician.  To control nausea:  Take anti-nausea medicine approved by your physician.  Drink clear liquids such as apple juice, ginger ale, broth or 7-Up. Be sure to drink enough fluids.  Move to a regular diet as you feel able.  Rest may also help.  Bleeding:  You may see a little blood on your dressing, about the size of a quarter in the first 24 hours.  If you see this, there is no reason to be alarmed.  However, if this continues to increase in size, apply pressure if able, and notify your physician.  Infection: Please contact your physician if you have any of the following signs:  redness, swelling, heat, increasing pain or foul-smelling drainage at your surgery site, fever or chills,     Call your doctor for any of the followin.  It has been over 8 to 10 hours since surgery and you are still not able to urinate (pass water).    2.  Headache for over 24 hours.    3.  Numbness, tingling or weakness in your legs the day after surgery (if you had spinal anesthesia).    Nurse advice line: [phone]

## 2018-07-25 NOTE — IP AVS SNAPSHOT
[facility]    [address] [place] [address]    Phone:  [phone]                                       After Visit Summary   7/25/2018    Carlos Keyes    MRN: [id number]           After Visit Summary Signature Page     I have received my discharge instructions, and my questions have been answered. I have discussed any challenges I see with this plan with the nurse or doctor.    ..........................................................................................................................................  Patient/Patient Representative Signature      ..........................................................................................................................................  Patient Representative Print Name and Relationship to Patient    ..................................................               ................................................  Date                                            Time    ..........................................................................................................................................  Reviewed by Signature/Title    ...................................................              ..............................................  Date                                                            Time

## 2018-07-25 NOTE — BRIEF OP NOTE
[facility] Orthopedic Brief Operative Note    Pre-operative diagnosis: Complex tear of medial meniscus of right knee as current injury,   Post-operative diagnosis: Same   Procedure: Procedure(s):  ARTHROSCOPY KNEE WITH DEBRIDEMENT JOINT   Surgeon: Seth Basilio MD   Assistant(s): None   Anesthesia: General endotracheal anesthesia and Local anesthesia   Estimated blood loss: Less than 10 ml   Total IV fluids: (See anesthesia record)   Drains: None   Specimens: None   Implants: See op note   Findings: Macerated bucket handle   Complications: None   Weight bearing status: Weight bearing as tolerated   Comments: See dictated operative report for full details

## 2018-07-25 NOTE — IP AVS SNAPSHOT
MRN:[id number]                      After Visit Summary   7/25/2018    Carlos Keyes    MRN: [id number]           Thank you!     Thank you for choosing [facility] for your care. Our goal is always to provide you with excellent care. Hearing back from our patients is one way we can continue to improve our services. Please take a few minutes to complete the written survey that you may receive in the mail after you visit with us. Thank you!        Patient Information     Date Of Birth          1970        About your hospital stay     You were admitted on:  July 25, 2018 You last received care in the:  [facility]    You were discharged on:  July 25, 2018       Who to Call     For medical emergencies, please call 911.  For non-urgent questions about your medical care, please call your primary care provider or clinic, [phone]  For questions related to your surgery, please call your surgery clinic        Attending Provider     Provider Specialty    Seth Basilio MD Orthopedics       Primary Care Provider Office Phone # Fax #    Brianabel Wyatt Marks [phone]      After Care Instructions     Discharge Instructions       Review outpatient procedure discharge instructions with patient as directed by Provider            Dressing Change        IF leaking, remove and redress. Wash hands before and after and use gloves.            Return to clinic       Return to clinic in 10 days.            Weight bearing - As tolerated                 Your next 10 appointments already scheduled     Aug 07, 2018  7:30 AM CDT   [facility]    [address] [place] [address] [phone]              Further instructions from your care team       DISCHARGE INSTRUCTIONS FOR PATIENT   SCOPOLAMINE TRANSDERMAL PATCH  You may leave the patch on behind your ear for up to  three days, but NO LONGER. You may have withdrawal symptoms (nausea, vomiting, headache, dizziness) if used longer.  When you remove the patch, be sure to wash and dry your hands thoroughly and before touching your eyes, as pupil may dilate.  Discard patch (away from children and pets).  You may develop some urinary hesitancy or urine retention.    Discharge Instructions for Knee Arthroscopy  You had knee arthroscopy. This surgical procedure uses small incisions to locate, identify, and treat problems inside the knee. These problems include loose bodies, meniscal tears, bone spurs, osteochondritis dissecans (OCD), and synovitis. Below are tips to help speed your recovery from surgery.  Activity    Don t drive until your doctor says it s OK. And never drive while taking opioid pain medicine.    Remember to take pain medicines as directed; don t wait for the pain to get bad. And don't drink alcohol while taking pain medicines.    weight-bearing as tolerated.    Unless your doctor tells you otherwise, begin using the affected knee as much as you can tolerate.    Slowly bend and straighten your affected leg as far as you can, unless your doctor tells you otherwise. Do this several times a day.    Rest your knee by lying down and putting pillows under it for the first 3 days after surgery. Keep your ankle elevated above the level of your heart. This helps keep swelling down.    Follow your doctor s instructions about wearing and caring for a brace, immobilizer, or elastic dressing.    Point and flex your foot, and rotate your ankle as much as possible during the first few weeks following surgery. Also, wiggle your toes as much as possible.  Incision care    Check your incision daily for redness, tenderness, or drainage.    Don t be alarmed if there is some bruising, slight swelling of the knee, or a small amount of blood on the bandage.    Adjust the bandage or brace as needed. It should feel supportive on your knee, but  not too tight.     Remove the bandage in 3 days (Saturday 7/28/18)    Don t soak your incision in water (no hot tubs, bathtubs, swimming pools) until your doctor says it s OK.    Wait 3 day(s) after your surgery to begin showering. Then shower as needed.     Use an ice pack or bag of frozen peas--or something similar--wrapped in a thin towel to reduce the swelling. Keep the foot elevated while you ice the knee. Apply the ice pack for 20 minutes; then remove it for 20 minutes. Repeat as needed. Icing helps reduce swelling.        Date Last Reviewed: 11/16/2015 2000-2017 [employer]. [address] [place] [address], [place] [address]. All rights reserved. This information is not intended as a substitute for professional medical care. Always follow your healthcare professional's instructions.         Same-Day Surgery   Adult Discharge Orders & Instructions     For 24 hours after surgery    1. Get plenty of rest.  A responsible adult must stay with you for at least 24 hours after you leave the hospital.   2. Do not drive or use heavy equipment.  If you have weakness or tingling, don't drive or use heavy equipment until this feeling goes away.  3. Do not drink alcohol.  4. Avoid strenuous or risky activities.  Ask for help when climbing stairs.   5. You may feel lightheaded.  If so, sit for a few minutes before standing.  Have someone help you get up.   6. You may have a slight fever. Call the doctor if your fever is over 100 F (37.7 C) (taken under the tongue) or lasts longer than 24 hours.  7. You may have a dry mouth, a sore throat, muscle aches or trouble sleeping.  These should go away after 24 hours.  8. Do not make important or legal decisions.  Based on the surgery/procedure that you had today, we do not expect that you will have any problems.  However, we want you to know what to do if you have pain, nausea, bleeding,or infection:  To control pain:  Take medicines your physician has prescribed or or  over-the counter medicine he or she advises.  Ice packs and periods of rest are often helpful.  For surgery on an arm or leg, raise it on a pillow to ease swelling.  If your pain is not managed with the above methods, contact your physician.  To control nausea:  Take anti-nausea medicine approved by your physician.  Drink clear liquids such as apple juice, ginger ale, broth or 7-Up. Be sure to drink enough fluids.  Move to a regular diet as you feel able.  Rest may also help.  Bleeding:  You may see a little blood on your dressing, about the size of a quarter in the first 24 hours.  If you see this, there is no reason to be alarmed.  However, if this continues to increase in size, apply pressure if able, and notify your physician.  Infection: Please contact your physician if you have any of the following signs:  redness, swelling, heat, increasing pain or foul-smelling drainage at your surgery site, fever or chills,     Call your doctor for any of the followin.  It has been over 8 to 10 hours since surgery and you are still not able to urinate (pass water).    2.  Headache for over 24 hours.    3.  Numbness, tingling or weakness in your legs the day after surgery (if you had spinal anesthesia).    Nurse advice line: [phone]      Pending Results     No orders found from 2018 to 2018.            Admission Information     Date & Time Provider Department Dept. Phone    2018 Seth Basilio MD [facility] [phone]      Your Vitals Were     Blood Pressure Pulse Temperature Respirations Pulse Oximetry       138/91 72 98  F (36.7  C) (Oral) 16 95%       MyChart Information     [employer] gives you secure access to your electronic health record. If you see a primary care provider, you can also send messages to your care team and make appointments. If you have questions, please call your primary care clinic.  If you do not have a primary care provider, please call  [phone] and they will assist you.        Care EveryWhere ID     This is your Care EveryWhere ID. This could be used by other organizations to access your [facility] medical records  [id number]        Equal Access to Services     ELVIS SHEPARD: Hadii aad ku hadzeynepjulio Shaw, washerronda luqadaha, qaybta kaalmada abdiel, marty natain hayaajosefa miramontes segundoalyssa shepard. So [facility] [phone].    ATENCIÓN: Si habla español, tiene a brennan disposición servicios gratuitos de asistencia lingüística. Llame al [phone].    We comply with applicable federal civil rights laws and [place] laws. We do not discriminate on the basis of race, color, national origin, age, disability, sex, sexual orientation, or gender identity.               Review of your medicines      START taking        Dose / Directions    oxyCODONE IR 5 MG tablet   Commonly known as:  ROXICODONE   Used for:  Post-op pain        Dose:  5-10 mg   Take 1-2 tablets (5-10 mg) by mouth every 3 hours as needed for pain or other (Moderate to Severe)   Quantity:  20 tablet   Refills:  0       senna-docusate 8.6-50 MG per tablet   Commonly known as:  SENOKOT-S;PERICOLACE   Used for:  Post-op pain        Dose:  1-2 tablet   Take 1-2 tablets by mouth 2 times daily Take while on oral narcotics to prevent or treat constipation.   Quantity:  30 tablet   Refills:  0         CONTINUE these medicines which have NOT CHANGED        Dose / Directions    simvastatin 20 MG tablet   Commonly known as:  ZOCOR   Used for:  Hyperlipidemia LDL goal <130        Dose:  20 mg   Take 1 tablet (20 mg) by mouth At Bedtime   Quantity:  90 tablet   Refills:  3            Where to get your medicines      Some of these will need a paper prescription and others can be bought over the counter. Ask your nurse if you have questions.     Bring a paper prescription for each of these medications     oxyCODONE IR 5 MG tablet    senna-docusate 8.6-50 MG per tablet                Protect others around you:  Learn how to safely use, store and throw away your medicines at www.disposemymeds.org.        Information about OPIOIDS     PRESCRIPTION OPIOIDS: WHAT YOU NEED TO KNOW   We gave you an opioid (narcotic) pain medicine. It is important to manage your pain, but opioids are not always the best choice. You should first try all the other options your care team gave you. Take this medicine for as short a time (and as few doses) as possible.     These medicines have risks:    DO NOT drive when on new or higher doses of pain medicine. These medicines can affect your alertness and reaction times, and you could be arrested for driving under the influence (DUI). If you need to use opioids long-term, talk to your care team about driving.    DO NOT operate heave machinery    DO NOT do any other dangerous activities while taking these medicines.     DO NOT drink any alcohol while taking these medicines.      If the opioid prescribed includes acetaminophen, DO NOT take with any other medicines that contain acetaminophen. Read all labels carefully. Look for the word  acetaminophen  or  Tylenol.  Ask your pharmacist if you have questions or are unsure.    You can get addicted to pain medicines, especially if you have a history of addiction (chemical, alcohol or substance dependence). Talk to your care team about ways to reduce this risk.    Store your pills in a secure place, locked if possible. We will not replace any lost or stolen medicine. If you don t finish your medicine, please throw away (dispose) as directed by your pharmacist. The [place] Pollution [employer] Agency has more information about safe disposal: https://www.pca.[facility].[place].us/living-green/managing-unwanted-medications.     All opioids tend to cause constipation. Drink plenty of water and eat foods that have a lot of fiber, such as fruits, vegetables, prune juice, apple juice and high-fiber cereal. Take a laxative (Miralax, milk of magnesia, Colace, Senna) if you  don t move your bowels at least every other day.              Medication List: This is a list of all your medications and when to take them. Check marks below indicate your daily home schedule. Keep this list as a reference.      Medications           Morning Afternoon Evening Bedtime As Needed    oxyCODONE IR 5 MG tablet   Commonly known as:  ROXICODONE   Take 1-2 tablets (5-10 mg) by mouth every 3 hours as needed for pain or other (Moderate to Severe)                                senna-docusate 8.6-50 MG per tablet   Commonly known as:  SENOKOT-S;PERICOLACE   Take 1-2 tablets by mouth 2 times daily Take while on oral narcotics to prevent or treat constipation.                                simvastatin 20 MG tablet   Commonly known as:  ZOCOR   Take 1 tablet (20 mg) by mouth At Bedtime

## 2018-08-07 ENCOUNTER — OFFICE VISIT (OUTPATIENT)
Dept: ORTHOPEDICS | Facility: CLINIC | Age: 48
End: 2018-08-07
Payer: COMMERCIAL

## 2018-08-07 VITALS
DIASTOLIC BLOOD PRESSURE: 82 MMHG | HEIGHT: 74 IN | SYSTOLIC BLOOD PRESSURE: 127 MMHG | HEART RATE: 80 BPM | BODY MASS INDEX: 31.95 KG/M2 | WEIGHT: 249 LBS

## 2018-08-07 DIAGNOSIS — S76.319S: ICD-10-CM

## 2018-08-07 DIAGNOSIS — Z98.890 S/P ARTHROSCOPY OF RIGHT KNEE: Primary | ICD-10-CM

## 2018-08-07 DIAGNOSIS — S83.231A COMPLEX TEAR OF MEDIAL MENISCUS OF RIGHT KNEE AS CURRENT INJURY, INITIAL ENCOUNTER: ICD-10-CM

## 2018-08-07 PROCEDURE — 99024 POSTOP FOLLOW-UP VISIT: CPT | Performed by: PHYSICIAN ASSISTANT

## 2018-08-07 ASSESSMENT — PAIN SCALES - GENERAL: PAINLEVEL: NO PAIN (0)

## 2018-08-07 NOTE — LETTER
"    8/7/2018         RE: Carlos Keyes  [address] [facility] [address]        Dear Colleague,    Thank you for referring your patient, Carlos Keyes, to the [facility]. Please see a copy of my visit note below.    HISTORY OF PRESENT ILLNESS:    Carlos Keyes is a 47 year old male who is seen in follow up for   Chief Complaint   Patient presents with     RECHECK     Right knee arthroscopy with medial meniscus debridement.  DOS: 7/25/18 (13 days postop)     Surgical Followup     PREOPERATIVE DIAGNOSIS:  Medial meniscus tear, right knee. POSTOPERATIVE DIAGNOSIS:  Macerated complex tearing of the posterior bucket handle tear medial meniscus, right knee. PROCEDURE:  Arthroscopic debridement of medial meniscus.   Interval: Patient reports the knee is feeling much better since few days after surgery.  Patient states he still struggles somewhat the hamstring injury.  Patient evaluation done with Dr. Basilio    Physical Exam:  Vitals: /82 (BP Location: Left arm, Patient Position: Chair, Cuff Size: Adult Large)  Pulse 80  Ht 1.88 m (6' 2\")  Wt 112.9 kg (249 lb)  BMI 31.97 kg/m2  BMI= Body mass index is 31.97 kg/(m^2).  Constitutional: healthy, alert and no acute distress   Psychiatric: mentation appears normal and affect normal/bright  NEURO: no focal deficits  Location of surgery: Right knee  Mild swelling present  Incision sites: Sutures removed today.  Skin around suture sites do appear irritated from the sutures.  Range of motion: Full extension and appropriate flexion    IMAGING INTERPRETATION: Intra-op pictures were reviewed with patient and a copy given to him  The images were explained to the patient.     ASSESSMENT:    Chief Complaint   Patient presents with     RECHECK     Right knee arthroscopy with medial meniscus debridement.  DOS: 7/25/18 (13 days postop)     Surgical Followup     PREOPERATIVE DIAGNOSIS:  Medial meniscus tear, right knee. POSTOPERATIVE DIAGNOSIS:  Macerated " complex tearing of the posterior bucket handle tear medial meniscus, right knee. PROCEDURE:  Arthroscopic debridement of medial meniscus.       ICD-10-CM    1. Complex tear of medial meniscus of right knee as current injury, initial encounter S83.231A PHYSICAL THERAPY REFERRAL   2. Partial hamstring tear, sequela S76.319S PHYSICAL THERAPY REFERRAL     Patient is 13 days status post right medial meniscus debridement and plica shaving.  Patient progressing as expected however he is still struggling with hamstring discomfort.    Plan:   Sutures removed this visit.  Refill pain medication: Not needed  Physical Therapy: Patient is shown how to do a one leg squat.  Patient is scheduled for physical therapy to primarily address hamstring stretching and for regaining strength postop medial meniscus debridement  Continue elevation of affected extremity.  Return to clinic 6 weeks to evaluate return of strength    BP Readings from Last 1 Encounters:   08/07/18 127/82     BP noted to be well controlled today in office.     Cece Skelton PA-C   8/7/2018  8:04 AM      I attest I have seen and evaluated the patient.  I agree with above impression and plan.    Seth Basilio MD    Again, thank you for allowing me to participate in the care of your patient.        Sincerely,        Seth Basilio MD

## 2018-08-07 NOTE — MR AVS SNAPSHOT
"              After Visit Summary   8/7/2018    Carlos Keyes    MRN: [id number]           Patient Information     Date Of Birth          1970        Visit Information        Provider Department      8/7/2018 7:30 AM Seth Basilio MD [facility]        Today's Diagnoses     S/P arthroscopy of right knee    -  1    Complex tear of medial meniscus of right knee as current injury, initial encounter        Partial hamstring tear, sequela           Follow-ups after your visit        Additional Services     PHYSICAL THERAPY REFERRAL       *This therapy referral will be filtered to a centralized scheduling office at [facility] and the patient will receive a call to schedule an appointment at a [place] location most convenient for them. *     [facility] provides Physical Therapy evaluation and treatment and many specialty services across the [place] system.  If requesting a specialty program, please choose from the list below.    If you have not heard from the scheduling office within 2 business days, please call [phone] for all locations, with the exception of [place], please call [phone] and [facility], please call [phone]  Treatment: Evaluation & Treatment  Special Instructions/Modalities: PRN  Special Programs: Post Op Knee rehab.  Hamstring stretching and strengthening.    Please be aware that coverage of these services is subject to the terms and limitations of your health insurance plan.  Call member services at your health plan with any benefit or coverage questions.      **Note to Provider:  If you are referring outside of [place] for the therapy appointment, please list the name of the location in the \"special instructions\" above, print the referral and give to the patient to schedule the appointment.                  Your next 10 appointments already scheduled     Aug 13, 2018  9:45 AM JIN Fisher with Cyndi Delarosa " "JOCELINE Feliciano   [facility] ([facility])    [address] [place] [address] [place] [id number] [phone]            Sep 18, 2018  7:30 AM CDT   [facility] with [facility]ilio [facility]   [facility]    [address] [facility]  [place] [id number] [phone]              Who to contact     If you have questions or need follow up information about today's clinic visit or your schedule please contact [facility] directly at [phone].  Normal or non-critical lab and imaging results will be communicated to you by Cigitalhart, letter or phone within 4 business days after the clinic has received the results. If you do not hear from us within 7 days, please contact the clinic through Choistert or phone. If you have a critical or abnormal lab result, we will notify you by phone as soon as possible.  Submit refill requests through MiTurno or call your pharmacy and they will forward the refill request to us. Please allow 3 business days for your refill to be completed.          Additional Information About Your Visit        MyChart Information     MiTurno gives you secure access to your electronic health record. If you see a primary care provider, you can also send messages to your care team and make appointments. If you have questions, please call your primary care clinic.  If you do not have a primary care provider, please call [phone] and they will assist you.        Care EveryWhere ID     This is your Care EveryWhere ID. This could be used by other organizations to access your [place] medical records  [id number]        Your Vitals Were     Pulse Height BMI (Body Mass Index)             80 1.88 m (6' 2\") 31.97 kg/m2          Blood Pressure from Last 3 Encounters:   08/07/18 127/82   07/25/18 127/82   07/18/18 126/74    Weight from Last 3 Encounters:   08/07/18 112.9 kg (249 lb)   07/18/18 111.9 kg (246 lb 12.8 oz) "   06/07/18 111.6 kg (246 lb)              We Performed the Following     PHYSICAL THERAPY REFERRAL        Primary Care Provider Office Phone # Fax #    Darek Marks [phone] [address] [place] [address]        Equal Access to Services     [facility]: Hadii aad ku hadasho Soomaali, waaxda luqadaha, qaybta kaalmada adeegyada, waxay idiin hayaan adeeg manuela lasamian ah. So [facility] [phone].    ATENCIÓN: Si habla español, tiene a brennan disposición servicios gratuitos de asistencia lingüística. Llame al [phone].    We comply with applicable federal civil rights laws and [place] laws. We do not discriminate on the basis of race, color, national origin, age, disability, sex, sexual orientation, or gender identity.            Thank you!     Thank you for choosing [facility]  for your care. Our goal is always to provide you with excellent care. Hearing back from our patients is one way we can continue to improve our services. Please take a few minutes to complete the written survey that you may receive in the mail after your visit with us. Thank you!             Your Updated Medication List - Protect others around you: Learn how to safely use, store and throw away your medicines at www.disposemymeds.org.          This list is accurate as of 8/7/18  2:01 PM.  Always use your most recent med list.                   Brand Name Dispense Instructions for use Diagnosis    simvastatin 20 MG tablet    ZOCOR    90 tablet    Take 1 tablet (20 mg) by mouth At Bedtime    Hyperlipidemia LDL goal <130

## 2018-08-07 NOTE — PROGRESS NOTES
"HISTORY OF PRESENT ILLNESS:    Carlos Keyes is a 47 year old male who is seen in follow up for   Chief Complaint   Patient presents with     RECHECK     Right knee arthroscopy with medial meniscus debridement.  DOS: 7/25/18 (13 days postop)     Surgical Followup     PREOPERATIVE DIAGNOSIS:  Medial meniscus tear, right knee. POSTOPERATIVE DIAGNOSIS:  Macerated complex tearing of the posterior bucket handle tear medial meniscus, right knee. PROCEDURE:  Arthroscopic debridement of medial meniscus.   Interval: Patient reports the knee is feeling much better since few days after surgery.  Patient states he still struggles somewhat the hamstring injury.  Patient evaluation done with Dr. Basilio    Physical Exam:  Vitals: /82 (BP Location: Left arm, Patient Position: Chair, Cuff Size: Adult Large)  Pulse 80  Ht 1.88 m (6' 2\")  Wt 112.9 kg (249 lb)  BMI 31.97 kg/m2  BMI= Body mass index is 31.97 kg/(m^2).  Constitutional: healthy, alert and no acute distress   Psychiatric: mentation appears normal and affect normal/bright  NEURO: no focal deficits  Location of surgery: Right knee  Mild swelling present  Incision sites: Sutures removed today.  Skin around suture sites do appear irritated from the sutures.  Range of motion: Full extension and appropriate flexion    IMAGING INTERPRETATION: Intra-op pictures were reviewed with patient and a copy given to him  The images were explained to the patient.     ASSESSMENT:    Chief Complaint   Patient presents with     RECHECK     Right knee arthroscopy with medial meniscus debridement.  DOS: 7/25/18 (13 days postop)     Surgical Followup     PREOPERATIVE DIAGNOSIS:  Medial meniscus tear, right knee. POSTOPERATIVE DIAGNOSIS:  Macerated complex tearing of the posterior bucket handle tear medial meniscus, right knee. PROCEDURE:  Arthroscopic debridement of medial meniscus.       ICD-10-CM    1. Complex tear of medial meniscus of right knee as current injury, initial encounter " S83.231A PHYSICAL THERAPY REFERRAL   2. Partial hamstring tear, sequela S76.319S PHYSICAL THERAPY REFERRAL     Patient is 13 days status post right medial meniscus debridement and plica shaving.  Patient progressing as expected however he is still struggling with hamstring discomfort.    Plan:   Sutures removed this visit.  Refill pain medication: Not needed  Physical Therapy: Patient is shown how to do a one leg squat.  Patient is scheduled for physical therapy to primarily address hamstring stretching and for regaining strength postop medial meniscus debridement  Continue elevation of affected extremity.  Return to clinic 6 weeks to evaluate return of strength    BP Readings from Last 1 Encounters:   08/07/18 127/82     BP noted to be well controlled today in office.     Cece Skelton PA-C   8/7/2018  8:04 AM      I attest I have seen and evaluated the patient.  I agree with above impression and plan.    Seth Basilio MD

## 2018-08-14 ENCOUNTER — HOSPITAL ENCOUNTER (OUTPATIENT)
Dept: PHYSICAL THERAPY | Facility: CLINIC | Age: 48
Setting detail: THERAPIES SERIES
End: 2018-08-14
Attending: ORTHOPAEDIC SURGERY
Payer: COMMERCIAL

## 2018-08-14 PROCEDURE — 40000718 ZZHC STATISTIC PT DEPARTMENT ORTHO VISIT: Performed by: PHYSICAL THERAPIST

## 2018-08-14 PROCEDURE — 97110 THERAPEUTIC EXERCISES: CPT | Mod: GP | Performed by: PHYSICAL THERAPIST

## 2018-08-14 PROCEDURE — 97161 PT EVAL LOW COMPLEX 20 MIN: CPT | Mod: GP | Performed by: PHYSICAL THERAPIST

## 2018-08-14 NOTE — PROGRESS NOTES
08/14/18 1049   General Information   Type of Visit Initial OP Ortho PT Evaluation   Start of Care Date 08/14/18   Referring Physician Dr. Seth Basilio   Patient/Family Goals Statement return to working out, make sure HS is healed, normal activity, walk and hike on hiking trip, kneeling   Orders Evaluate and Treat   Orders Comment HS stretching and strengthening   Date of Order 08/07/18   Surgical/Medical history reviewed Yes  (hyperlipidemia)   Body Part(s)   Body Part(s) Knee   Presentation and Etiology   Pertinent history of current problem (include personal factors and/or comorbidities that impact the POC) Known HS tear last summer water skiing, started to heal by March or April.  Wasn't able to run or do CrossFit, was pain and weak.  Starting in May or June and the knee was painful and sore to walk on, couldn't even sit.  Hurt to keep it bent.  Pain is significantly better since the scope.  Needs some strength and flexibility.  No restrictions right after surgery.  Had the sensation of knee giving way.   Impairments A. Pain;C. Swelling;D. Decreased ROM;E. Decreased flexibility;F. Decreased strength and endurance;H. Impaired gait   Functional Limitations perform activities of daily living;perform required work activities;perform desired leisure / sports activities   Symptom Location medial knee, maybe lat   How/Where did it occur (waterskiing)   Onset date of current episode/exacerbation (July 2017)   Chronicity Chronic   Pain rating (0-10 point scale) Other   Pain rating comment now: 0/10, best: 0/10, worst: 3/10   Pain quality (tension)   Frequency of pain/symptoms B. Intermittent   Pain/symptoms are: The same all the time   Pain/symptoms exacerbated by M. Other   Pain exacerbation comment pivoting, walking around a corner, standing too long   Pain/symptoms eased by K. Other   Pain eased by comment rest, iced for the first week   Progression of symptoms since onset: Improved   Current Level of Function    Current Community Support Family/friend caregiver   Patient role/employment history A. 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 No   Have you fallen and had an injury in the past year? No   Is patient a fall risk? No   Knee Objective Findings   Side (if bilateral, select both right and left) Right   Observation nearly 20 min late for eval, no acute distress   Integumentary  generalized edema, circumference mid patella: R 44.8 cm/ L 44.2 cm; incisions healing well   Posture kyphotic   Gait/Locomotion slight antalgic pattern, full WB, no AD   Knee ROM Comment R: 0 to 129, L 11 hyper to 138; slight strain with extension and slight pain with flex on the R   Knee/Hip Strength Comments no extensor lag with SLR    Knee Flexibility Comments HS length: 70 deg R/ 80 deg L; gastroc length: 5 deg R/ 0 deg L (hx of talar fx and surgery at age 15)   Right Quad Set Strength with palpation: 60-70% quad tone compared to L   Planned Therapy Interventions   Planned Therapy Interventions manual therapy;neuromuscular re-education;ROM;strengthening;stretching   Planned Modality Interventions   Planned Modality Interventions Comments as needed   Clinical Impression   Criteria for Skilled Therapeutic Interventions Met yes, treatment indicated   PT Diagnosis knee pain, knee weakness, knee edema   Influenced by the following impairments pain, weakness, edema   Functional limitations due to impairments kneeling, squatting, running, standing, work duties, fitness routine   Clinical Presentation Stable/Uncomplicated   Clinical Presentation Rationale History: chronic knee pain, Examination:   Clinical Decision Making (Complexity) Low complexity   Therapy Frequency (1-2x/wk)   Predicted Duration of Therapy Intervention (days/wks) 60 days as needed   Risk & Benefits of therapy have been  explained Yes   Patient, Family & other staff in agreement with plan of care Yes   Clinical Impression Comments Pt is 3 weeks s/p R medial meniscal debridedment with hx of R HS injury and presents with improving pain levels but residual LE weakness, edema and decreased LE flexibility.   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   Target Date 10/12/18   Ortho Goal 2   Goal Identifier 2   Goal Description Pt will improve his score on the LE Functional Scale (LEFS) to 70/80 or greater indicating a clinically meaningful improvement in pain and function over time.   Target Date 10/12/18   Ortho Goal 3   Goal Identifier 3   Goal Description Pt will return to 75% or greater of his usual workout return for improved fitness.   Target Date 10/12/18   Total Evaluation Time   Total Evaluation Time 18

## 2018-08-22 ENCOUNTER — HOSPITAL ENCOUNTER (OUTPATIENT)
Dept: PHYSICAL THERAPY | Facility: CLINIC | Age: 48
Setting detail: THERAPIES SERIES
End: 2018-08-22
Attending: ORTHOPAEDIC SURGERY
Payer: COMMERCIAL

## 2018-08-22 PROCEDURE — 40000718 ZZHC STATISTIC PT DEPARTMENT ORTHO VISIT: Performed by: PHYSICAL THERAPIST

## 2018-08-22 PROCEDURE — 97110 THERAPEUTIC EXERCISES: CPT | Mod: GP | Performed by: PHYSICAL THERAPIST

## 2018-08-29 ENCOUNTER — HOSPITAL ENCOUNTER (OUTPATIENT)
Dept: PHYSICAL THERAPY | Facility: CLINIC | Age: 48
Setting detail: THERAPIES SERIES
End: 2018-08-29
Attending: ORTHOPAEDIC SURGERY
Payer: COMMERCIAL

## 2018-08-29 PROCEDURE — 40000718 ZZHC STATISTIC PT DEPARTMENT ORTHO VISIT: Performed by: PHYSICAL THERAPIST

## 2018-08-29 PROCEDURE — 97110 THERAPEUTIC EXERCISES: CPT | Mod: GP | Performed by: PHYSICAL THERAPIST

## 2018-09-05 ENCOUNTER — HOSPITAL ENCOUNTER (OUTPATIENT)
Dept: PHYSICAL THERAPY | Facility: CLINIC | Age: 48
Setting detail: THERAPIES SERIES
End: 2018-09-05
Attending: ORTHOPAEDIC SURGERY
Payer: COMMERCIAL

## 2018-09-05 PROCEDURE — 40000718 ZZHC STATISTIC PT DEPARTMENT ORTHO VISIT: Performed by: PHYSICAL THERAPIST

## 2018-09-05 PROCEDURE — 97110 THERAPEUTIC EXERCISES: CPT | Mod: GP | Performed by: PHYSICAL THERAPIST

## 2018-09-18 ENCOUNTER — OFFICE VISIT (OUTPATIENT)
Dept: ORTHOPEDICS | Facility: CLINIC | Age: 48
End: 2018-09-18
Payer: COMMERCIAL

## 2018-09-18 VITALS
TEMPERATURE: 97.8 F | HEIGHT: 74 IN | BODY MASS INDEX: 31.95 KG/M2 | SYSTOLIC BLOOD PRESSURE: 138 MMHG | DIASTOLIC BLOOD PRESSURE: 84 MMHG | WEIGHT: 249 LBS

## 2018-09-18 DIAGNOSIS — S83.231A COMPLEX TEAR OF MEDIAL MENISCUS OF RIGHT KNEE AS CURRENT INJURY, INITIAL ENCOUNTER: ICD-10-CM

## 2018-09-18 DIAGNOSIS — S76.319S: Primary | ICD-10-CM

## 2018-09-18 PROCEDURE — 99024 POSTOP FOLLOW-UP VISIT: CPT | Performed by: PHYSICIAN ASSISTANT

## 2018-09-18 ASSESSMENT — PAIN SCALES - GENERAL: PAINLEVEL: NO PAIN (0)

## 2018-09-18 NOTE — LETTER
"    9/18/2018         RE: Carlos Keyes  [address] [facility] [address]        Dear Colleague,    Thank you for referring your patient, Carlos Keyes, to the [facility]. Please see a copy of my visit note below.    HISTORY OF PRESENT ILLNESS:    Carlos Keyes is a 48 year old male who is seen in follow up for   Chief Complaint   Patient presents with     RECHECK     Right knee arthroscopy with medial meniscus debridement.  DOS: 7/25/18 (8 weeks postop)     Surgical Followup     PREOPERATIVE DIAGNOSIS:  Medial meniscus tear, right knee. POSTOPERATIVE DIAGNOSIS:  Macerated complex tearing of the posterior bucket handle tear medial meniscus, right knee. PROCEDURE:  Arthroscopic debridement of medial meniscus.   Interval: Patient reports overall he is improving in function and pain levels for his right knee/lower leg.  Patient states he had difficulties going downstairs due to his partial hamstring tear.  He now has no difficulties.  Patient has been attending physical therapy and he does home exercises about 3-4 times per week specifically for the hamstring issues.  This has improved considerably.  Patient evaluation done with Dr. Basilio    Physical Exam:  Vitals: /84  Temp 97.8  F (36.6  C) (Temporal)  Ht 1.88 m (6' 2\")  Wt 112.9 kg (249 lb)  BMI 31.97 kg/m2  BMI= Body mass index is 31.97 kg/(m^2).  Constitutional: healthy, alert and no acute distress   Psychiatric: mentation appears normal and affect normal/bright  NEURO: no focal deficits  Location of surgery: Right knee  No visible swelling  Incision sites: Are healed  Range of motion: Full and symmetrical range of motion.  He is able to fully extend the knee.  Quad tone now appears symmetrical bilaterally.  Patient is able to demonstrate some leg squat with little difficulty     ASSESSMENT:    Chief Complaint   Patient presents with     RECHECK     Right knee arthroscopy with medial meniscus debridement.  DOS: 7/25/18 (8 weeks " postop)     Surgical Followup     PREOPERATIVE DIAGNOSIS:  Medial meniscus tear, right knee. POSTOPERATIVE DIAGNOSIS:  Macerated complex tearing of the posterior bucket handle tear medial meniscus, right knee. PROCEDURE:  Arthroscopic debridement of medial meniscus.       ICD-10-CM    1. Partial hamstring tear, sequela S76.319S    2. Complex tear of medial meniscus of right knee as current injury, initial encounter S83.231A      Patient is 8 weeks status post right knee debridement of meniscal tear.  Patient also with partial hamstring tear.  Patient overall right lower extremity function is improving in pain levels and increasing in strength.    Plan:   Patient to continue strengthening exercises as directed by physical therapy.  Return to clinic as needed for concerns    BP Readings from Last 1 Encounters:   09/18/18 138/84     BP noted to be slightly elevated today in office.  Patient to follow up with Primary Care provider regarding elevated blood pressure.    Cece Skelton PA-C   9/18/2018  8:11 AM      I attest I have seen and evaluated the patient.  I agree with above impression and plan.    Seth Basilio MD    Again, thank you for allowing me to participate in the care of your patient.        Sincerely,        Seth Basilio MD

## 2018-09-18 NOTE — PROGRESS NOTES
"HISTORY OF PRESENT ILLNESS:    Carlos Keyes is a 48 year old male who is seen in follow up for   Chief Complaint   Patient presents with     RECHECK     Right knee arthroscopy with medial meniscus debridement.  DOS: 7/25/18 (8 weeks postop)     Surgical Followup     PREOPERATIVE DIAGNOSIS:  Medial meniscus tear, right knee. POSTOPERATIVE DIAGNOSIS:  Macerated complex tearing of the posterior bucket handle tear medial meniscus, right knee. PROCEDURE:  Arthroscopic debridement of medial meniscus.   Interval: Patient reports overall he is improving in function and pain levels for his right knee/lower leg.  Patient states he had difficulties going downstairs due to his partial hamstring tear.  He now has no difficulties.  Patient has been attending physical therapy and he does home exercises about 3-4 times per week specifically for the hamstring issues.  This has improved considerably.  Patient evaluation done with Dr. Basilio    Physical Exam:  Vitals: /84  Temp 97.8  F (36.6  C) (Temporal)  Ht 1.88 m (6' 2\")  Wt 112.9 kg (249 lb)  BMI 31.97 kg/m2  BMI= Body mass index is 31.97 kg/(m^2).  Constitutional: healthy, alert and no acute distress   Psychiatric: mentation appears normal and affect normal/bright  NEURO: no focal deficits  Location of surgery: Right knee  No visible swelling  Incision sites: Are healed  Range of motion: Full and symmetrical range of motion.  He is able to fully extend the knee.  Quad tone now appears symmetrical bilaterally.  Patient is able to demonstrate some leg squat with little difficulty     ASSESSMENT:    Chief Complaint   Patient presents with     RECHECK     Right knee arthroscopy with medial meniscus debridement.  DOS: 7/25/18 (8 weeks postop)     Surgical Followup     PREOPERATIVE DIAGNOSIS:  Medial meniscus tear, right knee. POSTOPERATIVE DIAGNOSIS:  Macerated complex tearing of the posterior bucket handle tear medial meniscus, right knee. PROCEDURE:  Arthroscopic " debridement of medial meniscus.       ICD-10-CM    1. Partial hamstring tear, sequela S76.319S    2. Complex tear of medial meniscus of right knee as current injury, initial encounter S83.231A      Patient is 8 weeks status post right knee debridement of meniscal tear.  Patient also with partial hamstring tear.  Patient overall right lower extremity function is improving in pain levels and increasing in strength.    Plan:   Patient to continue strengthening exercises as directed by physical therapy.  Return to clinic as needed for concerns    BP Readings from Last 1 Encounters:   09/18/18 138/84     BP noted to be slightly elevated today in office.  Patient to follow up with Primary Care provider regarding elevated blood pressure.    Cece Skelton PA-C   9/18/2018  8:11 AM      I attest I have seen and evaluated the patient.  I agree with above impression and plan.    Seth Basilio MD

## 2018-09-25 NOTE — ADDENDUM NOTE
Encounter addended by: Keri Brown, PT on: 9/25/2018  4:33 PM<BR>     Actions taken: Episode resolved, Pend clinical note, Sign clinical note

## 2018-09-25 NOTE — PROGRESS NOTES
Outpatient Physical Therapy Discharge Note     Patient: Carlos Keyes    : 1970    Beginning/End Dates of Reporting Period: 18  to 18    Referring Provider: Chetna    Therapy Diagnosis: knee pain, knee weakness, knee edema     Client Self Report: Able to do the ex 3-4 times since here.  The one for the [employer] is the most work.  Did step downs without a step.  Driving with less pain, feels like holds him up better when stepping down.    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  Doing 100% of duties but maybe with some adaptations.   Target Date 10/12/18   Date Met  18   Progress:     Goal Identifier 2   Goal Description Pt will improve his score on the LE Functional Scale (LEFS) to 70/80 or greater indicating a clinically meaningful improvement in pain and function over time.  Goal is ongoing.   Target Date 10/12/18   Date Met      Progress:     Goal Identifier 3   Goal Description Pt will return to 75% or greater of his usual workout return for improved fitness.  Pt feels he will be able to resume a new and improved workout now and may not go back to crossfit activities specifically.   Target Date 10/12/18   Date Met      Progress:     Progress Toward Goals: Met goal 1, goal 3 met with changes to how he will do home routine, goal 2 in progress; motivated but could be more consistent with HEP for best outcome.; pt reports more strength with downward stepping, less pain with driving, likes his new ex choices.    Plan: Discharge from therapy.    Reason for Discharge: goals met or sufficient for discharge    Equipment Issued: none    Discharge Plan: Patient to continue home program.

## 2019-04-11 ENCOUNTER — TELEPHONE (OUTPATIENT)
Dept: FAMILY MEDICINE | Facility: CLINIC | Age: 49
End: 2019-04-11

## 2019-04-11 NOTE — LETTER
[address] [id number] [phone]        Carlos Keyes  [address]  [facility] [address]      April 11, 2019      Dear Carlos,    I care about your health and have reviewed your health plan, including your medical conditions, medication list, and lab results and am making recommendations based on this review, to better manage your health.    You are in particular need of attention regarding:  -Colon Cancer Screening    I am recommending that you:  -schedule a COLONOSCOPY.  Colon cancer is now the second leading cause of cancer-related deaths in the [place] for both men and women.  There are over 130,000 new cases and 50,000 deaths per year from colon cancer.  A recent study, which included patients ages 55 to 79 found 50,400 American deaths from colorectal cancer could have been prevented if patients had undergone a colonoscopy in the previous 10 years.    If you have not had a colonoscopy, we encourage you to schedule by contacting us at [phone], Monday through Friday.  After hours, you may leave a message and we will return your call during normal business hours.      There is another option called a FIT test, if you don t wish to have a colonoscopy, which needs to be repeated every year.  It does replace the colonoscopy for colorectal cancer screening and can detect hidden bleeding in the lower colon.  If a positive result is obtained, you would be referred for a colonoscopy. Please discuss this option with your provider.      For patients under/uninsured, we recommend you contact the [employer] program. [employer] is a free colorectal cancer screening program that provides colonoscopies for eligible under/uninsured [place] men and women. If you are interested in receiving a free colonoscopy, please call [employer] at [phone] (mention code [employer]) to see if you re eligible.     If you've had the preventative screening  completed at another facility or feel you're not due for this screening, please call our clinic at the number listed above or send us a My Chart message so we can update our records. We would like to thank you in advance for taking the time to take care of your health.  If you have any questions, please don t hesitate to contact our clinic.    Sincerely,       Your [facility] Team

## 2019-04-11 NOTE — TELEPHONE ENCOUNTER
Panel Management Review      Patient has the following on his problem list: None      Composite cancer screening  Chart review shows that this patient is due/due soon for the following Colonoscopy and Fecal Colorectal (FIT)  Summary:    Patient is due/failing the following:   COLONOSCOPY and FIT    Action needed:   Patient needs referral/order: fit test colonoscopy     Type of outreach:    Sent [facility] message.    Questions for provider review:    None                                                                                                                                    Stephanie Sahu MA        Chart routed to Care Team .

## 2019-06-19 ENCOUNTER — OFFICE VISIT (OUTPATIENT)
Dept: FAMILY MEDICINE | Facility: CLINIC | Age: 49
End: 2019-06-19
Payer: COMMERCIAL

## 2019-06-19 ENCOUNTER — HOSPITAL ENCOUNTER (OUTPATIENT)
Dept: GENERAL RADIOLOGY | Facility: CLINIC | Age: 49
Discharge: HOME | End: 2019-06-19
Attending: FAMILY MEDICINE | Admitting: FAMILY MEDICINE
Payer: COMMERCIAL

## 2019-06-19 VITALS
BODY MASS INDEX: 29.19 KG/M2 | TEMPERATURE: 96.6 F | HEIGHT: 75 IN | WEIGHT: 234.8 LBS | SYSTOLIC BLOOD PRESSURE: 122 MMHG | RESPIRATION RATE: 20 BRPM | DIASTOLIC BLOOD PRESSURE: 82 MMHG | HEART RATE: 80 BPM

## 2019-06-19 DIAGNOSIS — M79.604 PAIN OF RIGHT LOWER EXTREMITY: Primary | ICD-10-CM

## 2019-06-19 DIAGNOSIS — M79.604 PAIN OF RIGHT LOWER EXTREMITY: ICD-10-CM

## 2019-06-19 DIAGNOSIS — Z12.5 SCREENING PSA (PROSTATE SPECIFIC ANTIGEN): ICD-10-CM

## 2019-06-19 DIAGNOSIS — Z12.11 SCREENING FOR COLON CANCER: ICD-10-CM

## 2019-06-19 LAB
BASOPHILS # BLD AUTO: 0 10E9/L (ref 0–0.2)
BASOPHILS NFR BLD AUTO: 0.6 %
DIFFERENTIAL METHOD BLD: NORMAL
EOSINOPHIL NFR BLD AUTO: 2.9 %
ERYTHROCYTE [SEDIMENTATION RATE] IN BLOOD BY WESTERGREN METHOD: 7 MM/H (ref 0–20)
IMM GRANULOCYTES # BLD: 0 10E9/L (ref 0–0.4)
IMM GRANULOCYTES NFR BLD: 0.2 %
LYMPHOCYTES # BLD AUTO: 1.2 10E9/L (ref 0.8–5.3)
LYMPHOCYTES NFR BLD AUTO: 22.6 %
MONOCYTES # BLD AUTO: 0.4 10E9/L (ref 0–1.3)
MONOCYTES NFR BLD AUTO: 7.9 %
NEUTROPHILS # BLD AUTO: 3.4 10E9/L (ref 1.6–8.3)
NEUTROPHILS NFR BLD AUTO: 65.8 %
NRBC # BLD AUTO: 0 10*3/UL
NRBC BLD AUTO-RTO: 0 /100
URATE SERPL-MCNC: 7.1 MG/DL (ref 3.5–7.2)
WBC # BLD AUTO: 5.2 10E9/L (ref 4–11)

## 2019-06-19 PROCEDURE — 85004 AUTOMATED DIFF WBC COUNT: CPT | Performed by: FAMILY MEDICINE

## 2019-06-19 PROCEDURE — 85652 RBC SED RATE AUTOMATED: CPT | Performed by: FAMILY MEDICINE

## 2019-06-19 PROCEDURE — 99213 OFFICE O/P EST LOW 20 MIN: CPT | Performed by: FAMILY MEDICINE

## 2019-06-19 PROCEDURE — 73660 X-RAY EXAM OF TOE(S): CPT | Mod: TC,RT

## 2019-06-19 PROCEDURE — 84550 ASSAY OF BLOOD/URIC ACID: CPT | Performed by: FAMILY MEDICINE

## 2019-06-19 PROCEDURE — 36415 COLL VENOUS BLD VENIPUNCTURE: CPT | Performed by: FAMILY MEDICINE

## 2019-06-19 PROCEDURE — 85048 AUTOMATED LEUKOCYTE COUNT: CPT | Performed by: FAMILY MEDICINE

## 2019-06-19 PROCEDURE — G0103 PSA SCREENING: HCPCS | Performed by: FAMILY MEDICINE

## 2019-06-19 RX ORDER — INDOMETHACIN 50 MG/1
50 CAPSULE ORAL 2 TIMES DAILY WITH MEALS
Qty: 20 CAPSULE | Refills: 0 | Status: SHIPPED | OUTPATIENT
Start: 2019-06-19 | End: 2019-09-06

## 2019-06-19 ASSESSMENT — MIFFLIN-ST. JEOR: SCORE: 2002.74

## 2019-06-19 ASSESSMENT — PAIN SCALES - GENERAL: PAINLEVEL: MODERATE PAIN (4)

## 2019-06-19 NOTE — PROGRESS NOTES
Subjective     Carlos Keyes is a 48 year old male who presents to clinic today for the following health issues:    Chief Complaint   Patient presents with     Toe Pain     x's 3 weeks, no known injury         note dictated and pending  electronically signed  Aguila Mead MD

## 2019-06-20 ENCOUNTER — TELEPHONE (OUTPATIENT)
Dept: FAMILY MEDICINE | Facility: OTHER | Age: 49
End: 2019-06-20

## 2019-06-20 DIAGNOSIS — M10.00 ACUTE IDIOPATHIC GOUT, UNSPECIFIED SITE: Primary | ICD-10-CM

## 2019-06-20 RX ORDER — ALLOPURINOL 100 MG/1
100 TABLET ORAL DAILY
Qty: 90 TABLET | Refills: 1 | Status: SHIPPED | OUTPATIENT
Start: 2019-06-20 | End: 2019-09-19

## 2019-06-20 NOTE — TELEPHONE ENCOUNTER
Called with labs re his toe pain  Uric acid 7.1 .likely implicating gout    Diet has been given, will start allopurinol  Warned of side effects of meds  cb if ?'s  Janee uric in 2 months

## 2019-06-21 LAB — PSA SERPL-ACNC: 0.55 UG/L (ref 0–4)

## 2019-06-22 NOTE — PROGRESS NOTES
Visit Date:   2019      SUBJECTIVE: Pain in right great toe.  This has been ongoing for several weeks.  He has not had any known injury, but thinks he may have gout.  He has not had trouble with this before.  Questionable whether he has family history of gout.  He is not on any medications that likely would precipitate gout, though he does use a moderate amount of alcohol, i.e., a couple of drinks of whiskey nightly.  He denied any fevers or chills, and is not a diabetic.      OBJECTIVE:  Exam shows him to be in no distress.  He appears to be in very good physical shape.   VITAL SIGNS:  Normal with a blood pressure 122/82, pulse 80, temperature 96.6.  Exam is limited to the right foot which shows a normal circulation, but the great toe is slightly diffusely swollen and there is a little redness at the MP joint.  There is no drainage or obvious signs of cellulitis; the toe is slightly reddened.  There is no tenderness to palpation.      X-rays taken show some degenerative changes.  Sed rate and white count are normal.  Uric acid subsequently returned at 7.1.      IMPRESSION:  Subacute gouty arthritis of right great toe.      PLAN:  He is placed on indomethacin 50 mg twice daily for 5 days, then once daily for 5 days.  Low purine diet given.  Advised to start allopurinol in about 2 weeks after this has resolved.  Additionally, if no improvement in a few days or worsening, he is to call back for further advice.  I did give him printed information on gout.  Inadvertently, he was not informed that he is overdue in having a colonoscopy and we will remind him of that subsequently.         FRANTZ COLLINS MD             D: 2019   T: 2019   MT: NICA      Name:     OBIE TAVARES   MRN:      [id number]        Account:      [id number]   :      1970           Visit Date:   2019      Document: [id number]

## 2019-06-25 ENCOUNTER — TELEPHONE (OUTPATIENT)
Dept: FAMILY MEDICINE | Facility: CLINIC | Age: 49
End: 2019-06-25

## 2019-06-25 NOTE — LETTER
[facility] [address] [id number] [phone]      June 27, 2019      Carlos Keyes  [address]  [facility] [address]      Dear Carlos:     To better serve you, we are sending this letter to notify you that we have attempted to contact you by telephone to schedule the following procedure(s) ordered by your physician.     ____X___   Colonoscopy   _______   Upper GI Endoscopy (EGD)   _______   Colonoscopy and Upper GI Endoscopy    To provide the highest quality of care, we strongly encourage you to call and schedule the prescribed test/procedure at your earliest convenience.   The number to the Specialty Scheduling department is [phone] and the hours are 8:00am - 4:30pm Monday through Friday.   We look forward to hearing from you.    Sincerely,    [facility]

## 2019-06-25 NOTE — TELEPHONE ENCOUNTER
Left message for patient to return call to schedule colonoscopy or EGD. If Samina or Sadia are unavailable, please transfer to the surgery center.

## 2019-06-27 NOTE — TELEPHONE ENCOUNTER
Left message for patient to return call to schedule colonoscopy or EGD. If Samina or Sadia are unavailable, please transfer to the surgery center.       Letter sent

## 2019-07-01 NOTE — TELEPHONE ENCOUNTER
Date of colonoscopy/EGD: 7/16  Surgeon: Dr. Miles  Prep:Miralax  Packet:Colonoscopy/EGD instructions mailed to patient's home address.   Date: 7/1/2019      Surgery Scheduler

## 2019-07-01 NOTE — TELEPHONE ENCOUNTER
Please call pt to schedule colonoscopy. Pt was transferred to [facility] but then they transferred him back, She said she has never scheduled one and that Kristin has not walked her through it yet.

## 2019-07-16 ENCOUNTER — HOSPITAL ENCOUNTER (OUTPATIENT)
Facility: CLINIC | Age: 49
Discharge: HOME | End: 2019-07-16
Attending: SURGERY | Admitting: SURGERY
Payer: COMMERCIAL

## 2019-07-16 ENCOUNTER — ANESTHESIA (OUTPATIENT)
Dept: GASTROENTEROLOGY | Facility: CLINIC | Age: 49
End: 2019-07-16
Payer: COMMERCIAL

## 2019-07-16 ENCOUNTER — ANESTHESIA EVENT (OUTPATIENT)
Dept: GASTROENTEROLOGY | Facility: CLINIC | Age: 49
End: 2019-07-16
Payer: COMMERCIAL

## 2019-07-16 VITALS
OXYGEN SATURATION: 95 % | HEART RATE: 67 BPM | RESPIRATION RATE: 18 BRPM | TEMPERATURE: 98.7 F | SYSTOLIC BLOOD PRESSURE: 135 MMHG | DIASTOLIC BLOOD PRESSURE: 90 MMHG

## 2019-07-16 LAB — COLONOSCOPY: NORMAL

## 2019-07-16 PROCEDURE — 25000128 H RX IP 250 OP 636: Performed by: NURSE ANESTHETIST, CERTIFIED REGISTERED

## 2019-07-16 PROCEDURE — 25000125 ZZHC RX 250: Performed by: SURGERY

## 2019-07-16 PROCEDURE — 25000125 ZZHC RX 250: Performed by: NURSE ANESTHETIST, CERTIFIED REGISTERED

## 2019-07-16 PROCEDURE — 25800030 ZZH RX IP 258 OP 636: Performed by: NURSE ANESTHETIST, CERTIFIED REGISTERED

## 2019-07-16 PROCEDURE — G0121 COLON CA SCRN NOT HI RSK IND: HCPCS | Performed by: SURGERY

## 2019-07-16 PROCEDURE — 45378 DIAGNOSTIC COLONOSCOPY: CPT | Performed by: SURGERY

## 2019-07-16 PROCEDURE — 37000008 ZZH ANESTHESIA TECHNICAL FEE, 1ST 30 MIN: Performed by: SURGERY

## 2019-07-16 RX ORDER — LIDOCAINE 40 MG/G
CREAM TOPICAL
Status: DISCONTINUED | OUTPATIENT
Start: 2019-07-16 | End: 2019-07-16 | Stop reason: HOSPADM

## 2019-07-16 RX ORDER — FLUMAZENIL 0.1 MG/ML
0.2 INJECTION, SOLUTION INTRAVENOUS
Status: DISCONTINUED | OUTPATIENT
Start: 2019-07-16 | End: 2019-07-16 | Stop reason: HOSPADM

## 2019-07-16 RX ORDER — ONDANSETRON 2 MG/ML
4 INJECTION INTRAMUSCULAR; INTRAVENOUS EVERY 6 HOURS PRN
Status: DISCONTINUED | OUTPATIENT
Start: 2019-07-16 | End: 2019-07-16 | Stop reason: HOSPADM

## 2019-07-16 RX ORDER — PROPOFOL 10 MG/ML
INJECTION, EMULSION INTRAVENOUS CONTINUOUS PRN
Status: DISCONTINUED | OUTPATIENT
Start: 2019-07-16 | End: 2019-07-16

## 2019-07-16 RX ORDER — LIDOCAINE HYDROCHLORIDE 20 MG/ML
INJECTION, SOLUTION INFILTRATION; PERINEURAL PRN
Status: DISCONTINUED | OUTPATIENT
Start: 2019-07-16 | End: 2019-07-16

## 2019-07-16 RX ORDER — ONDANSETRON 2 MG/ML
4 INJECTION INTRAMUSCULAR; INTRAVENOUS
Status: DISCONTINUED | OUTPATIENT
Start: 2019-07-16 | End: 2019-07-16 | Stop reason: HOSPADM

## 2019-07-16 RX ORDER — PROPOFOL 10 MG/ML
INJECTION, EMULSION INTRAVENOUS PRN
Status: DISCONTINUED | OUTPATIENT
Start: 2019-07-16 | End: 2019-07-16

## 2019-07-16 RX ORDER — ONDANSETRON 4 MG/1
4 TABLET, ORALLY DISINTEGRATING ORAL EVERY 6 HOURS PRN
Status: DISCONTINUED | OUTPATIENT
Start: 2019-07-16 | End: 2019-07-16 | Stop reason: HOSPADM

## 2019-07-16 RX ORDER — NALOXONE HYDROCHLORIDE 0.4 MG/ML
.1-.4 INJECTION, SOLUTION INTRAMUSCULAR; INTRAVENOUS; SUBCUTANEOUS
Status: DISCONTINUED | OUTPATIENT
Start: 2019-07-16 | End: 2019-07-16 | Stop reason: HOSPADM

## 2019-07-16 RX ORDER — SODIUM CHLORIDE, SODIUM LACTATE, POTASSIUM CHLORIDE, CALCIUM CHLORIDE 600; 310; 30; 20 MG/100ML; MG/100ML; MG/100ML; MG/100ML
INJECTION, SOLUTION INTRAVENOUS CONTINUOUS
Status: DISCONTINUED | OUTPATIENT
Start: 2019-07-16 | End: 2019-07-16 | Stop reason: HOSPADM

## 2019-07-16 RX ADMIN — PROPOFOL 100 MG: 10 INJECTION, EMULSION INTRAVENOUS at 09:26

## 2019-07-16 RX ADMIN — LIDOCAINE HYDROCHLORIDE 40 MG: 20 INJECTION, SOLUTION INFILTRATION; PERINEURAL at 09:24

## 2019-07-16 RX ADMIN — SODIUM CHLORIDE, POTASSIUM CHLORIDE, SODIUM LACTATE AND CALCIUM CHLORIDE: 600; 310; 30; 20 INJECTION, SOLUTION INTRAVENOUS at 08:30

## 2019-07-16 RX ADMIN — PROPOFOL 100 MG: 10 INJECTION, EMULSION INTRAVENOUS at 09:24

## 2019-07-16 RX ADMIN — LIDOCAINE HYDROCHLORIDE 0.2 ML: 10 INJECTION, SOLUTION EPIDURAL; INFILTRATION; INTRACAUDAL; PERINEURAL at 08:30

## 2019-07-16 RX ADMIN — PROPOFOL 150 MCG/KG/MIN: 10 INJECTION, EMULSION INTRAVENOUS at 09:23

## 2019-07-16 NOTE — H&P
Patient seen for Endoscopy    HPI:  Patient is a 48 year old male with no active GI issues here for screening colonoscopy. Not taking blood thinning medications. No MI or CVA history. No issues with previous sedation. No recent acute illness.    Review Of Systems    Skin: negative  Ears/Nose/Throat: negative  Respiratory: No shortness of breath, dyspnea on exertion, cough, or hemoptysis  Cardiovascular: negative  Gastrointestinal: negative  Genitourinary: negative  Musculoskeletal: negative  Neurologic: negative  Hematologic/Lymphatic/Immunologic: negative  Endocrine: negative      Past Medical History:   Diagnosis Date     Toe pain 1/29/2009       Past Surgical History:   Procedure Laterality Date     ARTHROSCOPY KNEE WITH DEBRIDEMENT JOINT, COMBINED Right 7/25/2018    Procedure: ARTHROSCOPY KNEE WITH DEBRIDEMENT JOINT;  Right knee arthroscopy with medial meniscus debridement;  Surgeon: Seth Basilio MD;  Location: [facility]     FRACTURE TX, ANKLE RT/LT      Fracture TX Ankle RT/LT     HERNIA REPAIR, INGUINAL RT/LT  10/13/2006    Laparoscopic left inguinal hernia repair.     OPEN REDUCTION INTERNAL FIXATION FOREARM Right 11/5/2014    Procedure: OPEN REDUCTION INTERNAL FIXATION FOREARM;  Surgeon: Seth Basilio MD;  Location: [facility]     REPAIR HAMMER TOE  10/14/2011    Procedure:REPAIR HAMMER TOE; Correction Toes 4th,5th Toe Right Foot; Surgeon:ASIF CRUM; Location:[facility]     VASECTOMY Bilateral 05/06/05       Family History   Problem Relation Age of Onset     C.A.D. Mother      C.A.D. Maternal Grandfather      Diabetes No family hx of      Cancer - colorectal No family hx of        Social History     Socioeconomic History     Marital status:      Spouse name: Anna     Number of children: 2     Years of education: 17     Highest education level: Not on file   Occupational History     Occupation: Self employed.   Social Needs     Financial resource strain: Not on file     Food insecurity:      Worry: Not on file     Inability: Not on file     Transportation needs:     Medical: Not on file     Non-medical: Not on file   Tobacco Use     Smoking status: Never Smoker     Smokeless tobacco: Former User     Types: Chew   Substance and Sexual Activity     Alcohol use: Yes     Comment: Occ     Drug use: No     Sexual activity: Yes     Partners: Female   Lifestyle     Physical activity:     Days per week: Not on file     Minutes per session: Not on file     Stress: Not on file   Relationships     Social connections:     Talks on phone: Not on file     Gets together: Not on file     Attends Confucianism service: Not on file     Active member of club or organization: Not on file     Attends meetings of clubs or organizations: Not on file     Relationship status: Not on file     Intimate partner violence:     Fear of current or ex partner: Not on file     Emotionally abused: Not on file     Physically abused: Not on file     Forced sexual activity: Not on file   Other Topics Concern      Service No     Blood Transfusions No     Caffeine Concern Yes     Comment: Pot of coffee a day     Occupational Exposure Yes     Hobby Hazards Yes     Sleep Concern No     Stress Concern Yes     Weight Concern Yes     Comment: Lose weight     Special Diet No     Back Care No     Exercise Yes     Bike Helmet No     Seat Belt Yes     Self-Exams No     Parent/sibling w/ CABG, MI or angioplasty before 65F 55M? Not Asked   Social History Narrative     Not on file       No current outpatient medications on file.       Medications and history reviewed    Physical exam:  Vitals: /86   Temp 98.7  F (37.1  C) (Oral)   SpO2 97%   BMI= There is no height or weight on file to calculate BMI.    Constitutional: Healthy, alert, non-distressed   Head: Normo-cephalic, atraumatic, no lesions, masses or tenderness   Cardiovascular: RRR, no new murmurs, +S1, +S2 heart sounds, no clicks, rubs or gallops   Respiratory: CTAB, no rales,  rhonchi or wheezing, equal chest rise, good respiratory effort   Gastrointestinal: Soft, non-tender, non distended, no rebound rigidity or guarding, no masses or hernias palpated   : Deferred  Musculoskeletal: Moves all extremities, normal  strength, no deformities noted   Skin: No suspicious lesions or rashes   Psychiatric: Mentation appears normal, affect appropriate   Hematologic/Lymphatic/Immunologic: Normal cervical and supraclavicular lymph nodes   Patient able to get up on table without difficulty.    Labs show:  No results found for this or any previous visit (from the past 24 hour(s)).    Assessment: Endoscopy  Plan: Pt cleared for anesthesia for proposed procedure.    Tyler Miles, DO

## 2019-07-16 NOTE — ANESTHESIA CARE TRANSFER NOTE
Patient: Carlos Keyes    Procedure(s):  COLONOSCOPY    Diagnosis: Screening for colon cancer  Diagnosis Additional Information: No value filed.    Anesthesia Type:   MAC     Note:  Airway :Face Mask  Patient transferred to:Phase II  Handoff Report: Identifed the Patient, Identified the Reponsible Provider, Reviewed the pertinent medical history, Discussed the surgical course, Reviewed Intra-OP anesthesia mangement and issues during anesthesia, Set expectations for post-procedure period and Allowed opportunity for questions and acknowledgement of understanding      Vitals: (Last set prior to Anesthesia Care Transfer)    CRNA VITALS  7/16/2019 0909 - 7/16/2019 0942      7/16/2019             Pulse:  77    SpO2:  100 %    Resp Rate (observed):  12                Electronically Signed By: CIRILO Valles CRNA  July 16, 2019  9:42 AM

## 2019-07-16 NOTE — DISCHARGE INSTRUCTIONS
[facility]    Home Care Following Endoscopy          Activity:    You have just undergone an endoscopic procedure usually performed with conscious sedation.  Do not work or operate machinery (including a car) for at least 12 hours.      I encourage you to walk and attempt to pass this air as soon as possible.    Diet:    Return to the diet you were on before your procedure but eat lightly for the first 12-24 hours.    Drink plenty of water.    Resume any regular medications unless otherwise advised by your physician.  Please begin any new medication prescribed as a result of your procedure as directed by your physician.     If you had any biopsy or polyp removed please refrain from aspirin or aspirin products for 2 days.  If on Coumadin please restart as instructed by your physician.   Pain:    You may take Tylenol as needed for pain.  Expected Recovery:    You can expect some mild abdominal fullness and/or discomfort due to the air used to inflate your intestinal tract.    Call Your Physician if You Have:    After Colonoscopy:  o Worsening persisting abdominal pain which is worse with activity.  o Fevers (>101 degrees F), chills or shakes.  o Passage of continued blood with bowel movements.   o   Any questions or concerns about your recovery, please call [phone] or after hours [phone] Nurse Advice Line.    Follow-up Care:  You had a clean colon.  No specimens were removed.  Plan for repeat colonoscopy in 10 years.

## 2019-07-16 NOTE — ANESTHESIA PREPROCEDURE EVALUATION
Anesthesia Pre-Procedure Evaluation    Patient: Carlos Keyes   MRN: [id number] : 1970          Preoperative Diagnosis: Screening for colon cancer    Procedure(s):  COLONOSCOPY    Past Medical History:   Diagnosis Date     Toe pain 2009     Past Surgical History:   Procedure Laterality Date     ARTHROSCOPY KNEE WITH DEBRIDEMENT JOINT, COMBINED Right 2018    Procedure: ARTHROSCOPY KNEE WITH DEBRIDEMENT JOINT;  Right knee arthroscopy with medial meniscus debridement;  Surgeon: Seth Basilio MD;  [facility]     FRACTURE TX, ANKLE RT/LT      Fracture TX Ankle RT/LT     HERNIA REPAIR, INGUINAL RT/LT  10/13/2006    Laparoscopic left inguinal hernia repair.     OPEN REDUCTION INTERNAL FIXATION FOREARM Right 2014    Procedure: OPEN REDUCTION INTERNAL FIXATION FOREARM;  Surgeon: Seth Basilio MD;  [facility]: [facility]     REPAIR HAMMER TOE  10/14/2011    Procedure:REPAIR HAMMER TOE; Correction Toes 4th,5th Toe Right Foot; Surgeon:ASIF CRUM; Location:[facility]     VASECTOMY Bilateral 05       Anesthesia Evaluation     . Pt has had prior anesthetic. Type: General and MAC    No history of anesthetic complications          ROS/MED HX    ENT/Pulmonary:  - neg pulmonary ROS     Neurologic:  - neg neurologic ROS     Cardiovascular:     (+) Dyslipidemia, ----. : . . . :. . Previous cardiac testing date:results:Stress Testdate:14 results:Baseline  The patient is in normal sinus rhythm.  Minor nonspecific ST depression in lead III.  Normal left ventricular wall motion.  The visual ejection fraction is estimated at 60-65%.     Stress  Exercise was stopped due to fatigue.  Mild, continuous 1/10 chest discomfort at rest that did not change with   stress.  The patient exercised 10:31 on the Mark Protocol.  No arrhythmia noted.  The EKG portion of this stress test was negative for inducible ischemia (see   echo results below).  There were no ST segment changes observed with  stress.  Normal resting wall motion and no stress-induced wall motion abnormality.  This was a normal stress echocardiogram.     Mitral Valve  The mitral valve is normal in structure and function.  There is trace mitral regurgitation.     Tricuspid Valve  The tricuspid valve is normal in structure and function.  There is trace tricuspid regurgitation.     Aortic Valve  The aortic valve is normal in structure and function.  No aortic regurgitation is present.  No aortic stenosis is present.ECG reviewed date:11/25/14 results:SR date: results:          METS/Exercise Tolerance:  >4 METS   Hematologic:  - neg hematologic  ROS       Musculoskeletal:  - neg musculoskeletal ROS       GI/Hepatic:  - neg GI/hepatic ROS       Renal/Genitourinary:  - ROS Renal section negative       Endo:  - neg endo ROS       Psychiatric:  - neg psychiatric ROS       Infectious Disease:  - neg infectious disease ROS       Malignancy:      - no malignancy   Other:    - neg other ROS                      Physical Exam  Normal systems: cardiovascular, pulmonary and dental    Airway   Mallampati: II  TM distance: >3 FB  Neck ROM: full    Dental     Cardiovascular   Rhythm and rate: regular and normal      Pulmonary    breath sounds clear to auscultation            Lab Results   Component Value Date    WBC 5.2 06/19/2019    HGB 13.9 12/04/2014    HCT 42.7 12/04/2014     12/04/2014    CRP 13.6 (H) 12/04/2014    SED 7 06/19/2019     07/18/2018    POTASSIUM 4.4 07/18/2018    CHLORIDE 102 07/18/2018    CO2 30 07/18/2018    BUN 16 07/18/2018    CR 1.00 07/18/2018    GLC 97 07/18/2018    MALIKA 9.2 07/18/2018    ALBUMIN 3.8 11/27/2014    PROTTOTAL 7.2 11/27/2014    ALT 17 11/27/2014    AST 9 11/27/2014    ALKPHOS 78 11/27/2014    BILITOTAL 0.6 11/27/2014    TSH 2.74 11/27/2014       Preop Vitals  BP Readings from Last 3 Encounters:   06/19/19 122/82   09/18/18 138/84   08/07/18 127/82    Pulse Readings from Last 3 Encounters:   06/19/19 80  "  08/07/18 80   07/25/18 74      Resp Readings from Last 3 Encounters:   06/19/19 20   07/25/18 16   06/04/18 18    SpO2 Readings from Last 3 Encounters:   07/25/18 94%   07/18/18 98%   12/16/16 97%      Temp Readings from Last 1 Encounters:   06/19/19 96.6  F (35.9  C) (Temporal)    Ht Readings from Last 1 Encounters:   06/19/19 1.892 m (6' 2.5\")      Wt Readings from Last 1 Encounters:   06/19/19 106.5 kg (234 lb 12.8 oz)    Estimated body mass index is 29.74 kg/m  as calculated from the following:    Height as of 6/19/19: 1.892 m (6' 2.5\").    Weight as of 6/19/19: 106.5 kg (234 lb 12.8 oz).       Anesthesia Plan      History & Physical Review  History and physical reviewed and following examination; no interval change.    ASA Status:  1 .    NPO Status:  > 8 hours    Plan for MAC with Intravenous and Propofol induction. Maintenance will be TIVA.  Reason for MAC:  Deep or markedly invasive procedure (G8)    Dr. Miles to complete H&P prior to anesthesia induction.      Postoperative Care      Consents  Anesthetic plan, risks, benefits and alternatives discussed with:  Patient..                 CIRILO Frazier CRNA  "

## 2019-07-16 NOTE — ANESTHESIA POSTPROCEDURE EVALUATION
Patient: Carlos Keyes    Procedure(s):  COLONOSCOPY    Diagnosis:Screening for colon cancer  Diagnosis Additional Information: No value filed.    Anesthesia Type:  MAC    Note:  Anesthesia Post Evaluation    Patient location during evaluation: Phase 2  Patient participation: Able to fully participate in evaluation  Level of consciousness: awake  Pain management: adequate  Airway patency: patent  Cardiovascular status: acceptable and hemodynamically stable  Respiratory status: acceptable, room air and nonlabored ventilation  Hydration status: stable  PONV: none     Anesthetic complications: None    Comments: Patient was happy with the anesthesia care received and no anesthesia related complications were noted.  I will follow up with the patient again if it is needed.        Last vitals:  Vitals:    07/16/19 0940 07/16/19 0941 07/16/19 0945   BP: 117/76 124/86    Pulse:  77    Resp: 18 18    Temp:      SpO2:  100% 95%         Electronically Signed By: CIRILO Valles CRNA  July 16, 2019  10:19 AM

## 2019-08-27 ENCOUNTER — NURSE TRIAGE (OUTPATIENT)
Dept: NURSING | Facility: CLINIC | Age: 49
End: 2019-08-27

## 2019-08-27 ENCOUNTER — OFFICE VISIT (OUTPATIENT)
Dept: FAMILY MEDICINE | Facility: CLINIC | Age: 49
End: 2019-08-27
Payer: COMMERCIAL

## 2019-08-27 VITALS
RESPIRATION RATE: 20 BRPM | DIASTOLIC BLOOD PRESSURE: 72 MMHG | SYSTOLIC BLOOD PRESSURE: 122 MMHG | WEIGHT: 235.4 LBS | BODY MASS INDEX: 29.82 KG/M2 | OXYGEN SATURATION: 97 % | TEMPERATURE: 99.3 F | HEART RATE: 72 BPM

## 2019-08-27 DIAGNOSIS — R19.7 VOMITING AND DIARRHEA: Primary | ICD-10-CM

## 2019-08-27 DIAGNOSIS — R11.10 VOMITING AND DIARRHEA: Primary | ICD-10-CM

## 2019-08-27 PROCEDURE — 87506 IADNA-DNA/RNA PROBE TQ 6-11: CPT | Performed by: PHYSICIAN ASSISTANT

## 2019-08-27 PROCEDURE — 99213 OFFICE O/P EST LOW 20 MIN: CPT | Performed by: PHYSICIAN ASSISTANT

## 2019-08-27 RX ORDER — METRONIDAZOLE 500 MG/1
500 TABLET ORAL 2 TIMES DAILY
Qty: 14 TABLET | Refills: 0 | Status: SHIPPED | OUTPATIENT
Start: 2019-08-27 | End: 2019-09-19

## 2019-08-27 ASSESSMENT — PAIN SCALES - GENERAL: PAINLEVEL: EXTREME PAIN (8)

## 2019-08-27 NOTE — TELEPHONE ENCOUNTER
Spouse calling to make an appointment, she declined a triage assessment.  Writer transferred the call back to scheduling.     Sushma Nuñez RN/FNA

## 2019-08-27 NOTE — PROGRESS NOTES
Subjective     Carlos Keyes is a 48 year old male who presents to clinic today for the following health issues:    HPI   ABDOMINAL   PAIN     Onset: 2 days    Description:   Character: Sharp and Cramping  Location: right upper quadrant left upper quadrant right lower quadrant left lower quadrant  Radiation: None    Intensity: moderate, severe    Progression of Symptoms:  same    Accompanying Signs & Symptoms:  Fever/Chills?: YES  Gas/Bloating: YES  Nausea: YES  Vomitting: no   Diarrhea?: YES  Constipation:no   Dysuria or Hematuria: no    History:   Trauma: no   Previous similar pain: no    Previous tests done: Colonoscopy, 1 month ago    Precipitating factors:   Does the pain change with:     Food: unsure     BM: no     Urination: no     Alleviating factors:  nothing    Therapies Tried and outcome: nothing    LMP:       Diarrhea  Onset: 2 days    Description:   Consistency of stool: watery  Blood in stool: no   Number of loose stools in past 24 hours: 12-18    Progression of Symptoms:  same    Accompanying Signs & Symptoms:  Fever: no   Nausea or vomiting; YES- nausea  Abdominal pain: YES  Episodes of constipation: no   Weight loss: no     History:   Ill contacts: no   Recent use of antibiotics: no    Recent travels: no          Recent medication-new or changes(Rx or OTC): no     Precipitating factors:   nothing    Alleviating factors:   nothing    Therapies Tried and outcome:  nothing; Outcome:       Patient is a 48 year old male who presents with 3 days of loose stools and abdominal cramping. This past weekend he dropped his daughter off at school and said that they stopped at a mexican restaurant prior to departure on Sunday. School is in [place], symptoms started late Sunday evening/early Monday morning. He estimates that he is having 12 or more stools per day and describes them as chicken soup in consistency. Patient is already maintaining a bland diet and has been trying to replenish fluids with water and gatorade  zero. He denies fever, blood in stool, vomiting, or recent use of antibiotics.       Reviewed and updated as needed this visit by Provider         Review of Systems   ROS COMP: Constitutional, HEENT, cardiovascular, pulmonary, gi and gu systems are negative, except as otherwise noted.      Objective    /72 (BP Location: Left arm, Patient Position: Chair, Cuff Size: Adult Large)   Pulse 72   Temp 99.3  F (37.4  C) (Oral)   Resp 20   Wt 106.8 kg (235 lb 6.4 oz)   SpO2 97%   BMI 29.82 kg/m    Body mass index is 29.82 kg/m .  Physical Exam   GENERAL: healthy, alert and no distress  RESP: lungs clear to auscultation - no rales, rhonchi or wheezes  CV: regular rate and rhythm, normal S1 S2, no S3 or S4, no murmur, click or rub, no peripheral edema and peripheral pulses strong  ABDOMEN: soft, nontender, no hepatosplenomegaly, no masses and bowel sounds hyperactive  BACK: no CVA tenderness, no paralumbar tenderness    Diagnostic Test Results:  Sent patient home with stool culture.         Assessment & Plan     1. Vomiting and diarrhea  Start metronidazole today and collect stool sample for culture. Patient is aware that pending the culture the treatment may be stopped or switched. In the meantime continue bland diet, adequate fluid intake and may use loperamide as needed.   - metroNIDAZOLE (FLAGYL) 500 MG tablet; Take 1 tablet (500 mg) by mouth 2 times daily for 7 days  Dispense: 14 tablet; Refill: 0  - Enteric Bacteria and Virus Panel by [facility] Stool; Future     Follow up with clinic for annual checkup or sooner if conditions change, worsen or fail to improve as expected.    Gerardo Eastman PA-C  [facility]

## 2019-08-28 DIAGNOSIS — R19.7 VOMITING AND DIARRHEA: ICD-10-CM

## 2019-08-28 DIAGNOSIS — R11.10 VOMITING AND DIARRHEA: ICD-10-CM

## 2019-09-06 ENCOUNTER — MYC MEDICAL ADVICE (OUTPATIENT)
Dept: FAMILY MEDICINE | Facility: CLINIC | Age: 49
End: 2019-09-06

## 2019-09-06 DIAGNOSIS — M79.604 PAIN OF RIGHT LOWER EXTREMITY: ICD-10-CM

## 2019-09-06 RX ORDER — INDOMETHACIN 50 MG/1
50 CAPSULE ORAL 2 TIMES DAILY WITH MEALS
Qty: 20 CAPSULE | Refills: 0 | Status: SHIPPED | OUTPATIENT
Start: 2019-09-06 | End: 2019-09-19

## 2019-09-16 ASSESSMENT — ENCOUNTER SYMPTOMS
EYE PAIN: 0
SHORTNESS OF BREATH: 0
COUGH: 0
MYALGIAS: 0
HEMATOCHEZIA: 0
NAUSEA: 0
JOINT SWELLING: 0
ARTHRALGIAS: 0
PALPITATIONS: 0
ABDOMINAL PAIN: 0
HEADACHES: 0
HEARTBURN: 0
NERVOUS/ANXIOUS: 0
DYSURIA: 0
FREQUENCY: 0
CHILLS: 0
WEAKNESS: 0
CONSTIPATION: 0
HEMATURIA: 0
PARESTHESIAS: 0
DIARRHEA: 0
SORE THROAT: 0
DIZZINESS: 0
FEVER: 0

## 2019-09-19 ENCOUNTER — OFFICE VISIT (OUTPATIENT)
Dept: FAMILY MEDICINE | Facility: CLINIC | Age: 49
End: 2019-09-19
Payer: COMMERCIAL

## 2019-09-19 VITALS
SYSTOLIC BLOOD PRESSURE: 122 MMHG | TEMPERATURE: 95.9 F | DIASTOLIC BLOOD PRESSURE: 82 MMHG | RESPIRATION RATE: 18 BRPM | OXYGEN SATURATION: 98 % | HEART RATE: 82 BPM | BODY MASS INDEX: 29.22 KG/M2 | WEIGHT: 235 LBS | HEIGHT: 75 IN

## 2019-09-19 DIAGNOSIS — M10.00 ACUTE IDIOPATHIC GOUT, UNSPECIFIED SITE: ICD-10-CM

## 2019-09-19 DIAGNOSIS — E78.5 HYPERLIPIDEMIA LDL GOAL <130: ICD-10-CM

## 2019-09-19 DIAGNOSIS — Z00.00 ROUTINE GENERAL MEDICAL EXAMINATION AT A HEALTH CARE FACILITY: Primary | ICD-10-CM

## 2019-09-19 LAB
ANION GAP SERPL CALCULATED.3IONS-SCNC: 8 MMOL/L (ref 3–14)
BUN SERPL-MCNC: 14 MG/DL (ref 7–30)
CALCIUM SERPL-MCNC: 9.4 MG/DL (ref 8.5–10.1)
CHLORIDE SERPL-SCNC: 108 MMOL/L (ref 94–109)
CHOLEST SERPL-MCNC: 176 MG/DL
CO2 SERPL-SCNC: 27 MMOL/L (ref 20–32)
CREAT SERPL-MCNC: 0.98 MG/DL (ref 0.66–1.25)
GFR SERPL CREATININE-BSD FRML MDRD: 89 ML/MIN/{1.73_M2}
GLUCOSE SERPL-MCNC: 91 MG/DL (ref 70–99)
HDLC SERPL-MCNC: 72 MG/DL
HIV 1+2 AB+HIV1 P24 AG SERPL QL IA: NONREACTIVE
LDLC SERPL CALC-MCNC: 69 MG/DL
NONHDLC SERPL-MCNC: 104 MG/DL
POTASSIUM SERPL-SCNC: 4.4 MMOL/L (ref 3.4–5.3)
SODIUM SERPL-SCNC: 143 MMOL/L (ref 133–144)
TRIGL SERPL-MCNC: 176 MG/DL
URATE SERPL-MCNC: 5.7 MG/DL (ref 3.5–7.2)

## 2019-09-19 PROCEDURE — 99396 PREV VISIT EST AGE 40-64: CPT | Performed by: FAMILY MEDICINE

## 2019-09-19 PROCEDURE — 80048 BASIC METABOLIC PNL TOTAL CA: CPT | Performed by: FAMILY MEDICINE

## 2019-09-19 PROCEDURE — 84550 ASSAY OF BLOOD/URIC ACID: CPT | Performed by: FAMILY MEDICINE

## 2019-09-19 PROCEDURE — 36415 COLL VENOUS BLD VENIPUNCTURE: CPT | Performed by: FAMILY MEDICINE

## 2019-09-19 PROCEDURE — 87389 HIV-1 AG W/HIV-1&-2 AB AG IA: CPT | Performed by: FAMILY MEDICINE

## 2019-09-19 PROCEDURE — 80061 LIPID PANEL: CPT | Performed by: FAMILY MEDICINE

## 2019-09-19 RX ORDER — SIMVASTATIN 20 MG
20 TABLET ORAL AT BEDTIME
Qty: 90 TABLET | Refills: 3 | Status: SHIPPED | OUTPATIENT
Start: 2019-09-19 | End: 2020-12-14

## 2019-09-19 RX ORDER — ALLOPURINOL 100 MG/1
100 TABLET ORAL DAILY
Qty: 90 TABLET | Refills: 3 | Status: SHIPPED | OUTPATIENT
Start: 2019-09-19 | End: 2020-11-04

## 2019-09-19 RX ORDER — INDOMETHACIN 50 MG/1
50 CAPSULE ORAL 2 TIMES DAILY WITH MEALS
Qty: 90 CAPSULE | Refills: 0 | Status: SHIPPED | OUTPATIENT
Start: 2019-09-19 | End: 2021-01-14

## 2019-09-19 ASSESSMENT — ENCOUNTER SYMPTOMS
SHORTNESS OF BREATH: 0
NERVOUS/ANXIOUS: 0
HEARTBURN: 0
WEAKNESS: 0
DIZZINESS: 0
MYALGIAS: 0
DYSURIA: 0
JOINT SWELLING: 0
PALPITATIONS: 0
CHILLS: 0
PARESTHESIAS: 0
COUGH: 0
FEVER: 0
SORE THROAT: 0
HEMATURIA: 0
CONSTIPATION: 0
EYE PAIN: 0
HEADACHES: 0
DIARRHEA: 0
FREQUENCY: 0
ARTHRALGIAS: 0
NAUSEA: 0
HEMATOCHEZIA: 0
ABDOMINAL PAIN: 0

## 2019-09-19 ASSESSMENT — PAIN SCALES - GENERAL: PAINLEVEL: NO PAIN (0)

## 2019-09-19 ASSESSMENT — MIFFLIN-ST. JEOR: SCORE: 1998.64

## 2019-09-19 NOTE — PROGRESS NOTES
SUBJECTIVE:   CC: Carlos Keyes is an 49 year old male who presents for preventative health visit.     Healthy Habits:     Getting at least 3 servings of Calcium per day:  Yes    Bi-annual eye exam:  Yes    Dental care twice a year:  Yes    Sleep apnea or symptoms of sleep apnea:  None    Diet:  Regular (no restrictions)    Frequency of exercise:  2-3 days/week    Duration of exercise:  30-45 minutes    Taking medications regularly:  Yes    Medication side effects:  Not applicable    PHQ-2 Total Score: 0    Additional concerns today:  No              Today's PHQ-2 Score:   PHQ-2 ( 1999 Pfizer) 9/16/2019   Q1: Little interest or pleasure in doing things 0   Q2: Feeling down, depressed or hopeless 0   PHQ-2 Score 0   Q1: Little interest or pleasure in doing things Not at all   Q2: Feeling down, depressed or hopeless Not at all   PHQ-2 Score 0       Abuse: Current or Past(Physical, Sexual or Emotional)- No  Do you feel safe in your environment? Yes    Social History     Tobacco Use     Smoking status: Never Smoker     Smokeless tobacco: Former User     Types: Chew   Substance Use Topics     Alcohol use: Yes     Comment: Occ     If you drink alcohol do you typically have >3 drinks per day or >7 drinks per week? No      AUDIT - Alcohol Use Disorders Identification Test - Reproduced from the World Health Organization Audit 2001 (Second Edition) 9/16/2019   1.  How often do you have a drink containing alcohol? 4 or more times a week   2.  How many drinks containing alcohol do you have on a typical day when you are drinking? 3 or 4   3.  How often do you have five or more drinks on one occasion? Weekly   4.  How often during the last year have you found that you were not able to stop drinking once you had started? Never   5.  How often during the last year have you failed to do what was normally expected of you because of drinking? Never   6.  How often during the last year have you needed a first drink in the morning to  "get yourself going after a heavy drinking session? Never   7.  How often during the last year have you had a feeling of guilt or remorse after drinking? Never   8.  How often during the last year have you been unable to remember what happened the night before because of your drinking? Never   9.  Have you or someone else been injured because of your drinking? No   10. Has a relative, friend, doctor or other health care worker been concerned about your drinking or suggested you cut down? No   TOTAL SCORE 8       Last PSA:   PSA   Date Value Ref Range Status   06/19/2019 0.55 0 - 4 ug/L Final     Comment:     Assay Method:  Chemiluminescence using Siemens Vista analyzer       Reviewed orders with patient. Reviewed health maintenance and updated orders accordingly -       Reviewed and updated as needed this visit by clinical staff  Tobacco  Allergies  Meds         Reviewed and updated as needed this visit by Provider            Review of Systems   Constitutional: Negative for chills and fever.   HENT: Negative for congestion, ear pain, hearing loss and sore throat.    Eyes: Negative for pain and visual disturbance.   Respiratory: Negative for cough and shortness of breath.    Cardiovascular: Negative for chest pain, palpitations and peripheral edema.   Gastrointestinal: Negative for abdominal pain, constipation, diarrhea, heartburn, hematochezia and nausea.   Genitourinary: Negative for discharge, dysuria, frequency, genital sores, hematuria, impotence and urgency.   Musculoskeletal: Negative for arthralgias, joint swelling and myalgias.   Skin: Negative for rash.   Neurological: Negative for dizziness, weakness, headaches and paresthesias.   Psychiatric/Behavioral: Negative for mood changes. The patient is not nervous/anxious.          OBJECTIVE:   /82   Pulse 82   Temp 95.9  F (35.5  C) (Temporal)   Resp 18   Ht 1.892 m (6' 2.5\")   Wt 106.6 kg (235 lb)   SpO2 98%   BMI 29.77 kg/m      Physical " "Exam  GENERAL: healthy, alert and no distress  EYES: Eyes grossly normal to inspection, PERRL and conjunctivae and sclerae normal  HENT: ear canals and TM's normal, nose and mouth without ulcers or lesions  NECK: no adenopathy, no asymmetry, masses, or scars and thyroid normal to palpation  RESP: lungs clear to auscultation - no rales, rhonchi or wheezes  CV: regular rate and rhythm, normal S1 S2, no S3 or S4, no murmur, click or rub, no peripheral edema and peripheral pulses strong  ABDOMEN: soft, nontender, no hepatosplenomegaly, no masses and bowel sounds normal  MS: no gross musculoskeletal defects noted, no edema  SKIN: no suspicious lesions or rashes  NEURO: Normal strength and tone, mentation intact and speech normal  PSYCH: mentation appears normal, affect normal/bright    Diagnostic Test Results:  Labs reviewed in Epic    ASSESSMENT/PLAN:       ICD-10-CM    1. Routine general medical examination at a health care facility Z00.00 HIV Antigen Antibody Combo   2. Acute idiopathic gout, unspecified site M10.00 allopurinol (ZYLOPRIM) 100 MG tablet     indomethacin (INDOCIN) 50 MG capsule     Uric acid     Basic metabolic panel   3. Hyperlipidemia LDL goal <130 E78.5 simvastatin (ZOCOR) 20 MG tablet     Lipid panel reflex to direct LDL Fasting         COUNSELING:   Reviewed preventive health counseling, as reflected in patient instructions    Estimated body mass index is 29.77 kg/m  as calculated from the following:    Height as of this encounter: 1.892 m (6' 2.5\").    Weight as of this encounter: 106.6 kg (235 lb).          reports that he has never smoked. He has quit using smokeless tobacco. His smokeless tobacco use included chew.      Counseling Resources:  ATP IV Guidelines  Pooled Cohorts Equation Calculator  FRAX Risk Assessment  ICSI Preventive Guidelines  Dietary Guidelines for Americans, 2010  USDA's MyPlate  ASA Prophylaxis  Lung CA Screening    Darek Marks MD  [facility]  "

## 2019-09-28 ENCOUNTER — HEALTH MAINTENANCE LETTER (OUTPATIENT)
Age: 49
End: 2019-09-28

## 2020-01-24 ENCOUNTER — OFFICE VISIT (OUTPATIENT)
Dept: FAMILY MEDICINE | Facility: CLINIC | Age: 50
End: 2020-01-24
Payer: COMMERCIAL

## 2020-01-24 VITALS
DIASTOLIC BLOOD PRESSURE: 80 MMHG | WEIGHT: 238.3 LBS | HEIGHT: 74 IN | HEART RATE: 62 BPM | TEMPERATURE: 97.4 F | SYSTOLIC BLOOD PRESSURE: 124 MMHG | BODY MASS INDEX: 30.58 KG/M2 | RESPIRATION RATE: 16 BRPM

## 2020-01-24 DIAGNOSIS — R51.9 SCALP PAIN: ICD-10-CM

## 2020-01-24 PROCEDURE — 99214 OFFICE O/P EST MOD 30 MIN: CPT | Performed by: FAMILY MEDICINE

## 2020-01-24 RX ORDER — METHYLPREDNISOLONE 4 MG
TABLET, DOSE PACK ORAL
Qty: 21 TABLET | Refills: 0 | Status: SHIPPED | OUTPATIENT
Start: 2020-01-24 | End: 2021-01-14

## 2020-01-24 ASSESSMENT — MIFFLIN-ST. JEOR: SCORE: 2005.67

## 2020-01-24 NOTE — PROGRESS NOTES
Subjective     Carlos Keyes is a 49 year old male who presents to clinic today for the following health issues:    HPI   Patient has been having left sided head tenderness. Has been present for 1 week. No injury to the area. Patient states that this has happened in the past and it usually goes away on it's own.     SUBJECTIVE:  Carlos  is a 49 year old male who presents for: Scalp pain in the left parietal area.  Been going on for a week.  Not getting worse but not going away.  Sometimes he does not notice it but if he touches the area it is tender.  No deep headache.  No injury.  No rashes.  Not recently ill.  No visual disturbances.  No nausea.  He has chronic tinnitus.  Does not change in intensity with position.    Past Medical History:   Diagnosis Date     Toe pain 1/29/2009     Past Surgical History:   Procedure Laterality Date     ARTHROSCOPY KNEE WITH DEBRIDEMENT JOINT, COMBINED Right 7/25/2018    Procedure: ARTHROSCOPY KNEE WITH DEBRIDEMENT JOINT;  Right knee arthroscopy with medial meniscus debridement;  Surgeon: Seth Basilio MD;  Location: [facility]     COLONOSCOPY N/A 7/16/2019    Procedure: COLONOSCOPY;  Surgeon: Tyler Miles DO;  Location: [facility]     FRACTURE TX, ANKLE RT/LT      Fracture TX Ankle RT/LT     HERNIA REPAIR, INGUINAL RT/LT  10/13/2006    Laparoscopic left inguinal hernia repair.     OPEN REDUCTION INTERNAL FIXATION FOREARM Right 11/5/2014    Procedure: OPEN REDUCTION INTERNAL FIXATION FOREARM;  Surgeon: Seth Basilio MD;  Location: [facility]     REPAIR HAMMER TOE  10/14/2011    Procedure:REPAIR HAMMER TOE; Correction Toes 4th,5th Toe Right Foot; Surgeon:ASIF CRUM; Location:[facility]     VASECTOMY Bilateral 05/06/05     Social History     Tobacco Use     Smoking status: Never Smoker     Smokeless tobacco: Former User     Types: Chew   Substance Use Topics     Alcohol use: Yes     Comment: daily 2 drinks     Current Outpatient Medications   Medication Sig Dispense  "Refill     allopurinol (ZYLOPRIM) 100 MG tablet Take 1 tablet (100 mg) by mouth daily 90 tablet 3     methylPREDNISolone (MEDROL DOSEPAK) 4 MG tablet therapy pack Follow Package Directions 21 tablet 0     simvastatin (ZOCOR) 20 MG tablet Take 1 tablet (20 mg) by mouth At Bedtime If tolerated increase by 1 tab weekly until taking 80mg daily 90 tablet 3     indomethacin (INDOCIN) 50 MG capsule Take 1 capsule (50 mg) by mouth 2 times daily (with meals) (Patient not taking: Reported on 1/24/2020) 90 capsule 0       REVIEW OF SYSTEMS:   5 point ROS negative except as noted above in HPI, including Gen., Resp, CV, GI &  system review.     OBJECTIVE:  Vitals: /80 (BP Location: Left arm, Patient Position: Sitting, Cuff Size: Adult Large)   Pulse 62   Temp 97.4  F (36.3  C) (Temporal)   Resp 16   Ht 1.88 m (6' 2\")   Wt 108.1 kg (238 lb 4.8 oz)   BMI 30.60 kg/m    BMI= Body mass index is 30.6 kg/m .  He is alert appears well.  Vital signs are fine.  Head is normocephalic.  His head is shaved.  There is no deformity, swelling, redness or rash in the area described.  No fluctuance noted on palpation.  No real tenderness noted on palpation.  Full range of motion of the neck does not reproduce tenderness.  Eyes PERRLA EOMs full.  Visual fields are intact.  Ears are normal-appearing.  Face is symmetrical.  Speech is regular.  Gait is regular.    ASSESSMENT:  Scalp pain    PLAN:  Told him I do not really have an explanation for this perhaps some muscle spasm or vessel inflammation in the area.  He is to watch for development of a rash which would signal perhaps zoster.  Going to just empirically try him on a Medrol Dosepak to cover inflammatory component.  If continues to bother him or certainly if worsens would need to consider some imaging.        Ivan Delaney MD  [facility]            "

## 2020-04-10 ENCOUNTER — TELEPHONE (OUTPATIENT)
Dept: FAMILY MEDICINE | Facility: CLINIC | Age: 50
End: 2020-04-10

## 2020-04-10 DIAGNOSIS — M25.531 RIGHT WRIST PAIN: Primary | ICD-10-CM
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
## 2020-04-13 ENCOUNTER — HOSPITAL ENCOUNTER (OUTPATIENT)
Dept: GENERAL RADIOLOGY | Facility: CLINIC | Age: 50
End: 2020-04-13
Attending: FAMILY MEDICINE
Payer: COMMERCIAL

## 2020-04-13 DIAGNOSIS — M25.531 RIGHT WRIST PAIN: ICD-10-CM

## 2020-04-13 PROCEDURE — 73110 X-RAY EXAM OF WRIST: CPT | Mod: TC

## 2020-04-14 NOTE — RESULT ENCOUNTER NOTE
Carlos, your xray looks negative. I'm going to speak to the radiologist tomorrow and call you then.   Karena Jiang MD

## 2020-08-11 ENCOUNTER — TELEPHONE (OUTPATIENT)
Dept: FAMILY MEDICINE | Facility: CLINIC | Age: 50
End: 2020-08-11

## 2020-08-11 DIAGNOSIS — Z12.5 SCREENING FOR PROSTATE CANCER: Primary | ICD-10-CM

## 2020-08-11 NOTE — TELEPHONE ENCOUNTER
If you agree sign order and then route back to pool so someone can get him an appointment for the shingrix shot and a lab appointment.

## 2020-08-11 NOTE — TELEPHONE ENCOUNTER
Carlos called back and message was given to him. Started scheduling a phone visit for him and he decided he would talk to the physician that suggested the PSA before scheduling.

## 2020-08-11 NOTE — TELEPHONE ENCOUNTER
We only offer men with urinary problems (delayed stream, weak stream, blood in urine) OR men with family history of prostate cancer a PSA test. Otherwise we advise against it, because its a fairly inaccurate test. But if he still wants it, we should have a visit or phone vist to discuss. Theres been a change in the recommendations for prostate screening

## 2020-08-11 NOTE — TELEPHONE ENCOUNTER
Called and LM for patient to call back. Please relay message below to patient.   Vesta Del Rosario MA

## 2020-08-11 NOTE — TELEPHONE ENCOUNTER
"Reason for Call: Request for an order or referral:    Order or referral being requested: PSA lab    Date needed: as soon as possible    Has the patient been seen by the PCP for this problem?     Additional comments: Carlos had his \"Flight Physical\" and that provider told him he shoudl have a PSA test and shingrix shot.  Needs to be ordered by PCP    Phone number Patient can be reached at:  Home number on file [phone] (home)    Best Time:  any    Can we leave a detailed message on this number?  YES    Call taken on 8/11/2020 at 9:43 AM by Teri Govea    "

## 2020-08-17 ENCOUNTER — ALLIED HEALTH/NURSE VISIT (OUTPATIENT)
Dept: FAMILY MEDICINE | Facility: CLINIC | Age: 50
End: 2020-08-17
Payer: COMMERCIAL

## 2020-08-17 DIAGNOSIS — Z23 NEED FOR VIRAL IMMUNIZATION: Primary | ICD-10-CM

## 2020-08-17 PROCEDURE — 99207 ZZC NO CHARGE NURSE ONLY: CPT

## 2020-08-17 PROCEDURE — 90471 IMMUNIZATION ADMIN: CPT

## 2020-08-17 PROCEDURE — 90750 HZV VACC RECOMBINANT IM: CPT

## 2020-08-17 NOTE — NURSING NOTE
Prior to immunization administration, verified patients identity using patient s name and date of birth. Please see Immunization Activity for additional information.     Screening Questionnaire for Adult Immunization    Are you sick today?   No   Do you have allergies to medications, food, a vaccine component or latex?   No   Have you ever had a serious reaction after receiving a vaccination?   No   Do you have a long-term health problem with heart, lung, kidney, or metabolic disease (e.g., diabetes), asthma, a blood disorder, no spleen, complement component deficiency, a cochlear implant, or a spinal fluid leak?  Are you on long-term aspirin therapy?   No   Do you have cancer, leukemia, HIV/AIDS, or any other immune system problem?   No   Do you have a parent, brother, or sister with an immune system problem?   No   In the past 3 months, have you taken medications that affect  your immune system, such as prednisone, other steroids, or anticancer drugs; drugs for the treatment of rheumatoid arthritis, Crohn s disease, or psoriasis; or have you had radiation treatments?   No   Have you had a seizure, or a brain or other nervous system problem?   No   During the past year, have you received a transfusion of blood or blood    products, or been given immune (gamma) globulin or antiviral drug?   No   For women: Are you pregnant or is there a chance you could become       pregnant during the next month?   No   Have you received any vaccinations in the past 4 weeks?   No     Immunization questionnaire answers were all negative.        Per orders of Dr. Marks, injection of Shingrix given by Molly Tristan LPN. Patient instructed to remain in clinic for 15 minutes afterwards, and to report any adverse reaction to me immediately.      Prior to injection verified patient identity using patient's name and date of birth.  Per orders from the provider, injection(s) given by Rachid Tristan LPN. Patient instructed to remain in  clinic for 15 minutes afterwards, and to report any adverse reaction to me immediately. VIS given.  ................Rachid Tristan LPN,   August 17, 2020,      11:32 AM,   [facility]

## 2020-08-20 ENCOUNTER — MYC MEDICAL ADVICE (OUTPATIENT)
Dept: FAMILY MEDICINE | Facility: CLINIC | Age: 50
End: 2020-08-20

## 2020-10-31 ENCOUNTER — TELEPHONE (OUTPATIENT)
Dept: FAMILY MEDICINE | Facility: CLINIC | Age: 50
End: 2020-10-31

## 2020-10-31 DIAGNOSIS — M10.00 ACUTE IDIOPATHIC GOUT, UNSPECIFIED SITE: ICD-10-CM

## 2020-11-02 NOTE — TELEPHONE ENCOUNTER
Routing refill request to provider for review/approval because:  Labs not current:  CBC, ALT, Uric Acid, Creatinine.     Rosanna Bustamante RN

## 2020-11-04 RX ORDER — ALLOPURINOL 100 MG/1
TABLET ORAL
Qty: 90 TABLET | Refills: 3 | Status: SHIPPED | OUTPATIENT
Start: 2020-11-04 | End: 2021-01-14

## 2020-12-11 DIAGNOSIS — E78.5 HYPERLIPIDEMIA LDL GOAL <130: ICD-10-CM

## 2020-12-14 NOTE — TELEPHONE ENCOUNTER
Routing refill request to provider for review/approval because:  Labs not current:  LDL    ABE PhillipsN, RN  [facility]

## 2020-12-15 ENCOUNTER — MYC MEDICAL ADVICE (OUTPATIENT)
Dept: FAMILY MEDICINE | Facility: CLINIC | Age: 50
End: 2020-12-15

## 2020-12-15 DIAGNOSIS — E78.5 HYPERLIPIDEMIA LDL GOAL <130: ICD-10-CM

## 2020-12-15 RX ORDER — SIMVASTATIN 20 MG
TABLET ORAL
Qty: 90 TABLET | Refills: 0 | Status: SHIPPED | OUTPATIENT
Start: 2020-12-15 | End: 2021-01-14

## 2020-12-15 RX ORDER — SIMVASTATIN 20 MG
20 TABLET ORAL AT BEDTIME
Qty: 30 TABLET | Refills: 0 | Status: SHIPPED | OUTPATIENT
Start: 2020-12-15 | End: 2021-01-14

## 2020-12-17 ENCOUNTER — MYC MEDICAL ADVICE (OUTPATIENT)
Dept: FAMILY MEDICINE | Facility: CLINIC | Age: 50
End: 2020-12-17

## 2020-12-18 NOTE — TELEPHONE ENCOUNTER
Carlos Keyes [employer] Messages   Phone Number: [phone]             Good Morning!  Are you getting my messages & reply?  I would like to get an appt for labs and could I get my question answered regarding the medication dosage increase please?     Thanks!

## 2021-01-10 ENCOUNTER — HEALTH MAINTENANCE LETTER (OUTPATIENT)
Age: 51
End: 2021-01-10

## 2021-01-11 ASSESSMENT — ENCOUNTER SYMPTOMS
SORE THROAT: 0
CHILLS: 0
DYSURIA: 0
MYALGIAS: 0
ARTHRALGIAS: 0
JOINT SWELLING: 0
NERVOUS/ANXIOUS: 0
ABDOMINAL PAIN: 0
DIARRHEA: 0
HEADACHES: 0
WEAKNESS: 0
SHORTNESS OF BREATH: 0
FREQUENCY: 0
EYE PAIN: 0
PARESTHESIAS: 0
HEARTBURN: 0
HEMATOCHEZIA: 0
CONSTIPATION: 0
PALPITATIONS: 0
FEVER: 0
HEMATURIA: 0
NAUSEA: 0
DIZZINESS: 0

## 2021-01-14 ENCOUNTER — OFFICE VISIT (OUTPATIENT)
Dept: FAMILY MEDICINE | Facility: CLINIC | Age: 51
End: 2021-01-14
Payer: COMMERCIAL

## 2021-01-14 VITALS
BODY MASS INDEX: 29.47 KG/M2 | HEIGHT: 76 IN | SYSTOLIC BLOOD PRESSURE: 120 MMHG | TEMPERATURE: 96 F | RESPIRATION RATE: 18 BRPM | DIASTOLIC BLOOD PRESSURE: 80 MMHG | WEIGHT: 242 LBS | OXYGEN SATURATION: 100 % | HEART RATE: 100 BPM

## 2021-01-14 DIAGNOSIS — R39.12 WEAK URINARY STREAM: ICD-10-CM

## 2021-01-14 DIAGNOSIS — Z00.00 ROUTINE GENERAL MEDICAL EXAMINATION AT A HEALTH CARE FACILITY: Primary | ICD-10-CM

## 2021-01-14 DIAGNOSIS — M10.00 ACUTE IDIOPATHIC GOUT, UNSPECIFIED SITE: ICD-10-CM

## 2021-01-14 DIAGNOSIS — E78.5 HYPERLIPIDEMIA LDL GOAL <130: ICD-10-CM

## 2021-01-14 LAB
ANION GAP SERPL CALCULATED.3IONS-SCNC: 2 MMOL/L (ref 3–14)
BUN SERPL-MCNC: 17 MG/DL (ref 7–30)
CALCIUM SERPL-MCNC: 9.2 MG/DL (ref 8.5–10.1)
CHLORIDE SERPL-SCNC: 105 MMOL/L (ref 94–109)
CHOLEST SERPL-MCNC: 177 MG/DL
CO2 SERPL-SCNC: 31 MMOL/L (ref 20–32)
CREAT SERPL-MCNC: 0.87 MG/DL (ref 0.66–1.25)
GFR SERPL CREATININE-BSD FRML MDRD: >90 ML/MIN/{1.73_M2}
GLUCOSE SERPL-MCNC: 99 MG/DL (ref 70–99)
HDLC SERPL-MCNC: 71 MG/DL
LDLC SERPL CALC-MCNC: 69 MG/DL
NONHDLC SERPL-MCNC: 106 MG/DL
POTASSIUM SERPL-SCNC: 4.1 MMOL/L (ref 3.4–5.3)
PSA SERPL-ACNC: 0.91 UG/L (ref 0–4)
SODIUM SERPL-SCNC: 138 MMOL/L (ref 133–144)
TRIGL SERPL-MCNC: 186 MG/DL
URATE SERPL-MCNC: 5.5 MG/DL (ref 3.5–7.2)

## 2021-01-14 PROCEDURE — 80061 LIPID PANEL: CPT | Performed by: FAMILY MEDICINE

## 2021-01-14 PROCEDURE — G0103 PSA SCREENING: HCPCS | Performed by: FAMILY MEDICINE

## 2021-01-14 PROCEDURE — 99213 OFFICE O/P EST LOW 20 MIN: CPT | Mod: 25 | Performed by: FAMILY MEDICINE

## 2021-01-14 PROCEDURE — 84550 ASSAY OF BLOOD/URIC ACID: CPT | Performed by: FAMILY MEDICINE

## 2021-01-14 PROCEDURE — 36415 COLL VENOUS BLD VENIPUNCTURE: CPT | Performed by: FAMILY MEDICINE

## 2021-01-14 PROCEDURE — 99396 PREV VISIT EST AGE 40-64: CPT | Performed by: FAMILY MEDICINE

## 2021-01-14 PROCEDURE — 80048 BASIC METABOLIC PNL TOTAL CA: CPT | Performed by: FAMILY MEDICINE

## 2021-01-14 RX ORDER — ALLOPURINOL 100 MG/1
100 TABLET ORAL DAILY
Qty: 90 TABLET | Refills: 3 | Status: SHIPPED | OUTPATIENT
Start: 2021-01-14 | End: 2022-02-15

## 2021-01-14 RX ORDER — INDOMETHACIN 50 MG/1
50 CAPSULE ORAL 2 TIMES DAILY WITH MEALS
Qty: 90 CAPSULE | Refills: 0 | Status: SHIPPED | OUTPATIENT
Start: 2021-01-14 | End: 2022-06-06

## 2021-01-14 RX ORDER — SIMVASTATIN 20 MG
20 TABLET ORAL AT BEDTIME
Qty: 90 TABLET | Refills: 1 | Status: SHIPPED | OUTPATIENT
Start: 2021-01-14 | End: 2021-08-30

## 2021-01-14 ASSESSMENT — ENCOUNTER SYMPTOMS
WEAKNESS: 0
FEVER: 0
EYE PAIN: 0
JOINT SWELLING: 0
MYALGIAS: 0
ABDOMINAL PAIN: 0
DIZZINESS: 0
HEARTBURN: 0
DYSURIA: 0
NAUSEA: 0
SORE THROAT: 0
HEADACHES: 0
SHORTNESS OF BREATH: 0
NERVOUS/ANXIOUS: 0
FREQUENCY: 0
ARTHRALGIAS: 0
DIARRHEA: 0
CHILLS: 0
HEMATURIA: 0
PALPITATIONS: 0
CONSTIPATION: 0
PARESTHESIAS: 0
HEMATOCHEZIA: 0

## 2021-01-14 ASSESSMENT — PAIN SCALES - GENERAL: PAINLEVEL: NO PAIN (0)

## 2021-01-14 ASSESSMENT — MIFFLIN-ST. JEOR: SCORE: 2041.26

## 2021-01-14 NOTE — PROGRESS NOTES
SUBJECTIVE:   CC: Carlos Keyes is an 50 year old male who presents for preventative health visit.       Patient has been advised of split billing requirements and indicates understanding: Yes  Healthy Habits:     Getting at least 3 servings of Calcium per day:  Yes    Bi-annual eye exam:  Yes    Dental care twice a year:  Yes    Sleep apnea or symptoms of sleep apnea:  None    Diet:  Regular (no restrictions)    Frequency of exercise:  2-3 days/week    Duration of exercise:  Less than 15 minutes    Taking medications regularly:  Yes    Medication side effects:  Not applicable    PHQ-2 Total Score: 0    Additional concerns today:  No    No further gout flairs in the last year      Today's PHQ-2 Score:   PHQ-2 ( 1999 Pf[employer]) 1/11/2021   Q1: Little interest or pleasure in doing things 0   Q2: Feeling down, depressed or hopeless 0   PHQ-2 Score 0   Q1: Little interest or pleasure in doing things Not at all   Q2: Feeling down, depressed or hopeless Not at all   PHQ-2 Score 0       Abuse: Current or Past(Physical, Sexual or Emotional)- No  Do you feel safe in your environment? Yes        Social History     Tobacco Use     Smoking status: Never Smoker     Smokeless tobacco: Former User     Types: Chew   Substance Use Topics     Alcohol use: Yes     Comment: daily 2 drinks     If you drink alcohol do you typically have >3 drinks per day or >7 drinks per week? No      AUDIT - Alcohol Use Disorders Identification Test - Reproduced from the World Health Organization Audit 2001 (Second Edition) 1/11/2021   1.  How often do you have a drink containing alcohol? 2 to 3 times a week   2.  How many drinks containing alcohol do you have on a typical day when you are drinking? 3 or 4   3.  How often do you have five or more drinks on one occasion? Monthly   4.  How often during the last year have you found that you were not able to stop drinking once you had started? Never   5.  How often during the last year have you failed to do  what was normally expected of you because of drinking? Never   6.  How often during the last year have you needed a first drink in the morning to get yourself going after a heavy drinking session? Never   7.  How often during the last year have you had a feeling of guilt or remorse after drinking? Never   8.  How often during the last year have you been unable to remember what happened the night before because of your drinking? Never   9.  Have you or someone else been injured because of your drinking? No   10. Has a relative, friend, doctor or other health care worker been concerned about your drinking or suggested you cut down? No   TOTAL SCORE 6       Last PSA:   PSA   Date Value Ref Range Status   01/14/2021 0.91 0 - 4 ug/L Final     Comment:     Assay Method:  Chemiluminescence using Siemens Vista analyzer       Reviewed orders with patient. Reviewed health maintenance and updated orders accordingly - Yes      Reviewed and updated as needed this visit by clinical staff  Tobacco  Allergies  Meds              Reviewed and updated as needed this visit by Provider                    Review of Systems   Constitutional: Negative for chills and fever.   HENT: Negative for congestion, ear pain, hearing loss and sore throat.    Eyes: Negative for pain and visual disturbance.   Respiratory: Negative for shortness of breath.    Cardiovascular: Negative for chest pain, palpitations and peripheral edema.   Gastrointestinal: Negative for abdominal pain, constipation, diarrhea, heartburn, hematochezia and nausea.   Genitourinary: Negative for discharge, dysuria, frequency, genital sores, hematuria, impotence and urgency.   Musculoskeletal: Negative for arthralgias, joint swelling and myalgias.   Skin: Negative for rash.   Neurological: Negative for dizziness, weakness, headaches and paresthesias.   Psychiatric/Behavioral: Negative for mood changes. The patient is not nervous/anxious.          OBJECTIVE:   /80    "Pulse 100   Temp 96  F (35.6  C) (Temporal)   Resp 18   Ht 1.918 m (6' 3.5\")   Wt 109.8 kg (242 lb)   SpO2 100%   BMI 29.85 kg/m      Physical Exam  GENERAL: healthy, alert and no distress  NECK: no adenopathy, no asymmetry, masses, or scars and thyroid normal to palpation  RESP: lungs clear to auscultation - no rales, rhonchi or wheezes  CV: regular rate and rhythm, normal S1 S2, no S3 or S4, no murmur, click or rub, no peripheral edema and peripheral pulses strong  ABDOMEN: soft, nontender, no hepatosplenomegaly, no masses and bowel sounds normal  MS: no gross musculoskeletal defects noted, no edema  Skin-no suspicious lesions    Diagnostic Test Results:  Labs reviewed in Epic    ASSESSMENT/PLAN:       ICD-10-CM    1. Routine general medical examination at a health care facility  Z00.00 Lipid panel reflex to direct LDL Fasting   2. Acute idiopathic gout, unspecified site  M10.00 allopurinol (ZYLOPRIM) 100 MG tablet     indomethacin (INDOCIN) 50 MG capsule     Uric acid     Basic metabolic panel   3. Hyperlipidemia LDL goal <130  E78.5 simvastatin (ZOCOR) 20 MG tablet   4. Weak urinary stream  R39.12 PSA, screen     He is noted a stable but weak urinary stream over the past several years.  PSA has been routinely been done and, we had a long discussion about the risks and benefits associated with prostate screening with a PSA.  He elected to have blood draw and a prostate exam which I think is appropriate for him.  Exam was enlarged but no nodules.  And awaiting the PSA to come back.  His gout has been controlled with diet and allopurinol.  No changes.  Hyperlipidemia, he is back on his simvastatin this was reordered  Annual topics covered including appropriate cancer screening, immunizations, cardiovascular health    Patient has been advised of split billing requirements and indicates understanding: Yes  COUNSELING:   Reviewed preventive health counseling, as reflected in patient instructions    Estimated " "body mass index is 29.85 kg/m  as calculated from the following:    Height as of this encounter: 1.918 m (6' 3.5\").    Weight as of this encounter: 109.8 kg (242 lb).         He reports that he has never smoked. He has quit using smokeless tobacco.  His smokeless tobacco use included chew.      Counseling Resources:  ATP IV Guidelines  Pooled Cohorts Equation Calculator  FRAX Risk Assessment  ICSI Preventive Guidelines  Dietary Guidelines for Americans, 2010  USDA's MyPlate  ASA Prophylaxis  Lung CA Screening    Darek Marks MD  [facility]  "

## 2021-01-16 ENCOUNTER — MYC MEDICAL ADVICE (OUTPATIENT)
Dept: FAMILY MEDICINE | Facility: CLINIC | Age: 51
End: 2021-01-16

## 2021-02-12 ENCOUNTER — MYC MEDICAL ADVICE (OUTPATIENT)
Dept: FAMILY MEDICINE | Facility: CLINIC | Age: 51
End: 2021-02-12

## 2021-02-12 DIAGNOSIS — E78.5 HYPERLIPIDEMIA LDL GOAL <130: ICD-10-CM

## 2021-02-12 RX ORDER — SIMVASTATIN 20 MG
TABLET ORAL
Qty: 90 TABLET | Refills: 0 | OUTPATIENT
Start: 2021-02-12

## 2021-02-12 NOTE — TELEPHONE ENCOUNTER
See previous My Chart. Pt has refills on file. Pt aware. Will close encounter.  Mel Snow, RN, BSN

## 2021-08-04 ENCOUNTER — ALLIED HEALTH/NURSE VISIT (OUTPATIENT)
Dept: FAMILY MEDICINE | Facility: CLINIC | Age: 51
End: 2021-08-04
Payer: COMMERCIAL

## 2021-08-04 ENCOUNTER — TELEPHONE (OUTPATIENT)
Dept: FAMILY MEDICINE | Facility: CLINIC | Age: 51
End: 2021-08-04

## 2021-08-04 DIAGNOSIS — R19.7 DIARRHEA OF PRESUMED INFECTIOUS ORIGIN: ICD-10-CM

## 2021-08-04 DIAGNOSIS — R19.7 DIARRHEA OF PRESUMED INFECTIOUS ORIGIN: Primary | ICD-10-CM

## 2021-08-04 PROCEDURE — 99207 PR NO CHARGE NURSE ONLY: CPT

## 2021-08-04 PROCEDURE — U0005 INFEC AGEN DETEC AMPLI PROBE: HCPCS

## 2021-08-04 PROCEDURE — 87506 IADNA-DNA/RNA PROBE TQ 6-11: CPT | Performed by: FAMILY MEDICINE

## 2021-08-04 PROCEDURE — 87177 OVA AND PARASITES SMEARS: CPT | Performed by: FAMILY MEDICINE

## 2021-08-04 PROCEDURE — 87329 GIARDIA AG IA: CPT | Performed by: FAMILY MEDICINE

## 2021-08-04 PROCEDURE — U0003 INFECTIOUS AGENT DETECTION BY NUCLEIC ACID (DNA OR RNA); SEVERE ACUTE RESPIRATORY SYNDROME CORONAVIRUS 2 (SARS-COV-2) (CORONAVIRUS DISEASE [COVID-19]), AMPLIFIED PROBE TECHNIQUE, MAKING USE OF HIGH THROUGHPUT TECHNOLOGIES AS DESCRIBED BY CMS-2020-01-R: HCPCS

## 2021-08-04 NOTE — TELEPHONE ENCOUNTER
Try complains of the onset of diarrhea on Friday.  He had very we discussed OTC diarrhea medications he can use including Pepto-Bismol and Imodium.  Mild symptoms x1 on Tuesday of last week but then since Friday has been pretty severe with several episodes per day.  He has crampy abdominal pain.  Now today he noted a little bit of blood x2.  No known exposures.  No ill contacts.  He is not Covid vaccinated.  No fever.  No upper respiratory symptoms.  I advised him to get a Covid swab and to get stool studies done.  See orders pending.  We discussed OTC diarrhea medications including Pepto-Bismol and Imodium.  Karena Jiang MD

## 2021-08-05 LAB
C COLI+JEJUNI+LARI FUSA STL QL NAA+PROBE: NOT DETECTED
C PARVUM AG STL QL IA: NEGATIVE
EC STX1 GENE STL QL NAA+PROBE: NOT DETECTED
EC STX2 GENE STL QL NAA+PROBE: NOT DETECTED
G LAMBLIA AG STL QL IA: NEGATIVE
NOROV GI+II ORF1-ORF2 JNC STL QL NAA+PR: NOT DETECTED
O+P STL MICRO: NEGATIVE
RVA NSP5 STL QL NAA+PROBE: NOT DETECTED
SALMONELLA SP RPOD STL QL NAA+PROBE: NOT DETECTED
SARS-COV-2 RNA RESP QL NAA+PROBE: NEGATIVE
SHIGELLA SP+EIEC IPAH STL QL NAA+PROBE: NOT DETECTED
V CHOL+PARA RFBL+TRKH+TNAA STL QL NAA+PR: NOT DETECTED
Y ENTERO RECN STL QL NAA+PROBE: NOT DETECTED

## 2021-08-29 DIAGNOSIS — E78.5 HYPERLIPIDEMIA LDL GOAL <130: ICD-10-CM

## 2021-08-30 RX ORDER — SIMVASTATIN 20 MG
TABLET ORAL
Qty: 90 TABLET | Refills: 1 | Status: SHIPPED | OUTPATIENT
Start: 2021-08-30 | End: 2022-03-07

## 2021-09-10 ENCOUNTER — TELEPHONE (OUTPATIENT)
Dept: FAMILY MEDICINE | Facility: CLINIC | Age: 51
End: 2021-09-10

## 2021-09-10 DIAGNOSIS — Z20.822 ENCOUNTER FOR LABORATORY TESTING FOR COVID-19 VIRUS: Primary | ICD-10-CM

## 2021-09-10 NOTE — TELEPHONE ENCOUNTER
Try will be traveling to [place] and needs a Covid test before [place].  See orders.  Karena Jiang MD

## 2021-10-23 ENCOUNTER — HEALTH MAINTENANCE LETTER (OUTPATIENT)
Age: 51
End: 2021-10-23

## 2021-11-23 NOTE — TELEPHONE ENCOUNTER
Prescription approved per [facility] Refill Protocol.  Gemma Mendieta RN     Pt calling to check on the status of this medication refill. States he is leaving for [place] tomorrow morning so would appreciate if he can get this today. Please advise.

## 2022-02-13 DIAGNOSIS — M10.00 ACUTE IDIOPATHIC GOUT, UNSPECIFIED SITE: ICD-10-CM

## 2022-02-15 RX ORDER — ALLOPURINOL 100 MG/1
TABLET ORAL
Qty: 90 TABLET | Refills: 3 | Status: SHIPPED | OUTPATIENT
Start: 2022-02-15 | End: 2022-02-16

## 2022-02-15 NOTE — TELEPHONE ENCOUNTER
Pending Prescriptions:                       Disp   Refills    allopurinol (ZYLOPRIM) 100 MG tablet [Phar*90 tab*3        Sig: TAKE ONE TABLET BY MOUTH ONCE DAILY    Routing refill request to provider for review/approval because:  Labs not current:  CBC, ALTt, Uric Acid, CR  Patient needs to be seen because it has been more than 1 year since last office visit.    Sending to scheduling for yearly office visit due    Megan Wells RN

## 2022-02-16 ENCOUNTER — OFFICE VISIT (OUTPATIENT)
Dept: FAMILY MEDICINE | Facility: CLINIC | Age: 52
End: 2022-02-16
Payer: COMMERCIAL

## 2022-02-16 ENCOUNTER — HOSPITAL ENCOUNTER (OUTPATIENT)
Dept: GENERAL RADIOLOGY | Facility: CLINIC | Age: 52
Discharge: HOME | End: 2022-02-16
Attending: FAMILY MEDICINE | Admitting: FAMILY MEDICINE
Payer: COMMERCIAL

## 2022-02-16 VITALS
WEIGHT: 247 LBS | BODY MASS INDEX: 30.47 KG/M2 | RESPIRATION RATE: 10 BRPM | HEART RATE: 69 BPM | DIASTOLIC BLOOD PRESSURE: 84 MMHG | OXYGEN SATURATION: 99 % | SYSTOLIC BLOOD PRESSURE: 134 MMHG | TEMPERATURE: 97.8 F

## 2022-02-16 DIAGNOSIS — M10.00 ACUTE IDIOPATHIC GOUT, UNSPECIFIED SITE: ICD-10-CM

## 2022-02-16 DIAGNOSIS — Z13.6 SCREENING, ISCHEMIC HEART DISEASE: ICD-10-CM

## 2022-02-16 DIAGNOSIS — Z11.59 NEED FOR HEPATITIS C SCREENING TEST: ICD-10-CM

## 2022-02-16 DIAGNOSIS — M79.645 PAIN OF LEFT THUMB: Primary | ICD-10-CM

## 2022-02-16 PROCEDURE — 90471 IMMUNIZATION ADMIN: CPT | Performed by: FAMILY MEDICINE

## 2022-02-16 PROCEDURE — 73140 X-RAY EXAM OF FINGER(S): CPT | Mod: LT

## 2022-02-16 PROCEDURE — 99214 OFFICE O/P EST MOD 30 MIN: CPT | Mod: 25 | Performed by: FAMILY MEDICINE

## 2022-02-16 PROCEDURE — 90750 HZV VACC RECOMBINANT IM: CPT | Performed by: FAMILY MEDICINE

## 2022-02-16 PROCEDURE — 90472 IMMUNIZATION ADMIN EACH ADD: CPT | Performed by: FAMILY MEDICINE

## 2022-02-16 PROCEDURE — 90715 TDAP VACCINE 7 YRS/> IM: CPT | Performed by: FAMILY MEDICINE

## 2022-02-16 RX ORDER — RNA INGREDIENT BNT-162B2 0.23 G/1.8ML
INJECTION, SUSPENSION INTRAMUSCULAR
COMMUNITY
Start: 2021-08-11 | End: 2023-12-15

## 2022-02-16 RX ORDER — ALLOPURINOL 100 MG/1
100 TABLET ORAL DAILY
Qty: 90 TABLET | Refills: 0 | Status: SHIPPED | OUTPATIENT
Start: 2022-02-16 | End: 2022-09-09

## 2022-02-16 NOTE — PROGRESS NOTES
Assessment & Plan       ICD-10-CM    1. Pain of left thumb  M79.645    2. Acute idiopathic gout, unspecified site  M10.00 Basic metabolic panel  (Ca, Cl, CO2, Creat, Gluc, K, Na, BUN)     Uric acid     XR Finger Left G/E 2 Views     allopurinol (ZYLOPRIM) 100 MG tablet   3. Screening, ischemic heart disease  Z13.6 Lipid panel reflex to direct LDL Fasting   4. Need for hepatitis C screening test  Z11.59 Hepatitis C Screen Reflex to HCV RNA Quant and Genotype     His symptoms and physical exam today are suggestive of  acute gout flare up.  Also likely he has chronic gout at the the left first MCP joint with trophus.  Unlikely infection of the joint.  No fever.  Continue with allopurinol even with the  normal uric acid level.  Labs as ordered.  Continue with Indocin as needed.  Also recommended to continue with his normal activities as tolerated.  Discussed with him briefly about food to avoided.  Call in or follow-up as needed.    He was also recommended to follow-up with his doctor for physical at his earliest convenience.  Recommended flu and COVID booster vaccination today but he declined.  He received the shingle andTdap vaccination today.       Return in about 2 months (around 4/16/2022) for Physical Exam.    Narciso Mancia Mai, MD  [facility]    Lane Gonzalez is a 51 year old who presents for the following health issues     Musculoskeletal Problem    History of Present Illness     Reason for visit:  Pain, swelling & stiffness in left thumb.  Has been an ongoing intermittent issue for about a year or so.  Symptom onset:  1-3 days ago  Symptoms include:  Left thumb is sore, swelled up, stiff & is hot in the joint area  Symptom intensity:  Moderate  Symptom progression:  Staying the same  Had these symptoms before:  Yes  Has tried/received treatment for these symptoms:  No  What makes it worse:  Using the thumb  What makes it better:  Feels better in the morning    He eats 2-3 servings of  fruits and vegetables daily.He consumes 0 sweetened beverage(s) daily.He exercises with enough effort to increase his heart rate 10 to 19 minutes per day.  He exercises with enough effort to increase his heart rate 3 or less days per week.   He is taking medications regularly.       Concern - Thumb pain  Onset: Off and on, worse on 2/14/2022  Description: Swelling and pain, redness left thumb.  Intensity: mild, 3/10  Progression of Symptoms:  improving  Accompanying Signs & Symptoms: none  Previous history of similar problem: yes  Precipitating factors:        Worsened by: Flexing   Alleviating factors: 5Y       Improved by: Indomethicin  Therapies tried and outcome: ibuprofen.    Nursing notes above reviewed and confirmed with patient.  Been having the pain on and off for over a year, but it has gotten significantly worse in the last week.  It has been red, swollen, painful and very sensitive.  No unusual activities or trauma.  No fever or chills.  Pain is worse with any movement of the thumb.  Been treating for gout allopurinol for years.  Denied of excessive alcohol intake.  It is localized to the base of the left thumb.  No other concern.    Review of Systems   Constitutional, HEENT, cardiovascular, pulmonary, gi and gu systems are negative, except as otherwise noted.      Objective    /84   Pulse 69   Temp 97.8  F (36.6  C)   Resp 10   Wt 112 kg (247 lb)   SpO2 99%   BMI 30.47 kg/m    Body mass index is 30.47 kg/m .  Physical Exam   GENERAL: healthy, alert and no distress  RESP: lungs clear to auscultation - no rales, rhonchi or wheezes  CV: regular rate and rhythm, normal S1 S2, no S3 or S4, no murmur  MS: no gross musculoskeletal defects noted, no edema.  Both hands are equal in strength.  The base of the left thumb is swollen, hypertrophied, red and slightly warm to the touch.  It is sensitive and tender to the touch as well.    Results for orders placed or performed during the hospital encounter  of 02/16/22   XR Finger Left G/E 2 Views     Status: None    Narrative    EXAM: XR THUMB LEFT G/E 2 VIEWS  LOCATION: [facility]  DATE/TIME: 2/16/2022 5:14 PM    INDICATION: swollen, red, painful ?gout.  hx of gout  COMPARISON: None.      Impression    IMPRESSION: Normal joint spaces and alignment. No fracture. No erosions. Soft tissue swelling adjacent to the MCP joint.

## 2022-02-17 ENCOUNTER — LAB (OUTPATIENT)
Dept: LAB | Facility: CLINIC | Age: 52
End: 2022-02-17
Payer: COMMERCIAL

## 2022-02-17 ENCOUNTER — MYC MEDICAL ADVICE (OUTPATIENT)
Dept: FAMILY MEDICINE | Facility: CLINIC | Age: 52
End: 2022-02-17

## 2022-02-17 DIAGNOSIS — Z11.59 NEED FOR HEPATITIS C SCREENING TEST: ICD-10-CM

## 2022-02-17 DIAGNOSIS — M10.00 ACUTE IDIOPATHIC GOUT, UNSPECIFIED SITE: ICD-10-CM

## 2022-02-17 DIAGNOSIS — Z13.6 SCREENING, ISCHEMIC HEART DISEASE: ICD-10-CM

## 2022-02-17 LAB
ANION GAP SERPL CALCULATED.3IONS-SCNC: 5 MMOL/L (ref 3–14)
BUN SERPL-MCNC: 13 MG/DL (ref 7–30)
CALCIUM SERPL-MCNC: 9.3 MG/DL (ref 8.5–10.1)
CHLORIDE BLD-SCNC: 108 MMOL/L (ref 94–109)
CHOLEST SERPL-MCNC: 189 MG/DL
CO2 SERPL-SCNC: 28 MMOL/L (ref 20–32)
CREAT SERPL-MCNC: 0.87 MG/DL (ref 0.66–1.25)
FASTING STATUS PATIENT QL REPORTED: YES
GFR SERPL CREATININE-BSD FRML MDRD: >90 ML/MIN/1.73M2
GLUCOSE BLD-MCNC: 107 MG/DL (ref 70–99)
HCV AB SERPL QL IA: NONREACTIVE
HDLC SERPL-MCNC: 70 MG/DL
LDLC SERPL CALC-MCNC: 93 MG/DL
NONHDLC SERPL-MCNC: 119 MG/DL
POTASSIUM BLD-SCNC: 4.1 MMOL/L (ref 3.4–5.3)
SODIUM SERPL-SCNC: 141 MMOL/L (ref 133–144)
TRIGL SERPL-MCNC: 129 MG/DL
URATE SERPL-MCNC: 5.3 MG/DL (ref 3.5–7.2)

## 2022-02-17 PROCEDURE — 80061 LIPID PANEL: CPT

## 2022-02-17 PROCEDURE — 86803 HEPATITIS C AB TEST: CPT

## 2022-02-17 PROCEDURE — 80048 BASIC METABOLIC PNL TOTAL CA: CPT

## 2022-02-17 PROCEDURE — 84550 ASSAY OF BLOOD/URIC ACID: CPT

## 2022-02-17 PROCEDURE — 36415 COLL VENOUS BLD VENIPUNCTURE: CPT

## 2022-02-20 PROBLEM — R51.9 SCALP PAIN: Status: RESOLVED | Noted: 2020-01-24 | Resolved: 2022-02-20

## 2022-02-20 PROBLEM — S76.319S: Status: RESOLVED | Noted: 2018-08-07 | Resolved: 2022-02-20

## 2022-02-20 PROBLEM — S83.231A COMPLEX TEAR OF MEDIAL MENISCUS OF RIGHT KNEE AS CURRENT INJURY, INITIAL ENCOUNTER: Status: RESOLVED | Noted: 2018-08-07 | Resolved: 2022-02-20

## 2022-02-21 NOTE — PATIENT INSTRUCTIONS
Patient Education     Eating to Prevent Gout  Gout is a painful form of arthritis caused by an excess of uric acid. This is a waste product made by the body. It builds up in the body and forms crystals that collect in the joints, causing a gout attack. Alcohol and certain foods can trigger a gout attack. Below are some guidelines for changing your diet to help you manage gout. Your healthcare provider can work with you to determine the best eating plan for you. You can learn which foods affect your gout more than others. Reactions to different foods can vary from person to person. Know that diet is only one part of managing gout. Take your medicines as prescribed and follow the other guidelines your healthcare provider has given you.   Foods to limit  Eating too many foods containing purines may increase the levels of uric acid in your body and increase your risk for a gout attack. It may be best to limit these high-purine foods:     Alcohol (beer, hard liquor and red wine). You may be told to give up alcohol completely.    Certain fish (anchovies, sardines, fish roes, herring, tuna, mussels, codfish, scallops, trout, and laura)    Certain meats (red meat, processed meat, angel, turkey, wild game, and goose)    Sauces and gravies made with meat    Organ meats (such as liver, kidneys, sweetbreads, and tripe)    Legumes (such as dried beans and peas)    Mushrooms, spinach, asparagus, and cauliflower    Yeast and yeast extract supplements  Foods to try  Some foods may be helpful for people with gout. You may want to try adding some of the following foods to your diet:     Dark berries. These include blueberries, blackberries, and cherries. These berries contain chemicals that may lower uric acid.    Tofu. Tofu, which is made from soy, is a good source of protein. Studies have shown that it may be a better choice than meat for people with gout.    Omega fatty acids. These acids are found in fatty fish (such as  salmon), certain oils (such as flax, olive, or nut oils), or nuts. They may help prevent inflammation due to gout.  Gout guidelines  The following guidelines are recommended by the Academy of Nutrition and Dietetics for people with gout. Your diet should be:     High in fiber, whole grains, fruits, and vegetables    Low in protein. About 15% of calories should come from protein. Choose lean sources, such as soy, lean meats, and poultry without the skin.    Low in fat. No more than 30% of calories should come from fat, with only 10% coming from animal (saturated) fat.  [employer] last reviewed this educational content on 8/1/2020 2000-2021 [employer]. All rights reserved. This information is not intended as a substitute for professional medical care. Always follow your healthcare professional's instructions.        Patient Education     What Is Gout?  Gout is a disease that affects the joints. Left untreated, it can lead to painful foot and joint deformities and even kidney problems. But, by treating gout early, you can relieve pain and help prevent future problems. Gout can usually be treated with medicine and proper diet. In severe cases, surgery may be needed.  What causes gout?  Gout is caused by an excess of uric acid (a waste product made by the body). Uric acid is excreted by the kidneys. If the uric acid level in your blood rises too high, the uric acid may form crystals that collect in the joints, bringing on a gout attack. If you have many gout attacks, crystals may form large deposits called tophi. Tophi can damage joints and cause deformity.  Who is at risk for gout?  Men are more likely to have gout than women. But women can also be affected, mostly after menopause. Some health problems, such as obesity and high cholesterol, make gout more likely. And some medicines, such as diuretics ( water pills ), can trigger a gout attack. People who drink a lot of alcohol are at high risk for gout.  "Certain foods can also trigger a gout attack.  Substances that may trigger a gout attack  To help prevent a gout attack, avoid these foods:    Alcohol (particularly beer, but also red wine and spirits)    Certain meats (red meat, processed meat, turkey)    Organ meats (kidney, liver, sweetbread)    Shellfish (lobster, crab, shrimp, scallop, mussel)    Certain fish (anchovy, sardine, herring, mackerel)  Treatment    Lifestyle changes, including weight loss, exercise, and quitting tobacco use    Reducing consumption of the food groups above as well as high fructose corn syrup, found in many foods including sodas and energy drinks    Changing non-essential medicines that may contribute to gout (such as thiazide diuretics--\"water pills\")    Medicines to reduce the amount of uric acid in the blood, such as allopurinol, probencid, febuxostat, and lesinurad.    Medicines to treat acute gout attacks, including NSAIDs (nonsteroidal anti-inflammatory medicines), steroids, and colchicine    [employer] last reviewed this educational content on 4/1/2018 2000-2021 [employer]. All rights reserved. This information is not intended as a substitute for professional medical care. Always follow your healthcare professional's instructions.        "

## 2022-03-02 ENCOUNTER — LAB (OUTPATIENT)
Dept: LAB | Facility: CLINIC | Age: 52
End: 2022-03-02
Payer: COMMERCIAL

## 2022-03-02 DIAGNOSIS — Z20.822 ENCOUNTER FOR LABORATORY TESTING FOR COVID-19 VIRUS: ICD-10-CM

## 2022-03-02 LAB — SARS-COV-2 RNA RESP QL NAA+PROBE: NEGATIVE

## 2022-03-02 PROCEDURE — U0003 INFECTIOUS AGENT DETECTION BY NUCLEIC ACID (DNA OR RNA); SEVERE ACUTE RESPIRATORY SYNDROME CORONAVIRUS 2 (SARS-COV-2) (CORONAVIRUS DISEASE [COVID-19]), AMPLIFIED PROBE TECHNIQUE, MAKING USE OF HIGH THROUGHPUT TECHNOLOGIES AS DESCRIBED BY CMS-2020-01-R: HCPCS

## 2022-03-02 PROCEDURE — U0005 INFEC AGEN DETEC AMPLI PROBE: HCPCS

## 2022-03-04 DIAGNOSIS — E78.5 HYPERLIPIDEMIA LDL GOAL <130: ICD-10-CM

## 2022-03-07 RX ORDER — SIMVASTATIN 20 MG
TABLET ORAL
Qty: 90 TABLET | Refills: 0 | Status: SHIPPED | OUTPATIENT
Start: 2022-03-07 | End: 2022-06-15

## 2022-03-12 DIAGNOSIS — Z11.52 ENCOUNTER FOR SCREENING FOR COVID-19: Primary | ICD-10-CM

## 2022-03-13 ENCOUNTER — LAB (OUTPATIENT)
Dept: LAB | Facility: CLINIC | Age: 52
End: 2022-03-13
Payer: COMMERCIAL

## 2022-03-13 DIAGNOSIS — Z11.52 ENCOUNTER FOR SCREENING FOR COVID-19: Primary | ICD-10-CM

## 2022-03-13 PROCEDURE — U0005 INFEC AGEN DETEC AMPLI PROBE: HCPCS

## 2022-03-13 PROCEDURE — U0003 INFECTIOUS AGENT DETECTION BY NUCLEIC ACID (DNA OR RNA); SEVERE ACUTE RESPIRATORY SYNDROME CORONAVIRUS 2 (SARS-COV-2) (CORONAVIRUS DISEASE [COVID-19]), AMPLIFIED PROBE TECHNIQUE, MAKING USE OF HIGH THROUGHPUT TECHNOLOGIES AS DESCRIBED BY CMS-2020-01-R: HCPCS

## 2022-03-14 LAB — SARS-COV-2 RNA RESP QL NAA+PROBE: NEGATIVE

## 2022-04-09 ENCOUNTER — HEALTH MAINTENANCE LETTER (OUTPATIENT)
Age: 52
End: 2022-04-09

## 2022-04-14 ENCOUNTER — HOSPITAL ENCOUNTER (EMERGENCY)
Facility: CLINIC | Age: 52
Discharge: HOME | End: 2022-04-14
Attending: EMERGENCY MEDICINE | Admitting: EMERGENCY MEDICINE
Payer: COMMERCIAL

## 2022-04-14 ENCOUNTER — APPOINTMENT (OUTPATIENT)
Dept: GENERAL RADIOLOGY | Facility: CLINIC | Age: 52
End: 2022-04-14
Attending: EMERGENCY MEDICINE
Payer: COMMERCIAL

## 2022-04-14 VITALS
TEMPERATURE: 98 F | HEART RATE: 65 BPM | RESPIRATION RATE: 14 BRPM | OXYGEN SATURATION: 97 % | SYSTOLIC BLOOD PRESSURE: 143 MMHG | DIASTOLIC BLOOD PRESSURE: 87 MMHG

## 2022-04-14 DIAGNOSIS — I49.3 PVC'S (PREMATURE VENTRICULAR CONTRACTIONS): ICD-10-CM

## 2022-04-14 DIAGNOSIS — R00.2 PALPITATIONS: ICD-10-CM

## 2022-04-14 LAB
ANION GAP SERPL CALCULATED.3IONS-SCNC: 5 MMOL/L (ref 3–14)
BASOPHILS # BLD AUTO: 0 10E3/UL (ref 0–0.2)
BASOPHILS NFR BLD AUTO: 1 %
BUN SERPL-MCNC: 14 MG/DL (ref 7–30)
CALCIUM SERPL-MCNC: 9.1 MG/DL (ref 8.5–10.1)
CHLORIDE BLD-SCNC: 101 MMOL/L (ref 94–109)
CO2 SERPL-SCNC: 28 MMOL/L (ref 20–32)
CREAT SERPL-MCNC: 0.88 MG/DL (ref 0.66–1.25)
EOSINOPHIL # BLD AUTO: 0.2 10E3/UL (ref 0–0.7)
EOSINOPHIL NFR BLD AUTO: 4 %
ERYTHROCYTE [DISTWIDTH] IN BLOOD BY AUTOMATED COUNT: 13.2 % (ref 10–15)
GFR SERPL CREATININE-BSD FRML MDRD: >90 ML/MIN/1.73M2
GLUCOSE BLD-MCNC: 115 MG/DL (ref 70–99)
HCT VFR BLD AUTO: 42.2 % (ref 40–53)
HGB BLD-MCNC: 14.3 G/DL (ref 13.3–17.7)
IMM GRANULOCYTES # BLD: 0 10E3/UL
IMM GRANULOCYTES NFR BLD: 0 %
LYMPHOCYTES # BLD AUTO: 1.3 10E3/UL (ref 0.8–5.3)
LYMPHOCYTES NFR BLD AUTO: 30 %
MCH RBC QN AUTO: 29.7 PG (ref 26.5–33)
MCHC RBC AUTO-ENTMCNC: 33.9 G/DL (ref 31.5–36.5)
MCV RBC AUTO: 88 FL (ref 78–100)
MONOCYTES # BLD AUTO: 0.3 10E3/UL (ref 0–1.3)
MONOCYTES NFR BLD AUTO: 7 %
NEUTROPHILS # BLD AUTO: 2.5 10E3/UL (ref 1.6–8.3)
NEUTROPHILS NFR BLD AUTO: 58 %
NRBC # BLD AUTO: 0 10E3/UL
NRBC BLD AUTO-RTO: 0 /100
PLATELET # BLD AUTO: 190 10E3/UL (ref 150–450)
POTASSIUM BLD-SCNC: 4 MMOL/L (ref 3.4–5.3)
RBC # BLD AUTO: 4.82 10E6/UL (ref 4.4–5.9)
SODIUM SERPL-SCNC: 134 MMOL/L (ref 133–144)
TROPONIN I SERPL HS-MCNC: <3 NG/L
TSH SERPL DL<=0.005 MIU/L-ACNC: 2.07 MU/L (ref 0.4–4)
WBC # BLD AUTO: 4.3 10E3/UL (ref 4–11)

## 2022-04-14 PROCEDURE — 84484 ASSAY OF TROPONIN QUANT: CPT | Performed by: EMERGENCY MEDICINE

## 2022-04-14 PROCEDURE — 71046 X-RAY EXAM CHEST 2 VIEWS: CPT

## 2022-04-14 PROCEDURE — 85025 COMPLETE CBC W/AUTO DIFF WBC: CPT | Performed by: EMERGENCY MEDICINE

## 2022-04-14 PROCEDURE — 82310 ASSAY OF CALCIUM: CPT | Performed by: EMERGENCY MEDICINE

## 2022-04-14 PROCEDURE — 93010 ELECTROCARDIOGRAM REPORT: CPT | Performed by: EMERGENCY MEDICINE

## 2022-04-14 PROCEDURE — 84443 ASSAY THYROID STIM HORMONE: CPT | Performed by: EMERGENCY MEDICINE

## 2022-04-14 PROCEDURE — 36415 COLL VENOUS BLD VENIPUNCTURE: CPT | Performed by: EMERGENCY MEDICINE

## 2022-04-14 PROCEDURE — 93005 ELECTROCARDIOGRAM TRACING: CPT | Performed by: EMERGENCY MEDICINE

## 2022-04-14 PROCEDURE — 99285 EMERGENCY DEPT VISIT HI MDM: CPT | Mod: 25 | Performed by: EMERGENCY MEDICINE

## 2022-04-14 RX ORDER — METOPROLOL TARTRATE 25 MG/1
25 TABLET, FILM COATED ORAL 2 TIMES DAILY
Qty: 60 TABLET | Refills: 0 | Status: SHIPPED | OUTPATIENT
Start: 2022-04-14 | End: 2022-06-06

## 2022-04-14 NOTE — DISCHARGE INSTRUCTIONS
Your blood work, EKG, and chest x-ray were all normal.      Looking back on your monitor here in the department, you have occasional PVCs or premature and regular contractions.  There are numerous things that could cause PVCs, but yours are infrequent and do not appear to be dangerous or causing any strain on your heart    PVCs can be controlled and be less symptomatic or frequent when treated with a blood pressure medication such as a beta-blocker.  A prescription for metoprolol was sent to your pharmacy.  You may wish to start with half a tablet by mouth morning and evening and make sure you tolerate this.  If you do not get lightheaded or dizzy while taking this medicine, or if you monitor your blood pressure at home and it stays above 120/70, you may increase to 1 full tablet morning and evening    A message was sent to get you worked in to the clinic for a follow-up visit.  The provider can discuss any further work-up that might be necessary, adjust medication, or refill medication if this is helpful    If you develop chest pain, dizziness or lightheadedness, vision changes, slurred speech, or worsening symptoms, do not hesitate to return to the emergency room for evaluation

## 2022-04-14 NOTE — ED PROVIDER NOTES
History     Chief Complaint   Patient presents with     Palpitations     HPI  Carlos Keyes is a 51 year old male who presents to the emergency room for chest fluttering.  Says that he has felt this intermittently over the years, but it will usually last for a few seconds, go away and not return.  Over the last 1 week he has felt this happen more frequently.  Palpitations and fluttering can come on at any time, even at rest.  Will last for a few seconds and then resolve on their own.  Denies any shortness of breath, chest pain or pressure with symptoms.  Today he felt a bit dizzy and lightheaded when the palpitations came on so he came to the ER for further evaluation.  So that he has had an extensive cardiac work-up in the past that was normal.  Does have family history of coronary artery disease.  He chews tobacco.  Does not take any medication currently for high blood pressure.  Is wondering if his symptoms are due to increasing stress in his personal life.    Allergies:  Allergies   Allergen Reactions     No Known Drug Allergies        Problem List:    Patient Active Problem List    Diagnosis Date Noted     S/P arthroscopy of right knee 2018     Priority: Medium     Family history of ischemic heart disease 2015     Priority: Medium     Mom  age 55       HYPERLIPIDEMIA LDL GOAL <130 10/31/2010     Priority: Medium        Past Medical History:    Past Medical History:   Diagnosis Date     Complex tear of medial meniscus of right knee as current injury, initial encounter 2018     Toe pain 2009       Past Surgical History:    Past Surgical History:   Procedure Laterality Date     ARTHROSCOPY KNEE WITH DEBRIDEMENT JOINT, COMBINED Right 2018    Procedure: ARTHROSCOPY KNEE WITH DEBRIDEMENT JOINT;  Right knee arthroscopy with medial meniscus debridement;  Surgeon: Seth Basilio MD;  [facility]     COLONOSCOPY N/A 2019    Procedure: COLONOSCOPY;  Surgeon: Ginger  Tyler Ortiz DO;  [facility]: [facility]     FRACTURE TX, ANKLE RT/LT      Fracture TX Ankle RT/LT     HERNIA REPAIR, INGUINAL RT/LT  10/13/2006    Laparoscopic left inguinal hernia repair.     OPEN REDUCTION INTERNAL FIXATION FOREARM Right 11/5/2014    Procedure: OPEN REDUCTION INTERNAL FIXATION FOREARM;  Surgeon: Seth Basilio MD;  Location: [facility]     REPAIR HAMMER TOE  10/14/2011    Procedure:REPAIR HAMMER TOE; Correction Toes 4th,5th Toe Right Foot; Surgeon:ASIF CRUM; Location:[facility]     VASECTOMY Bilateral 05/06/05       Family History:    Family History   Problem Relation Age of Onset     C.A.D. Mother      C.A.D. Maternal Grandfather      Diabetes No family hx of      Cancer - colorectal No family hx of        Social History:  Marital Status:   [2]  Social History     Tobacco Use     Smoking status: Never Smoker     Smokeless tobacco: Former User     Types: Chew   Substance Use Topics     Alcohol use: Yes     Comment: daily 2 drinks     Drug use: No        Medications:    metoprolol tartrate (LOPRESSOR) 25 MG tablet  simvastatin (ZOCOR) 20 MG tablet  allopurinol (ZYLOPRIM) 100 MG tablet  indomethacin (INDOCIN) 50 MG capsule  PFIZER-BIONTECH COVID-19 VACC 30 MCG/0.3ML injection          Review of Systems   All other systems reviewed and are negative.      Physical Exam   BP: (!) 160/101  Pulse: 67  Temp: 98  F (36.7  C)  Resp: 14  SpO2: 99 %      Physical Exam  Vitals and nursing note reviewed.   Constitutional:       General: He is not in acute distress.     Appearance: He is not diaphoretic.   HENT:      Head: Atraumatic.   Eyes:      General: No scleral icterus.     Pupils: Pupils are equal, round, and reactive to light.   Cardiovascular:      Rate and Rhythm: Normal rate and regular rhythm.      Heart sounds: Normal heart sounds. No murmur heard.  Pulmonary:      Effort: Pulmonary effort is normal. No respiratory distress.      Breath sounds: Normal breath sounds.   Abdominal:       General: Bowel sounds are normal.      Palpations: Abdomen is soft.      Tenderness: There is no abdominal tenderness.   Musculoskeletal:         General: No tenderness.      Right lower leg: No edema.      Left lower leg: No edema.   Skin:     General: Skin is warm.      Findings: No rash.   Neurological:      Mental Status: He is alert.         ED Course                 Procedures              EKG Interpretation:      Interpreted by Silvia Pappas DO  Time reviewed: 1240  Symptoms at time of EKG: Palpitations   Rhythm: normal sinus   Rate: normal  Axis: normal  Ectopy: none  Conduction: normal  ST Segments/ T Waves: No ST-T wave changes  Q Waves: none  Comparison to prior: Unchanged    Clinical Impression: normal EKG          Critical Care time:  none               Results for orders placed or performed during the hospital encounter of 04/14/22 (from the past 24 hour(s))   CBC with platelets differential    Narrative    The following orders were created for panel order CBC with platelets differential.  Procedure                               Abnormality         Status                     ---------                               -----------         ------                     CBC with platelets and d...[[id number]]                      Final result                 Please view results for these tests on the individual orders.   Basic metabolic panel   Result Value Ref Range    Sodium 134 133 - 144 mmol/L    Potassium 4.0 3.4 - 5.3 mmol/L    Chloride 101 94 - 109 mmol/L    Carbon Dioxide (CO2) 28 20 - 32 mmol/L    Anion Gap 5 3 - 14 mmol/L    Urea Nitrogen 14 7 - 30 mg/dL    Creatinine 0.88 0.66 - 1.25 mg/dL    Calcium 9.1 8.5 - 10.1 mg/dL    Glucose 115 (H) 70 - 99 mg/dL    GFR Estimate >90 >60 mL/min/1.73m2   Troponin I   Result Value Ref Range    Troponin I High Sensitivity <3 <79 ng/L   TSH with free T4 reflex   Result Value Ref Range    TSH 2.07 0.40 - 4.00 mU/L   CBC with platelets and differential   Result  Value Ref Range    WBC Count 4.3 4.0 - 11.0 10e3/uL    RBC Count 4.82 4.40 - 5.90 10e6/uL    Hemoglobin 14.3 13.3 - 17.7 g/dL    Hematocrit 42.2 40.0 - 53.0 %    MCV 88 78 - 100 fL    MCH 29.7 26.5 - 33.0 pg    MCHC 33.9 31.5 - 36.5 g/dL    RDW 13.2 10.0 - 15.0 %    Platelet Count 190 150 - 450 10e3/uL    % Neutrophils 58 %    % Lymphocytes 30 %    % Monocytes 7 %    % Eosinophils 4 %    % Basophils 1 %    % Immature Granulocytes 0 %    NRBCs per 100 WBC 0 <1 /100    Absolute Neutrophils 2.5 1.6 - 8.3 10e3/uL    Absolute Lymphocytes 1.3 0.8 - 5.3 10e3/uL    Absolute Monocytes 0.3 0.0 - 1.3 10e3/uL    Absolute Eosinophils 0.2 0.0 - 0.7 10e3/uL    Absolute Basophils 0.0 0.0 - 0.2 10e3/uL    Absolute Immature Granulocytes 0.0 <=0.4 10e3/uL    Absolute NRBCs 0.0 10e3/uL   XR Chest 2 Views    Narrative    XR CHEST 2 VW   4/14/2022 1:03 PM     HISTORY: Palpitations    COMPARISON: None.      Impression    IMPRESSION: Mild bibasilar opacities represent either atelectasis or  infection. No pleural effusion. Normal heart size and pulmonary  vascularity.    WU ALEJANDRA MD         SYSTEM ID:  [id number]       Medications - No data to display    Assessments & Plan (with Medical Decision Making)  Carlos is a 51-year-old male presenting to the emergency room with palpitations.  Have been occurring intermittently but getting worse over the last week.  Was feeling dizzy and lightheaded when they occurred today.  See history and focused physical exam as above  Pleasant 51-year-old male in no acute distress, is hypertensive on arrival with blood pressure 160/101 but the remainder of his vital signs are stable.  EKG was obtained at the bedside shows normal sinus rhythm without any acute ST changes or ectopy.  When compared with previous EKG is unchanged.  Agreeable to work-up with chest x-ray and labs.  Will remain on monitor  Labs and imaging as above.  No acute concerning findings.  Patient had noted several PVCs on the monitor,  looked back on rhythm strip and noted occasional PVC.  Since he has been symptomatic, and blood pressure could tolerate a beta-blocker, will initiate on metoprolol.  Recommended he follow-up with his primary provider and called registration to get an appointment in the clinic within the next 1 week to follow-up.  Discussed signs and symptoms that would indicate he needs to return to the emergency room.  Discharged in no distress       I have reviewed the nursing notes.    I have reviewed the findings, diagnosis, plan and need for follow up with the patient.       Discharge Medication List as of 4/14/2022  3:10 PM      START taking these medications    Details   metoprolol tartrate (LOPRESSOR) 25 MG tablet Take 1 tablet (25 mg) by mouth 2 times daily, Disp-60 tablet, R-0, E-Prescribe             Final diagnoses:   Palpitations   PVC's (premature ventricular contractions)       4/14/2022   [facility] EMERGENCY DEPT     Silvia Pappas DO  04/14/22 [id number]

## 2022-04-14 NOTE — ED TRIAGE NOTES
"Presents with complaints of feeling light heaeded over the past few days and complaints of heart fluttering. \"It could be anxiety or stress\". States today it has gotten worse.  "

## 2022-06-06 ENCOUNTER — OFFICE VISIT (OUTPATIENT)
Dept: FAMILY MEDICINE | Facility: CLINIC | Age: 52
End: 2022-06-06
Payer: COMMERCIAL

## 2022-06-06 VITALS
WEIGHT: 246.1 LBS | DIASTOLIC BLOOD PRESSURE: 76 MMHG | RESPIRATION RATE: 14 BRPM | TEMPERATURE: 97.4 F | BODY MASS INDEX: 29.97 KG/M2 | OXYGEN SATURATION: 98 % | HEART RATE: 85 BPM | SYSTOLIC BLOOD PRESSURE: 122 MMHG | HEIGHT: 76 IN

## 2022-06-06 DIAGNOSIS — E78.5 HYPERLIPIDEMIA LDL GOAL <130: ICD-10-CM

## 2022-06-06 DIAGNOSIS — Z82.49 FAMILY HISTORY OF ISCHEMIC HEART DISEASE: Primary | ICD-10-CM

## 2022-06-06 PROCEDURE — 99213 OFFICE O/P EST LOW 20 MIN: CPT | Performed by: FAMILY MEDICINE

## 2022-06-06 ASSESSMENT — PAIN SCALES - GENERAL: PAINLEVEL: NO PAIN (0)

## 2022-06-06 NOTE — PROGRESS NOTES
"  Assessment & Plan     Family history of ischemic heart disease  And he was seen for palpitations in the emergency room.  Electrocardiogram was normal the abnormal rhythm while being monitored there.  He had been on metoprolol but he is discontinued that.  He feels that stress.  Does not want any further work-up at this time.  He had a work-up 8 years ago with a stress echo and everything was normal.  He is to report if he has further palpitations or elevations in blood pressure.    Hyperlipidemia LDL goal <130  He is on simvastatin his last numbers 4 months ago are really good.  Continue on the same.  He is also to monitor his blood pressure.               BMI:   Estimated body mass index is 29.96 kg/m  as calculated from the following:    Height as of this encounter: 1.93 m (6' 4\").    Weight as of this encounter: 111.6 kg (246 lb 1.6 oz).   Weight management plan: Discussed healthy diet and exercise guidelines        No follow-ups on file.    Ivan Delaney MD  [facility] MAIKEL Gonzalez is a 51 year old who presents for the following health issues : In the emergency room for some palpitations.  He thinks stress brought this on.  Cardiac work-up was normal.  He had a stress echo 8 years ago which was fine.  No chest pain.  He has discontinued metoprolol.  He is doing just fine.    HPI     ED/UC Followup:    Facility:  [facility]  Date of visit: 04/14/2022  Reason for visit: Palpitations  Current Status: Patient states he is doing well           Review of Systems   Constitutional, HEENT, cardiovascular, pulmonary, gi and gu systems are negative, except as otherwise noted.      Objective    There were no vitals taken for this visit.  There is no height or weight on file to calculate BMI.  Physical Exam   GENERAL: healthy, alert and no distress  EYES: Eyes grossly normal to inspection, PERRL and conjunctivae and sclerae normal  NECK: no adenopathy, no asymmetry, " masses, or scars and thyroid normal to palpation  RESP: lungs clear to auscultation - no rales, rhonchi or wheezes  CV: regular rate and rhythm, normal S1 S2, no S3 or S4, no murmur, click or rub, no peripheral edema and peripheral pulses strong  MS: no gross musculoskeletal defects noted, no edema  NEURO: Normal strength and tone, mentation intact and speech normal  PSYCH: mentation appears normal, affect normal/bright

## 2022-06-12 DIAGNOSIS — E78.5 HYPERLIPIDEMIA LDL GOAL <130: ICD-10-CM

## 2022-06-15 RX ORDER — SIMVASTATIN 20 MG
TABLET ORAL
Qty: 90 TABLET | Refills: 1 | Status: SHIPPED | OUTPATIENT
Start: 2022-06-15 | End: 2023-01-17

## 2022-09-07 DIAGNOSIS — M10.00 ACUTE IDIOPATHIC GOUT, UNSPECIFIED SITE: ICD-10-CM

## 2022-09-09 RX ORDER — ALLOPURINOL 100 MG/1
TABLET ORAL
Qty: 90 TABLET | Refills: 0 | Status: SHIPPED | OUTPATIENT
Start: 2022-09-09 | End: 2022-12-28

## 2022-09-09 NOTE — TELEPHONE ENCOUNTER
Routing refill request to provider for review/approval because:  Labs not current:  ALT  A break in medication    Taigo Coles RN

## 2022-10-09 ENCOUNTER — HEALTH MAINTENANCE LETTER (OUTPATIENT)
Age: 52
End: 2022-10-09

## 2022-10-26 NOTE — ADDENDUM NOTE
Addended by: JAYCE KIRBY on: 8/4/2021 11:06 AM     Modules accepted: Orders    
Regular rate and rhythm, Heart sounds S1 S2 present, no murmurs, rubs or gallops

## 2022-11-07 NOTE — NURSING NOTE
Health Maintenance Due   Topic Date Due     HIV SCREENING  09/06/1983     PREVENTIVE CARE VISIT  09/03/2009     ZOSTER IMMUNIZATION (1 of 2) 09/06/2018     Estrellita LUCERO LPN    
177.8

## 2022-12-24 DIAGNOSIS — M10.00 ACUTE IDIOPATHIC GOUT, UNSPECIFIED SITE: ICD-10-CM

## 2022-12-26 NOTE — TELEPHONE ENCOUNTER
"Pending Prescriptions:                       Disp   Refills    allopurinol (ZYLOPRIM) 100 MG tablet [Phar*90 tab*0        Sig: TAKE ONE TABLET BY MOUTH ONCE DAILY    Routing refill request to provider for review/approval because:  Labs not current:      Requested Prescriptions   Pending Prescriptions Disp Refills    allopurinol (ZYLOPRIM) 100 MG tablet [Pharmacy Med Name: ALLOPURINOL 100MG TABS] 90 tablet 0     Sig: TAKE ONE TABLET BY MOUTH ONCE DAILY       Gout Agents Protocol Failed - 12/24/2022  9:41 AM        Failed - ALT on file in past 12 months     Recent Labs   Lab Test 11/27/14  1111   ALT 17             Passed - CBC on file in past 12 months     Recent Labs   Lab Test 04/14/22  1251   WBC 4.3   RBC 4.82   HGB 14.3   HCT 42.2                    Passed - Has Uric Acid on file in past 12 months and value is less than 6     Recent Labs   Lab Test 02/17/22  0904   URIC 5.3     If level is 6mg/dL or greater, ok to refill one time and refer to provider.           Passed - Recent (12 mo) or future (30 days) visit within the authorizing provider's specialty     Patient has had an office visit with the authorizing provider or a provider within the authorizing providers department within the previous 12 mos or has a future within next 30 days. See \"Patient Info\" tab in inbasket, or \"Choose Columns\" in Meds & Orders section of the refill encounter.              Passed - Medication is active on med list        Passed - Patient is age 18 or older        Passed - Normal serum creatinine on file in the past 12 months     Recent Labs   Lab Test 04/14/22  1251   CR 0.88       Ok to refill medication if creatinine is low                     "

## 2022-12-28 RX ORDER — ALLOPURINOL 100 MG/1
TABLET ORAL
Qty: 90 TABLET | Refills: 3 | Status: SHIPPED | OUTPATIENT
Start: 2022-12-28 | End: 2024-01-30

## 2022-12-28 NOTE — TELEPHONE ENCOUNTER
Patient informed via mychart to schedule, 01/2 reminder if not read to send letter.     Closing encounter.   Vesta Del Rosario MA

## 2023-01-15 DIAGNOSIS — E78.5 HYPERLIPIDEMIA LDL GOAL <130: ICD-10-CM

## 2023-01-17 RX ORDER — SIMVASTATIN 20 MG
TABLET ORAL
Qty: 90 TABLET | Refills: 0 | Status: SHIPPED | OUTPATIENT
Start: 2023-01-17 | End: 2023-04-25

## 2023-01-17 NOTE — TELEPHONE ENCOUNTER
Prescription approved per [facility] Refill Protocol.  Alissa Esparza RN on 1/17/2023 at 1:40 PM

## 2023-02-09 ENCOUNTER — OFFICE VISIT (OUTPATIENT)
Dept: FAMILY MEDICINE | Facility: CLINIC | Age: 53
End: 2023-02-09
Payer: COMMERCIAL

## 2023-02-09 VITALS
DIASTOLIC BLOOD PRESSURE: 86 MMHG | RESPIRATION RATE: 20 BRPM | OXYGEN SATURATION: 98 % | BODY MASS INDEX: 32.12 KG/M2 | SYSTOLIC BLOOD PRESSURE: 110 MMHG | TEMPERATURE: 96.8 F | WEIGHT: 250.25 LBS | HEART RATE: 76 BPM | HEIGHT: 74 IN

## 2023-02-09 DIAGNOSIS — Z00.00 ROUTINE GENERAL MEDICAL EXAMINATION AT A HEALTH CARE FACILITY: Primary | ICD-10-CM

## 2023-02-09 DIAGNOSIS — Z12.5 SCREENING FOR PROSTATE CANCER: ICD-10-CM

## 2023-02-09 DIAGNOSIS — F10.90 CHRONIC ALCOHOL USE: ICD-10-CM

## 2023-02-09 DIAGNOSIS — M1A.00X0 IDIOPATHIC CHRONIC GOUT WITHOUT TOPHUS, UNSPECIFIED SITE: ICD-10-CM

## 2023-02-09 DIAGNOSIS — E78.5 HYPERLIPIDEMIA LDL GOAL <130: ICD-10-CM

## 2023-02-09 LAB
ALBUMIN SERPL BCG-MCNC: 4.6 G/DL (ref 3.5–5.2)
ALP SERPL-CCNC: 66 U/L (ref 40–129)
ALT SERPL W P-5'-P-CCNC: 38 U/L (ref 10–50)
ANION GAP SERPL CALCULATED.3IONS-SCNC: 9 MMOL/L (ref 7–15)
AST SERPL W P-5'-P-CCNC: 30 U/L (ref 10–50)
BILIRUB SERPL-MCNC: 0.6 MG/DL
BUN SERPL-MCNC: 15.8 MG/DL (ref 6–20)
CALCIUM SERPL-MCNC: 10 MG/DL (ref 8.6–10)
CHLORIDE SERPL-SCNC: 99 MMOL/L (ref 98–107)
CHOLEST SERPL-MCNC: 224 MG/DL
CREAT SERPL-MCNC: 0.92 MG/DL (ref 0.67–1.17)
DEPRECATED HCO3 PLAS-SCNC: 29 MMOL/L (ref 22–29)
GFR SERPL CREATININE-BSD FRML MDRD: >90 ML/MIN/1.73M2
GLUCOSE SERPL-MCNC: 106 MG/DL (ref 70–99)
HDLC SERPL-MCNC: 66 MG/DL
LDLC SERPL CALC-MCNC: 115 MG/DL
NONHDLC SERPL-MCNC: 158 MG/DL
POTASSIUM SERPL-SCNC: 4.5 MMOL/L (ref 3.4–5.3)
PROT SERPL-MCNC: 7.1 G/DL (ref 6.4–8.3)
PSA SERPL-MCNC: 0.52 NG/ML (ref 0–3.5)
SODIUM SERPL-SCNC: 137 MMOL/L (ref 136–145)
TRIGL SERPL-MCNC: 215 MG/DL
URATE SERPL-MCNC: 5.7 MG/DL (ref 3.4–7)

## 2023-02-09 PROCEDURE — 99396 PREV VISIT EST AGE 40-64: CPT | Performed by: STUDENT IN AN ORGANIZED HEALTH CARE EDUCATION/TRAINING PROGRAM

## 2023-02-09 PROCEDURE — 80053 COMPREHEN METABOLIC PANEL: CPT | Performed by: STUDENT IN AN ORGANIZED HEALTH CARE EDUCATION/TRAINING PROGRAM

## 2023-02-09 PROCEDURE — 84550 ASSAY OF BLOOD/URIC ACID: CPT | Performed by: STUDENT IN AN ORGANIZED HEALTH CARE EDUCATION/TRAINING PROGRAM

## 2023-02-09 PROCEDURE — G0103 PSA SCREENING: HCPCS | Performed by: STUDENT IN AN ORGANIZED HEALTH CARE EDUCATION/TRAINING PROGRAM

## 2023-02-09 PROCEDURE — 80061 LIPID PANEL: CPT | Performed by: STUDENT IN AN ORGANIZED HEALTH CARE EDUCATION/TRAINING PROGRAM

## 2023-02-09 PROCEDURE — 36415 COLL VENOUS BLD VENIPUNCTURE: CPT | Performed by: STUDENT IN AN ORGANIZED HEALTH CARE EDUCATION/TRAINING PROGRAM

## 2023-02-09 ASSESSMENT — ENCOUNTER SYMPTOMS
HEADACHES: 0
ARTHRALGIAS: 0
CHILLS: 0
MYALGIAS: 0
DIARRHEA: 0
NAUSEA: 0
PALPITATIONS: 0
DYSURIA: 0
HEARTBURN: 0
DIZZINESS: 0
FREQUENCY: 0
FEVER: 0
COUGH: 0
JOINT SWELLING: 0
CONSTIPATION: 0
WEAKNESS: 0
EYE PAIN: 0
PARESTHESIAS: 0
ABDOMINAL PAIN: 0
NERVOUS/ANXIOUS: 0
SORE THROAT: 0
HEMATOCHEZIA: 0
SHORTNESS OF BREATH: 0
HEMATURIA: 0

## 2023-02-09 NOTE — PROGRESS NOTES
SUBJECTIVE:   CC: Carlos is an 52 year old who presents for preventative health visit.     Patient has been advised of split billing requirements and indicates understanding: No  Healthy Habits:     Getting at least 3 servings of Calcium per day:  NO    Bi-annual eye exam:  Yes    Dental care twice a year:  Yes    Sleep apnea or symptoms of sleep apnea:  None    Diet:  Regular (no restrictions)    Frequency of exercise:  1 day/week    Duration of exercise:  Less than 15 minutes    Taking medications regularly:  No    Barriers to taking medications:  Not applicable    Medication side effects:  Not applicable    PHQ-2 Total Score: 0    Additional concerns today:  No        Today's PHQ-2 Score:   PHQ-2 ( 1999 [employer]) 2/9/2023   Q1: Little interest or pleasure in doing things 0   Q2: Feeling down, depressed or hopeless 0   PHQ-2 Score 0   PHQ-2 Total Score (12-17 Years)- Positive if 3 or more points; Administer PHQ-A if positive -   Q1: Little interest or pleasure in doing things Not at all   Q2: Feeling down, depressed or hopeless Not at all   PHQ-2 Score 0       Have you ever done Advance Care Planning? (For example, a Health Directive, POLST, or a discussion with a medical provider or your loved ones about your wishes): Yes, patient states has an Advance Care Planning document and will bring a copy to the clinic.    Social History     Tobacco Use     Smoking status: Never     Smokeless tobacco: Former     Types: Chew   Substance Use Topics     Alcohol use: Yes     Comment: daily 2 drinks     If you drink alcohol do you typically have >3 drinks per day or >7 drinks per week? No    Alcohol Use 2/9/2023   Prescreen: >3 drinks/day or >7 drinks/week? No   Prescreen: >3 drinks/day or >7 drinks/week? -   AUDIT SCORE  -       Last PSA:   PSA   Date Value Ref Range Status   01/14/2021 0.91 0 - 4 ug/L Final     Comment:     Assay Method:  Chemiluminescence using Siemens Vista analyzer       Reviewed orders with patient. Reviewed  health maintenance and updated orders accordingly - Yes  Lab work is in process    Reviewed and updated as needed this visit by clinical staff   Tobacco  Allergies  Meds              Reviewed and updated as needed this visit by Provider     Meds             Past Medical History:   Diagnosis Date     Complex tear of medial meniscus of right knee as current injury, initial encounter 8/7/2018     Toe pain 1/29/2009      Past Surgical History:   Procedure Laterality Date     ARTHROSCOPY KNEE WITH DEBRIDEMENT JOINT, COMBINED Right 7/25/2018    Procedure: ARTHROSCOPY KNEE WITH DEBRIDEMENT JOINT;  Right knee arthroscopy with medial meniscus debridement;  Surgeon: Seth Basilio MD;  [facility]     COLONOSCOPY N/A 7/16/2019    Procedure: COLONOSCOPY;  Surgeon: Tyler Miles DO;  [facility]     FRACTURE TX, ANKLE RT/LT      Fracture TX Ankle RT/LT     HERNIA REPAIR, INGUINAL RT/LT  10/13/2006    Laparoscopic left inguinal hernia repair.     OPEN REDUCTION INTERNAL FIXATION FOREARM Right 11/5/2014    Procedure: OPEN REDUCTION INTERNAL FIXATION FOREARM;  Surgeon: Seth Basilio MD;  [facility]     REPAIR HAMMER TOE  10/14/2011    Procedure:REPAIR HAMMER TOE; Correction Toes 4th,5th Toe Right Foot; Surgeon:ASIF CRUM; Location:[facility]     VASECTOMY Bilateral 05/06/05       Review of Systems   Constitutional: Negative for chills and fever.   HENT: Positive for congestion. Negative for ear pain, hearing loss and sore throat.    Eyes: Negative for pain and visual disturbance.   Respiratory: Negative for cough and shortness of breath.    Cardiovascular: Negative for chest pain, palpitations and peripheral edema.   Gastrointestinal: Negative for abdominal pain, constipation, diarrhea, heartburn, hematochezia and nausea.   Genitourinary: Negative for dysuria, frequency, genital sores, hematuria, impotence, penile discharge and urgency.   Musculoskeletal: Negative for arthralgias,  "joint swelling and myalgias.   Skin: Negative for rash.   Neurological: Negative for dizziness, weakness, headaches and paresthesias.   Psychiatric/Behavioral: Negative for mood changes. The patient is not nervous/anxious.      OBJECTIVE:   /86   Pulse 76   Temp 96.8  F (36  C) (Temporal)   Resp 20   Ht 1.88 m (6' 2\")   Wt 113.5 kg (250 lb 4 oz)   SpO2 98%   BMI 32.13 kg/m      Physical Exam  GENERAL: healthy, alert and no distress  EYES: Eyes grossly normal to inspection, PERRL and conjunctivae and sclerae normal  HENT: ear canals and TM's normal, nose and mouth without ulcers or lesions  NECK: no adenopathy, no asymmetry, masses, or scars and thyroid normal to palpation  RESP: lungs clear to auscultation - no rales, rhonchi or wheezes  CV: regular rate and rhythm, normal S1 S2, no S3 or S4, no murmur, click or rub, no peripheral edema and peripheral pulses strong  ABDOMEN: soft, nontender, no hepatosplenomegaly, no masses and bowel sounds normal  MS: no gross musculoskeletal defects noted, no edema  SKIN: no suspicious lesions or rashes  NEURO: Normal strength and tone, mentation intact and speech normal  PSYCH: mentation appears normal, affect normal/bright      ASSESSMENT/PLAN:   Carlos was seen today for physical.    Diagnoses and all orders for this visit:    Routine general medical examination at a health care facility    Chronic alcohol use  -     Comprehensive metabolic panel (BMP + Alb, Alk Phos, ALT, AST, Total. Bili, TP); Future    Hyperlipidemia LDL goal <130  Continue simvastatin without change.  We will repeat lipids today.  -     Lipid panel reflex to direct LDL Fasting; Future    Idiopathic chronic gout without tophus, unspecified site  Gout has been stable with diet changes and allopurinol.  Repeat uric acid today.  -     Uric acid; Future    Screening for prostate cancer  -     PSA, screen; Future    Other orders  -     REVIEW OF HEALTH MAINTENANCE PROTOCOL ORDERS        Patient has " "been advised of split billing requirements and indicates understanding: Yes      COUNSELING:   Reviewed preventive health counseling, as reflected in patient instructions       Regular exercise       Healthy diet/nutrition       Vision screening       Hearing screening       Immunizations    Declined: Covid-19, Hepatitis B, Influenza and Pneumococcal            Aspirin prophylaxis        Alcohol Use        Safe sex practices/STD prevention       Consider Hep C screening for all patients one time for ages 18-79 years       HIV screeninx in teen years, 1x in adult years, and at intervals if high risk       Colorectal cancer screening       Prostate cancer screening      BMI:   Estimated body mass index is 32.13 kg/m  as calculated from the following:    Height as of this encounter: 1.88 m (6' 2\").    Weight as of this encounter: 113.5 kg (250 lb 4 oz).   Weight management plan: Discussed healthy diet and exercise guidelines      He reports that he has never smoked. He has quit using smokeless tobacco.  His smokeless tobacco use included chew.            Juan Lott MD  [facility]  "

## 2023-04-23 DIAGNOSIS — E78.5 HYPERLIPIDEMIA LDL GOAL <130: ICD-10-CM

## 2023-04-25 RX ORDER — SIMVASTATIN 20 MG
TABLET ORAL
Qty: 90 TABLET | Refills: 3 | Status: SHIPPED | OUTPATIENT
Start: 2023-04-25 | End: 2024-05-08

## 2023-04-25 NOTE — TELEPHONE ENCOUNTER
Prescription approved per [facility] Refill Protocol.  Alissa Esparza RN on 4/25/2023 at 3:10 PM

## 2023-05-11 ENCOUNTER — HOSPITAL ENCOUNTER (EMERGENCY)
Facility: CLINIC | Age: 53
Discharge: HOME | End: 2023-05-11
Attending: EMERGENCY MEDICINE | Admitting: EMERGENCY MEDICINE
Payer: COMMERCIAL

## 2023-05-11 VITALS
OXYGEN SATURATION: 97 % | BODY MASS INDEX: 31.44 KG/M2 | RESPIRATION RATE: 16 BRPM | WEIGHT: 244.9 LBS | DIASTOLIC BLOOD PRESSURE: 92 MMHG | HEART RATE: 72 BPM | TEMPERATURE: 98.1 F | SYSTOLIC BLOOD PRESSURE: 144 MMHG
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
## 2023-05-11 DIAGNOSIS — S70.361A TICK BITE OF RIGHT THIGH WITH LOCAL REACTION, INITIAL ENCOUNTER: ICD-10-CM

## 2023-05-11 LAB
FLUAV RNA SPEC QL NAA+PROBE: NEGATIVE
FLUBV RNA RESP QL NAA+PROBE: NEGATIVE
RSV RNA SPEC NAA+PROBE: NEGATIVE
SARS-COV-2 RNA RESP QL NAA+PROBE: NEGATIVE

## 2023-05-11 PROCEDURE — 99283 EMERGENCY DEPT VISIT LOW MDM: CPT | Mod: CS | Performed by: EMERGENCY MEDICINE

## 2023-05-11 PROCEDURE — 87637 SARSCOV2&INF A&B&RSV AMP PRB: CPT | Performed by: EMERGENCY MEDICINE

## 2023-05-11 PROCEDURE — C9803 HOPD COVID-19 SPEC COLLECT: HCPCS | Performed by: EMERGENCY MEDICINE

## 2023-05-11 RX ORDER — DOXYCYCLINE 100 MG/1
100 CAPSULE ORAL 2 TIMES DAILY
Qty: 20 CAPSULE | Refills: 0 | Status: SHIPPED | OUTPATIENT
Start: 2023-05-11 | End: 2023-05-21

## 2023-05-11 ASSESSMENT — ACTIVITIES OF DAILY LIVING (ADL): ADLS_ACUITY_SCORE: 35

## 2023-05-11 NOTE — ED PROVIDER NOTES
History     Chief Complaint   Patient presents with     Tick Bite     HPI  Carlos Keyes is a 52 year old male who presents with concerns for recent tick bite and subsequent body aches, fatigue, headache and lack of energy.  No fever or chills.  No visual change.  No sinus drainage or sore throat.  No change in taste or smell.  Denies chest pain or shortness of breath.  No cough.  Denies abdominal pain, nausea or vomiting.  He states the tick was quite small but did not look engorged.  He removed the tick from his right medial thigh on .  He does have a red michelle where he removed the tick but no drainage from that area.  No other skin rashes that he is noted.  His tetanus is up-to-date.  He has had 2 doses of COVID-vaccine.    Allergies:  Allergies   Allergen Reactions     No Known Drug Allergy        Problem List:    Patient Active Problem List    Diagnosis Date Noted     S/P arthroscopy of right knee 2018     Priority: Medium     Family history of ischemic heart disease 2015     Priority: Medium     Mom  age 55       HYPERLIPIDEMIA LDL GOAL <130 10/31/2010     Priority: Medium        Past Medical History:    Past Medical History:   Diagnosis Date     Complex tear of medial meniscus of right knee as current injury, initial encounter 2018     Toe pain 2009       Past Surgical History:    Past Surgical History:   Procedure Laterality Date     ARTHROSCOPY KNEE WITH DEBRIDEMENT JOINT, COMBINED Right 2018    Procedure: ARTHROSCOPY KNEE WITH DEBRIDEMENT JOINT;  Right knee arthroscopy with medial meniscus debridement;  Surgeon: Seth Basilio MD;  Location: [facility]     COLONOSCOPY N/A 2019    Procedure: COLONOSCOPY;  Surgeon: Tyler Miles DO;  Location: [facility]     FRACTURE TX, ANKLE RT/LT      Fracture TX Ankle RT/LT     HERNIA REPAIR, INGUINAL RT/LT  10/13/2006    Laparoscopic left inguinal hernia repair.     OPEN REDUCTION INTERNAL FIXATION FOREARM Right 2014  Procedure: OPEN REDUCTION INTERNAL FIXATION FOREARM;  Surgeon: Seth Basilio MD;  Location: [facility]     REPAIR HAMMER TOE  10/14/2011    Procedure:REPAIR HAMMER TOE; Correction Toes 4th,5th Toe Right Foot; Surgeon:ASIF CRUM; Location:[facility]     VASECTOMY Bilateral 05/06/05       Family History:    Family History   Problem Relation Age of Onset     C.A.D. Mother      C.A.D. Maternal Grandfather      Diabetes No family hx of      Cancer - colorectal No family hx of        Social History:  Marital Status:   [2]  Social History     Tobacco Use     Smoking status: Never     Smokeless tobacco: Current     Types: Chew   Vaping Use     Vaping status: Never Used   Substance Use Topics     Alcohol use: Yes     Comment: daily 2 drinks     Drug use: No        Medications:    doxycycline hyclate (VIBRAMYCIN) 100 MG capsule  allopurinol (ZYLOPRIM) 100 MG tablet  PFIZER-BIONTECH COVID-19 VACC 30 MCG/0.3ML injection  simvastatin (ZOCOR) 20 MG tablet          Review of Systems all other systems are reviewed and are negative.    Physical Exam   BP: (!) 142/91  Pulse: 76  Temp: 98.1  F (36.7  C)  Resp: 16  Weight: 111.1 kg (244 lb 14.4 oz)  SpO2: 97 %      Physical Exam General alert cooperative male who does not look toxic or ill.  No facial swelling.  No scleral icterus.  Speech is clear.  Neck was supple.  Is not tachycardic or hypotensive.  Other than the 2 to 3 mm reddened area on his right thigh no skin rashes are present.    ED Course                 Procedures              Critical Care time:  none               Results for orders placed or performed during the hospital encounter of 05/11/23 (from the past 24 hour(s))   Symptomatic Influenza A/B, RSV, & SARS-CoV2 PCR (COVID-19) Nasopharyngeal    Specimen: Nasopharyngeal; Swab   Result Value Ref Range    Influenza A PCR Negative Negative    Influenza B PCR Negative Negative    RSV PCR Negative Negative    SARS CoV2 PCR Negative Negative    Narrative     Testing was performed using the Xpert Xpress CoV2/Flu/RSV Assay on the [employer] GeneXpert Instrument. This test should be ordered for the detection of SARS-CoV-2, influenza, and RSV viruses in individuals who meet clinical and/or epidemiological criteria. Test performance is unknown in asymptomatic patients. This test is for in vitro diagnostic use under the FDA EUA for laboratories certified under CLIA to perform high or moderate complexity testing. This test has not been FDA cleared or approved. A negative result does not rule out the presence of PCR inhibitors in the specimen or target RNA in concentration below the limit of detection for the assay. If only one viral target is positive but coinfection with multiple targets is suspected, the sample should be re-tested with another FDA cleared, approved, or authorized test, if coinfection would change clinical management. This test was validated by the [facility]. These laboratories are certified under the Clinical Laboratory Improvement Amendments of 1988 (CLIA-88) as qualified to perform high complexity laboratory testing.       Medications - No data to display    Assessments & Plan (with Medical Decision Making)   Carlos Keyes is a 52 year old male who presents with concerns for recent tick bite and subsequent body aches, fatigue, headache and lack of energy.  No fever or chills.  No visual change.  No sinus drainage or sore throat.  No change in taste or smell.  Denies chest pain or shortness of breath.  No cough.  Denies abdominal pain, nausea or vomiting.  He states the tick was quite small but did not look engorged.  He removed the tick from his right medial thigh on Sunday.  He does have a red michelle where he removed the tick but no drainage from that area.  No other skin rashes that he is noted.  His tetanus is up-to-date.  He has had 2 doses of COVID-vaccine.  On presentation patient was afebrile and vitally stable.  Not hypoxic.   Other than a rash from the bite site no significant findings on exam as above.  We did do testing for COVID, RSV and influenza which were.  I have reviewed the nursing notes.    I have reviewed the findings, diagnosis, plan and need for follow up with the patient.                   New Prescriptions    DOXYCYCLINE HYCLATE (VIBRAMYCIN) 100 MG CAPSULE    Take 1 capsule (100 mg) by mouth 2 times daily for 10 days       Final diagnoses:   Tick bite of right thigh with local reaction, initial encounter       5/11/2023   [facility] EMERGENCY DEPT     John Tran MD  05/11/23 0673

## 2023-05-11 NOTE — DISCHARGE INSTRUCTIONS
Your testing for influenza, COVID, and RSV were all negative.  With your symptoms of aches, headaches and fatigue will cover with doxycycline for suspected Lyme's exposure.  Take this as directed for 10 days.  Return if you have new symptoms or persistent symptoms after completion of antibiotics.

## 2023-05-11 NOTE — ED TRIAGE NOTES
Pt found a tick on the upper right thigh on Sunday morning, he removed it, he reports today he is just not feeling well and wants to make sure it is not from the tick bite

## 2023-09-26 ENCOUNTER — OFFICE VISIT (OUTPATIENT)
Dept: FAMILY MEDICINE | Facility: CLINIC | Age: 53
End: 2023-09-26
Payer: COMMERCIAL

## 2023-09-26 ENCOUNTER — ANCILLARY PROCEDURE (OUTPATIENT)
Dept: GENERAL RADIOLOGY | Facility: CLINIC | Age: 53
End: 2023-09-26
Attending: FAMILY MEDICINE
Payer: COMMERCIAL

## 2023-09-26 VITALS
BODY MASS INDEX: 30.34 KG/M2 | HEIGHT: 75 IN | DIASTOLIC BLOOD PRESSURE: 70 MMHG | WEIGHT: 244 LBS | TEMPERATURE: 97.7 F | OXYGEN SATURATION: 98 % | SYSTOLIC BLOOD PRESSURE: 120 MMHG | RESPIRATION RATE: 14 BRPM

## 2023-09-26 DIAGNOSIS — M25.562 ACUTE PAIN OF LEFT KNEE: Primary | ICD-10-CM

## 2023-09-26 DIAGNOSIS — M25.562 ACUTE PAIN OF LEFT KNEE: ICD-10-CM

## 2023-09-26 DIAGNOSIS — S83.412A SPRAIN OF MEDIAL COLLATERAL LIGAMENT OF LEFT KNEE, INITIAL ENCOUNTER: ICD-10-CM

## 2023-09-26 PROCEDURE — 99213 OFFICE O/P EST LOW 20 MIN: CPT | Performed by: FAMILY MEDICINE

## 2023-09-26 PROCEDURE — 73562 X-RAY EXAM OF KNEE 3: CPT | Mod: TC | Performed by: RADIOLOGY

## 2023-09-26 RX ORDER — NAPROXEN 500 MG/1
500 TABLET ORAL 2 TIMES DAILY WITH MEALS
Qty: 14 TABLET | Refills: 0 | Status: SHIPPED | OUTPATIENT
Start: 2023-09-26 | End: 2023-10-03

## 2023-09-26 ASSESSMENT — PAIN SCALES - GENERAL: PAINLEVEL: MILD PAIN (3)

## 2023-09-26 ASSESSMENT — ENCOUNTER SYMPTOMS
JOINT SWELLING: 0
MYALGIAS: 0
ACTIVITY CHANGE: 0
ARTHRALGIAS: 0

## 2023-09-26 NOTE — NURSING NOTE
"Chief Complaint   Patient presents with    Knee Pain       Initial /70   Temp 97.7  F (36.5  C) (Tympanic)   Resp 14   Ht 1.892 m (6' 2.5\")   Wt 110.7 kg (244 lb)   SpO2 98%   BMI 30.91 kg/m   Estimated body mass index is 30.91 kg/m  as calculated from the following:    Height as of this encounter: 1.892 m (6' 2.5\").    Weight as of this encounter: 110.7 kg (244 lb).    Patient presents to the clinic using No DME    Is there anyone who you would like to be able to receive your results? No  If yes have patient fill out GAVINO      "

## 2023-09-26 NOTE — PROGRESS NOTES
"  Assessment & Plan     Carlos was seen today for knee pain.    Diagnoses and all orders for this visit:    Acute pain of left knee  -     XR Knee Left 3 Views; Future  -     Physical Therapy Referral; Future  -     naproxen (NAPROSYN) 500 MG tablet; Take 1 tablet (500 mg) by mouth 2 times daily (with meals) for 7 days    Sprain of medial collateral ligament of left knee, initial encounter  -     Physical Therapy Referral; Future  -     naproxen (NAPROSYN) 500 MG tablet; Take 1 tablet (500 mg) by mouth 2 times daily (with meals) for 7 days       Pain at medial knee with positive valgus stress test, no concern for multi ligament injury at the moment.  Discussed that could possibly be medial meniscus as well but with history of physical, should be low grade. Start physical therapy trial naproxen.  Patient in agreement with plan.  If in 2 to 3 weeks pain does not improve or worsens we will place referral to orthopedics.             BMI:   Estimated body mass index is 30.91 kg/m  as calculated from the following:    Height as of this encounter: 1.892 m (6' 2.5\").    Weight as of this encounter: 110.7 kg (244 lb).           MD ALANIS Griffiths [facility]    Subjective   Carlos is a 53 year old, presenting for the following health issues:  Knee Pain        9/26/2023     9:14 AM   Additional Questions   Roomed by Georgie THAPA   Accompanied by self     Pain at the medial side of knee  Most painful at night when he lays on his side  No clicking or catching  On feet a lot of times In the day  No noticeable swelling or difference when compared to right knee  History of right meniscus tear on right s/p arthroscopy  No currently taking any medications  Able to ambulate, able to continue with daily activities  Denies weakness    History of Present Illness       Reason for visit:  Pain in left knee  Symptom onset:  3-4 weeks ago  Symptoms include:  Left knee pain and stiffness  Symptom intensity:  Mild  Symptom " "progression:  Worsening  Had these symptoms before:  No  What makes it worse:  Seeing my children or friends upset :)  What makes it better:  Lance days, walking the dog, seeing our grandsonHe consumes 0 sweetened beverage(s) daily.He exercises with enough effort to increase his heart rate 30 to 60 minutes per day.  He exercises with enough effort to increase his heart rate 3 or less days per week.   He is taking medications regularly.                 Review of Systems   Constitutional:  Negative for activity change.   Musculoskeletal:  Negative for arthralgias, joint swelling and myalgias.        See hpi            Objective    /70   Temp 97.7  F (36.5  C) (Tympanic)   Resp 14   Ht 1.892 m (6' 2.5\")   Wt 110.7 kg (244 lb)   SpO2 98%   BMI 30.91 kg/m    Body mass index is 30.91 kg/m .  Physical Exam  Vitals and nursing note reviewed.   Musculoskeletal:         General: No swelling. Normal range of motion.      Comments: Knee Exam:    knee without ecchymosis or swelling, no patellar crepitus or tenderness with compression, no tenderness with varus stress, Ant and Pos drawers negative, negative McMurrays, Tenderness along the medial joint line, Tenderness with valgus stress      Neurological:      Mental Status: He is alert.                              "

## 2023-11-14 ENCOUNTER — OFFICE VISIT (OUTPATIENT)
Dept: ORTHOPEDICS | Facility: CLINIC | Age: 53
End: 2023-11-14
Payer: COMMERCIAL

## 2023-11-14 VITALS — BODY MASS INDEX: 31.32 KG/M2 | WEIGHT: 244 LBS | HEIGHT: 74 IN

## 2023-11-14 DIAGNOSIS — M25.562 LEFT KNEE PAIN, UNSPECIFIED CHRONICITY: Primary | ICD-10-CM

## 2023-11-14 PROCEDURE — 99204 OFFICE O/P NEW MOD 45 MIN: CPT | Performed by: PEDIATRICS

## 2023-11-14 NOTE — LETTER
"    11/14/2023         RE: Carlos Keyes  [address]  [place] [address]        Dear Colleague,    Thank you for referring your patient, Carlos Keyes, to the [facility]. Please see a copy of my visit note below.    ASSESSMENT & PLAN    Carlos was seen today for injury.    Diagnoses and all orders for this visit:    Left knee pain, unspecified chronicity  -     MR Knee Left w/o Contrast; Future          See Patient Instructions  Patient Instructions   Reviewed nature of left medial knee pain.  Favor potential medial meniscus involvement, possible tear.  Other considerations could include some early degenerative change in the knee (\"arthritis\"), though not supported on recent x-rays.  Additionally, there is some pain with stressing the MCL, but without recent acute injury.  For next steps, we discussed considerations around MRI of the left knee, also physical therapy, support, use of anti-inflammatory medications, and steroid injection.  Following discussion, plan MRI of the left knee next.    Advanced imaging is done by appointment. Please call [facility] ([facility]/[place]) [phone] , [facility] ([facility]/[place]) [phone] , or [facility] ([facility]) [phone]  to schedule your MRI.     Some insurance companies may require a prior authorization to be completed which can delay the time until you are able to schedule your appointment.       If you are active on [employer], you may have access to your test results before your provider is able to review the study and advise on next steps.      The clinic will plan to contact you with results. If you have not heard from the clinic within 2-3 days following your MRI, please contact us at the number listed below.  Alternatively, if interested in further discussion with me about MRI findings and next steps, feel free to schedule a " "telephone visit, video visit, or recheck appointment in clinic.        If you have any further questions for your physician or physician s care team you can contact them thru MyChart or by calling [phone].      Bryn Montoya DO  [facility]    -----  Chief Complaint   Patient presents with     Left Knee - Injury       SUBJECTIVE  Carlos Keyes is a/an 53 year old male who is seen as a self referral for evaluation of left knee.     The patient is seen by themselves.    Onset: 4+ month(s) ago. Reports insidious onset without acute precipitating event.  Location of Pain: left knee, medial jointline, notes that pain is progressively getting worse, described as dull ache, states that having a knee on it while laying down increases soreness  Worsened by: Stairs, external and internal rotation, bending and flexing, knees touching each other  Better with: Naproxen  Treatments tried: naproxen, does have PT referral but notes that he most likely will not use this  Associated symptoms: no clear swelling    Orthopedic/Surgical history: YES - Date: Right knee arthroscopy with Dr. 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  Additional history:  + medial left knee pain at rest.  No clear joint noise.  May limp at times, especially after sitting for a bit.  + nighttime pain.  Pain with something touching medial left knee.        REVIEW OF SYSTEMS:  Review of Systems    OBJECTIVE:  Ht 1.88 m (6' 2\")   Wt 110.7 kg (244 lb)   BMI 31.33 kg/m     General: healthy, alert and in no distress  Skin: no suspicious lesions or rash.  CV: distal perfusion intact   Resp: normal respiratory effort without conversational dyspnea   Psych: normal mood and affect  Gait: mild antalgic  Neuro: Normal light sensory exam of extremity       Left Knee exam    Inspection:   No clear effusion   no ecchymosis    ROM:      Full active and " passive ROM with flexion and extension    Patellar Motion:      Normal patellar tracking noted through range of motion    Tender:      medial joint line    Non Tender:       remainder of knee area    Special Tests:      no change with Monica       neg (-) Lachman       neg (-) anterior drawer       neg (-) posterior drawer       neg (-) varus a       neg (-) valgus for laxity, but + pain       no pain with forced extension  + Apley compression          RADIOLOGY:  Final results and radiologist's interpretation, available in the [facility] health record.  Images were reviewed with the patient in the office today.  My personal interpretation of the performed imaging: No acute bony abnormality noted.  Calcification superior to the patella consistent with quadriceps enthesopathy.  Patellar view also appears to demonstrate small, chronic appearing calcification at the medial aspect of the patella.          HISTORY: Acute pain of left knee  COMPARISON: None.                                                                       IMPRESSION: Normal joint spacing and alignment. No acute fracture. No  sizable joint effusion. There is some ossification within the region  of the distal quadriceps tendon which probably relates to remote  injury.     GAYLE FOWLER MD         Review of prior external note(s) from - primary care  Review of the result(s) of each unique test - imaging  Independent interpretation of a test performed by another physician/other qualified health care professional (not separately reported) - imaging  Ordering of each unique test             Again, thank you for allowing me to participate in the care of your patient.        Sincerely,        Bryn Montoya DO

## 2023-11-14 NOTE — PROGRESS NOTES
"ASSESSMENT & PLAN    Carlos was seen today for injury.    Diagnoses and all orders for this visit:    Left knee pain, unspecified chronicity  -     MR Knee Left w/o Contrast; Future          See Patient Instructions  Patient Instructions   Reviewed nature of left medial knee pain.  Favor potential medial meniscus involvement, possible tear.  Other considerations could include some early degenerative change in the knee (\"arthritis\"), though not supported on recent x-rays.  Additionally, there is some pain with stressing the MCL, but without recent acute injury.  For next steps, we discussed considerations around MRI of the left knee, also physical therapy, support, use of anti-inflammatory medications, and steroid injection.  Following discussion, plan MRI of the left knee next.    Advanced imaging is done by appointment. Please call [facility] ([facility]/[place]) [phone] , [facility] ([facility]/[place]) [phone] , or [facility] ([facility]) [phone]  to schedule your MRI.     Some insurance companies may require a prior authorization to be completed which can delay the time until you are able to schedule your appointment.       If you are active on [employer], you may have access to your test results before your provider is able to review the study and advise on next steps.      The clinic will plan to contact you with results. If you have not heard from the clinic within 2-3 days following your MRI, please contact us at the number listed below.  Alternatively, if interested in further discussion with me about MRI findings and next steps, feel free to schedule a telephone visit, video visit, or recheck appointment in clinic.        If you have any further questions for your physician or physician s care team you can contact them thru [employer] or by calling [phone].      Bryn Montoya,   [facility]    -----  Chief Complaint   Patient presents with    Left Knee - Injury       SUBJECTIVE  Carlos Keyes is a/an 53 year old male who is seen as a self referral for evaluation of left knee.     The patient is seen by themselves.    Onset: 4+ month(s) ago. Reports insidious onset without acute precipitating event.  Location of Pain: left knee, medial jointline, notes that pain is progressively getting worse, described as dull ache, states that having a knee on it while laying down increases soreness  Worsened by: Stairs, external and internal rotation, bending and flexing, knees touching each other  Better with: Naproxen  Treatments tried: naproxen, does have PT referral but notes that he most likely will not use this  Associated symptoms: no clear swelling    Orthopedic/Surgical history: YES - Date: Right knee arthroscopy with Dr. 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  Additional history:  + medial left knee pain at rest.  No clear joint noise.  May limp at times, especially after sitting for a bit.  + nighttime pain.  Pain with something touching medial left knee.        REVIEW OF SYSTEMS:  Review of Systems    OBJECTIVE:  Ht 1.88 m (6' 2\")   Wt 110.7 kg (244 lb)   BMI 31.33 kg/m     General: healthy, alert and in no distress  Skin: no suspicious lesions or rash.  CV: distal perfusion intact   Resp: normal respiratory effort without conversational dyspnea   Psych: normal mood and affect  Gait: mild antalgic  Neuro: Normal light sensory exam of extremity       Left Knee exam    Inspection:   No clear effusion   no ecchymosis    ROM:      Full active and passive ROM with flexion and extension    Patellar Motion:      Normal patellar tracking noted through range of motion    Tender:      medial joint line    Non Tender:       remainder of knee area    Special Tests:      no change with Monica       neg (-) Lachman       " neg (-) anterior drawer       neg (-) posterior drawer       neg (-) varus a       neg (-) valgus for laxity, but + pain       no pain with forced extension  + Apley compression          RADIOLOGY:  Final results and radiologist's interpretation, available in the [facility] health record.  Images were reviewed with the patient in the office today.  My personal interpretation of the performed imaging: No acute bony abnormality noted.  Calcification superior to the patella consistent with quadriceps enthesopathy.  Patellar view also appears to demonstrate small, chronic appearing calcification at the medial aspect of the patella.          HISTORY: Acute pain of left knee  COMPARISON: None.                                                                       IMPRESSION: Normal joint spacing and alignment. No acute fracture. No  sizable joint effusion. There is some ossification within the region  of the distal quadriceps tendon which probably relates to remote  injury.     GAYLE FOWLER MD         Review of prior external note(s) from - primary care  Review of the result(s) of each unique test - imaging  Independent interpretation of a test performed by another physician/other qualified health care professional (not separately reported) - imaging  Ordering of each unique test

## 2023-11-14 NOTE — PATIENT INSTRUCTIONS
"Reviewed nature of left medial knee pain.  Favor potential medial meniscus involvement, possible tear.  Other considerations could include some early degenerative change in the knee (\"arthritis\"), though not supported on recent x-rays.  Additionally, there is some pain with stressing the MCL, but without recent acute injury.  For next steps, we discussed considerations around MRI of the left knee, also physical therapy, support, use of anti-inflammatory medications, and steroid injection.  Following discussion, plan MRI of the left knee next.    Advanced imaging is done by appointment. Please call [facility] ([facility]/[place]) [phone] , [facility] ([facility]/[place]) [phone] , or [facility] ([facility]) [phone]  to schedule your MRI.     Some insurance companies may require a prior authorization to be completed which can delay the time until you are able to schedule your appointment.       If you are active on [employer], you may have access to your test results before your provider is able to review the study and advise on next steps.      The clinic will plan to contact you with results. If you have not heard from the clinic within 2-3 days following your MRI, please contact us at the number listed below.  Alternatively, if interested in further discussion with me about MRI findings and next steps, feel free to schedule a telephone visit, video visit, or recheck appointment in clinic.        If you have any further questions for your physician or physician s care team you can contact them thru [employer] or by calling [phone].    "

## 2023-12-04 ENCOUNTER — HOSPITAL ENCOUNTER (OUTPATIENT)
Dept: MRI IMAGING | Facility: CLINIC | Age: 53
Discharge: HOME | End: 2023-12-04
Attending: PEDIATRICS | Admitting: PEDIATRICS
Payer: COMMERCIAL

## 2023-12-04 DIAGNOSIS — M25.562 LEFT KNEE PAIN, UNSPECIFIED CHRONICITY: ICD-10-CM

## 2023-12-04 PROCEDURE — 73721 MRI JNT OF LWR EXTRE W/O DYE: CPT | Mod: 26 | Performed by: RADIOLOGY

## 2023-12-04 PROCEDURE — 73721 MRI JNT OF LWR EXTRE W/O DYE: CPT | Mod: LT

## 2023-12-08 ENCOUNTER — MYC MEDICAL ADVICE (OUTPATIENT)
Dept: ORTHOPEDICS | Facility: CLINIC | Age: 53
End: 2023-12-08
Payer: COMMERCIAL

## 2023-12-08 DIAGNOSIS — S83.242A TEAR OF MEDIAL MENISCUS OF LEFT KNEE, CURRENT, UNSPECIFIED TEAR TYPE, INITIAL ENCOUNTER: Primary | ICD-10-CM

## 2023-12-09 NOTE — TELEPHONE ENCOUNTER
Results for orders placed or performed during the hospital encounter of 12/04/23   MR Knee Left w/o Contrast    Narrative    MR knee without contrast 12/5/2023 7:45 AM    Techniques: Multiplanar multisequence imaging of the knee was obtained  without administration of intra-articular or intravenous contrast  using routing protocol.    History: evaluate medial knee pain, medial meniscus; Left knee pain,  unspecified chronicity    Comparison: Left knee radiograph 9/26/2023    FINDINGS:    MENISCI:  Medial meniscus: Horizontal-oblique tear of the medial meniscal  posterior root, extending to the posterior root body junction,  involving the tibial articular surface.    Lateral meniscus: Intact.    LIGAMENTS  Cruciate ligaments: Intact.  Medial supporting structures: Intact.  Lateral supporting structures: Intact.    EXTENSOR MECHANISM  Intact.    FLUID  Small knee effusion. No substantial Baker cyst.    OSSEOUS and ARTICULAR STRUCTURES  Bones: No fracture or contusion. Small 9 mm, T2 hyperintense, T1  hypointense, well-demarcated lesion in the distal medial femoral  metadiaphysis, favored to represent enchondroma.    Patellofemoral compartment: No hyaline cartilage disease.    Medial compartment: Mild uniform hyaline cartilage thinning throughout  the medial compartment, with associated mild joint space narrowing    Lateral compartment: No hyaline cartilage disease.      Impression    IMPRESSION:  1.  Horizontal-oblique tear of the body and posterior horn of the left  knee medial meniscus.  2.  Focal area of grade 4 cartilage fissuring with subchondral edema  along the medial patellar facet with grade 2/3 cartilage loss along  the medial trochlear surface. Mild diffuse cartilage thinning in the  medial femorotibial joint compartment without full-thickness cartilage  loss or subchondral edema  3.  Small left knee knee effusion.  4.  The anterior and posterior cruciate ligaments, medial and lateral  supporting structures,  and lateral meniscus are intact.    I have personally reviewed the examination and initial interpretation  and I agree with the findings.    LAURA POWELL MD         SYSTEM ID:  [id number]

## 2023-12-11 ENCOUNTER — TELEPHONE (OUTPATIENT)
Dept: ORTHOPEDICS | Facility: CLINIC | Age: 53
End: 2023-12-11
Payer: COMMERCIAL

## 2023-12-11 NOTE — TELEPHONE ENCOUNTER
"MRI shows medial meniscus tearing, along with mild articular cartilage thinning (degenerative change, or some arthritis) in the medial (inner) compartment and small area in the patellofemoral (kneecap) compartment. See report for specifics.  Cartilage issues will not \"heal\" on their own, but can calm down over time.  Some options: 1) trial physical therapy (improve function, pain); 2) trial steroid injection (goal of pain relief); 3) referral to discuss further with orthopedic surgery, given the tear present (discussion about whether surgery might be considered, and if so, what to expect).  If interested in PT, can place referral, and monitor 1 month with PT.  If interested in injection, can schedule recheck appointment.  If desiring referral to ortho surgery, can place. Based on message, it appears that is the interest, so can go ahead and place referral. Referral has been pended.  I would be happy to have a visit with the patient (in person, by video, or by phone) to discuss further if that would be helpful.  Thanks.  Bryn Montoya DO, CASAMUEL    "

## 2023-12-11 NOTE — TELEPHONE ENCOUNTER
Responded to patient mychart from 12/8/23. Patient is in understanding with plan. Will follow up with ortho surgery.    GABRIELA Trimble

## 2023-12-11 NOTE — TELEPHONE ENCOUNTER
[facility] Call Center    Phone Message    May a detailed message be left on voicemail: yes     Reason for Call: Other: patient calling today. He would like the results of his MRI and would like to schedule surgery. He why he needs to have the appt tomorrow?      Action Taken: Other: TE     Travel Screening: Not Applicable

## 2023-12-13 NOTE — PROGRESS NOTES
CHIEF COMPLAINT:   Chief Complaint   Patient presents with    Left Knee - Pain     Left medial knee pain. Onset: 8/2023 with NKI. Getting worse. He's ok if walking on flat even ground, but any deviation will give sharp pain. No treatment. Had right knee scope with Dr Basilio in 2018, this feels similar. He has never had a cortisone injection.        Carlos Keyes is seen today in the [facility] for evaluation of left knee pain [facility]         HISTORY OF PRESENT ILLNESS    Carlos Keyes is a 53 year old male seen for evaluation of ongoing left knee pain with no known injury.   Pain has been present for 4 months, 8/2023. Pain is getting worse, located along the inner aspect. Pain is aggravated walking on uneven ground, twisting, knees touching at night, driving, sitting too long in a continuous position. Otherwise ok on flat grounds. No recent treatment other than tylenol.    No swelling.    Prior right knee arthroscopy with Dr Basilio in 2018, and feels this pain is similar.    Denies hip or low back pain, numbness and tingling.    Present symptoms: pain medially , pain sharp, shooting, dull/achy , moderate pain, no swelling, no catching/popping, no locking, no giving way.    Pain severity: 5/10  Frequency of symptoms: are constant  Exacerbating Factors: weight bearing, stairs, [id number], prlonged sitting, twisting  Relieving Factors: sitting and moving the knee  Night Pain: Yes  Pain while at rest: Yes   Numbness or tingling: No   Patient has tried:     NSAIDS: No      Physical Therapy: No      Activity modification: Yes      Bracing: No      Injections: No      Ice: No      Assistive device:  No     Other: tylennol      Other PMH:  has a past medical history of Complex tear of medial meniscus of right knee as current injury, initial encounter (8/7/2018) and Toe pain (1/29/2009).  Patient Active Problem List   Diagnosis    HYPERLIPIDEMIA LDL GOAL  "<130    Family history of ischemic heart disease    S/P arthroscopy of right knee       Surgical Hx:  has a past surgical history that includes vasectomy (Bilateral, 05/06/05); fracture tx, ankle rt/lt; hernia repair, inguinal rt/lt (10/13/2006); Repair hammer toe (10/14/2011); Open reduction internal fixation forearm (Right, 11/5/2014); Arthroscopy knee with debridement joint, combined (Right, 7/25/2018); and Colonoscopy (N/A, 7/16/2019).    Medications:   Current Outpatient Medications:     allopurinol (ZYLOPRIM) 100 MG tablet, TAKE ONE TABLET BY MOUTH ONCE DAILY, Disp: 90 tablet, Rfl: 3    PFIZER-BIONTECH COVID-19 VACC 30 MCG/0.3ML injection, , Disp: , Rfl:     simvastatin (ZOCOR) 20 MG tablet, TAKE ONE TABLET BY MOUTH AT BEDTIME, Disp: 90 tablet, Rfl: 3    Allergies:   Allergies   Allergen Reactions    No Known Drug Allergy        Social Hx: auto repair.   reports that he has never smoked. His smokeless tobacco use includes chew. He reports current alcohol use. He reports that he does not use drugs.    Family Hx: family history includes C.A.D. in his maternal grandfather and mother.    REVIEW OF SYSTEMS: 10 point ROS neg other than the symptoms noted above in the HPI and PMH. Notables include  CONSTITUTIONAL:NEGATIVE for fever, chills, change in weight  INTEGUMENTARY/SKIN: NEGATIVE for worrisome rashes, moles or lesions  MUSCULOSKELETAL:See HPI above  NEURO: NEGATIVE for weakness, dizziness or paresthesias    PHYSICAL EXAM:  Ht 1.905 m (6' 3\")   Wt 112.9 kg (249 lb)   BMI 31.12 kg/m     GENERAL APPEARANCE: healthy, alert, no distress  SKIN: no suspicious lesions or rashes  NEURO: Normal strength and tone, mentation intact and speech normal  PSYCH:  mentation appears normal and affect normal, not anxious  RESPIRATORY: No increased work of breathing.  HANDS: no clubbing, nail pitting  LYMPH: no palpable popliteal lymphadenopathy.    BILATERAL LOWER EXTREMITIES:  Gait: mild favors the left.  Alignment: varus  No " gross deformities or masses.  No Quad atrophy, strength normal.  Intact sensation deep peroneal nerve, superficial peroneal nerve, med/lat tibial nerve, sural nerve, saphenous nerve  Intact EHL, EDL, TA, FHL, GS, quadriceps hamstrings and hip flexors  Bilateral calf soft and nttp or squeeze.  DTRs: achilles 2+, patella 2+.  Edema: trace    LEFT KNEE EXAM:    Skin: intact, no ecchymosis or erythema  ROM: slight hyper extension to 130 flexion, limited by medial discomfort in both flexion and extension  Tight hamstrings on straight leg raise.  Effusion: none  Tender: severe at medial joint line, mild at the pes   Nontender to palpation lateral joint line, anterior or posterior knee  McMurrays: positive medially for pain.  Positive apleys compression test medially.    MCL: stable, and non-painful at both 0 and 30 degrees knee flexion  Varus stress: stable, and non-painful at both 0 and 30 degrees knee flexion  Lachmans: neg, firm endpoint  Posterior Drawer stable  Patellofemoral joint:                Apprehension: negative              Crepitations: mild   Grind: mild positive.    RIGHT KNEE EXAM:    Skin: intact, no ecchymosis or erythema  ROM: slight hyper extension to 135 flexion  Tight hamstrings on straight leg raise.  Effusion: none  Tender: NTTP med/lat joint line, anterior or posterior knee  McMurrays: negative    MCL: stable, and non-painful at both 0 and 30 degrees knee flexion  Varus stress: stable, and non-painful at both 0 and 30 degrees knee flexion  Lachmans: neg, firm endpoint  Posterior Drawer stable  Patellofemoral joint:                Apprehension: negative              Crepitations: mild    X-RAY:  3 views left knee from 9/26/2023 were reviewed in clinic today. On my review, no obvious fractures or dislocations. Normal joint spacing and alignment. No acute fracture. No sizable joint effusion. There is some ossification within the region of the distal quadriceps tendon.    MRI:  MRI left knee from  12/4/2023 was reviewed in clinic today.  1.  Horizontal-oblique tear of the body and posterior horn of the left knee medial meniscus.  2.  Focal area of grade 4 cartilage fissuring with subchondral edema  along the medial patellar facet with grade 2/3 cartilage loss along  the medial trochlear surface. Mild diffuse cartilage thinning in the  medial femorotibial joint compartment without full-thickness cartilage  loss or subchondral edema  3.  Small left knee knee effusion.  4.  The anterior and posterior cruciate ligaments, medial and lateral  supporting structures, and lateral meniscus are intact.         ASSESSMENT/PLAN: Carlos Keyes is a 53 year old male with chronic left knee pain, complex medial meniscus tear.     * discussed exam and imaging findings with them, what appears to be a complex medial meniscal tear on MRI, as well as some underlying arthritic changes, which is consistent with symptoms and physical examination findings.     * Discussed treatment options including nonoperative treatment with continued rest, ice, elevation, activity modification, NSAIDS and Physical Therapy, bracing and potential injections versus surgical treatment with arthroscopy and meniscal repair versus debridement, possible chondral debridement. Risks and benefits of each discussed in detail.  * in the setting of underlying chondrosis, predictability of arthroscopy is uncertain, unless mechanical symptoms present due to the meniscus tear.    * surgical risks discussed: bleeding, infection, pain, scar, damage to adjacent structures (nerve, vessels, cartilage), stiffness, post-traumatic arthritis, failure to relieve symptoms, recurrence of symptoms, blood clots (DVT), pulmonary emolism, risks of anesthesia and death. This surgery is not intended nor expected to alleviate arthritic pain symptoms, nor will it treat or correct underlying arthritic changes. Arthritis and symptoms related to arthritis could worsen with arthroscopy  and meniscal and/or chondral debridement. Patient understands.    * understanding the risks of surgery, as a quality of life decision, they elected to proceed with arthroscopy  * plan: LEFT knee arthroscopy with partial meniscal debridement, possible chondral debridement, outpatient surgery  * will arrange at a time in the near future at a mutual convenience  * will need H+P from PCP prior to surgery  * they will be on ASA x2 weeks postoperative for DVT prophylaxis, as well as use of  crutches  * plan Physical Therapy to start 2 weeks postoperative, to be determined at postoperative visit.  * return to clinic 2 week postop for wound check, sooner as needed.  * all questions addressed and answered prior to discharge from clinic today.  * they will call if any questions or concerns in the meantime.    * All questions were addressed and answered prior to discharge from clinic today. The patient acknowledges an understanding of and agreement with the plan set forth during today's visit. Patient was advised to call our office or MyChart us if any further questions arise upon leaving our office today.         Carlos Barrientos M.D., M.S.  Dept. of Orthopaedic Surgery  [facility]

## 2023-12-14 ENCOUNTER — TELEPHONE (OUTPATIENT)
Dept: SURGERY | Facility: CLINIC | Age: 53
End: 2023-12-14

## 2023-12-14 ENCOUNTER — OFFICE VISIT (OUTPATIENT)
Dept: ORTHOPEDICS | Facility: CLINIC | Age: 53
End: 2023-12-14
Attending: PEDIATRICS
Payer: COMMERCIAL

## 2023-12-14 VITALS — BODY MASS INDEX: 30.96 KG/M2 | HEIGHT: 75 IN | WEIGHT: 249 LBS

## 2023-12-14 DIAGNOSIS — S83.242A TEAR OF MEDIAL MENISCUS OF LEFT KNEE, CURRENT, UNSPECIFIED TEAR TYPE, INITIAL ENCOUNTER: ICD-10-CM

## 2023-12-14 DIAGNOSIS — G89.29 CHRONIC PAIN OF LEFT KNEE: Primary | ICD-10-CM

## 2023-12-14 DIAGNOSIS — M25.562 CHRONIC PAIN OF LEFT KNEE: Primary | ICD-10-CM

## 2023-12-14 PROCEDURE — 99244 OFF/OP CNSLTJ NEW/EST MOD 40: CPT | Performed by: ORTHOPAEDIC SURGERY

## 2023-12-14 ASSESSMENT — PAIN SCALES - GENERAL: PAINLEVEL: MODERATE PAIN (4)

## 2023-12-14 NOTE — LETTER
12/14/2023         RE: Carlos Keyes  [address]  [facility] [address]        Dear Colleague,    Thank you for referring your patient, Carlos Keyes, to the [facility] [place]. Please see a copy of my visit note below.    CHIEF COMPLAINT:   Chief Complaint   Patient presents with     Left Knee - Pain     Left medial knee pain. Onset: 8/2023 with NKI. Getting worse. He's ok if walking on flat even ground, but any deviation will give sharp pain. No treatment. Had right knee scope with Dr Basilio in 2018, this feels similar. He has never had a cortisone injection.        Carlos Keyes is seen today in the [facility] for evaluation of left knee pain at the request of Dr. Bryn Montoya         HISTORY OF PRESENT ILLNESS    Carlos Keyes is a 53 year old male seen for evaluation of ongoing left knee pain with no known injury.   Pain has been present for 4 months, 8/2023. Pain is getting worse, located along the inner aspect. Pain is aggravated walking on uneven ground, twisting, knees touching at night, driving, sitting too long in a continuous position. Otherwise ok on flat grounds. No recent treatment other than tylenol.    No swelling.    Prior right knee arthroscopy with Dr Basilio in 2018, and feels this pain is similar.    Denies hip or low back pain, numbness and tingling.    Present symptoms: pain medially , pain sharp, shooting, dull/achy , moderate pain, no swelling, no catching/popping, no locking, no giving way.    Pain severity: 5/10  Frequency of symptoms: are constant  Exacerbating Factors: weight bearing, stairs, [id number], prlonged sitting, twisting  Relieving Factors: sitting and moving the knee  Night Pain: Yes  Pain while at rest: Yes   Numbness or tingling: No   Patient has tried:     NSAIDS: No      Physical Therapy: No      Activity modification: Yes      Bracing: No      Injections: No      Ice: No      Assistive device:   "No     Other: tylennol      Other PMH:  has a past medical history of Complex tear of medial meniscus of right knee as current injury, initial encounter (8/7/2018) and Toe pain (1/29/2009).  Patient Active Problem List   Diagnosis     HYPERLIPIDEMIA LDL GOAL <130     Family history of ischemic heart disease     S/P arthroscopy of right knee       Surgical Hx:  has a past surgical history that includes vasectomy (Bilateral, 05/06/05); fracture tx, ankle rt/lt; hernia repair, inguinal rt/lt (10/13/2006); Repair hammer toe (10/14/2011); Open reduction internal fixation forearm (Right, 11/5/2014); Arthroscopy knee with debridement joint, combined (Right, 7/25/2018); and Colonoscopy (N/A, 7/16/2019).    Medications:   Current Outpatient Medications:      allopurinol (ZYLOPRIM) 100 MG tablet, TAKE ONE TABLET BY MOUTH ONCE DAILY, Disp: 90 tablet, Rfl: 3     PFIZER-SightCall COVID-19 VACC 30 MCG/0.3ML injection, , Disp: , Rfl:      simvastatin (ZOCOR) 20 MG tablet, TAKE ONE TABLET BY MOUTH AT BEDTIME, Disp: 90 tablet, Rfl: 3    Allergies:   Allergies   Allergen Reactions     No Known Drug Allergy        Social Hx: auto repair.   reports that he has never smoked. His smokeless tobacco use includes chew. He reports current alcohol use. He reports that he does not use drugs.    Family Hx: family history includes C.A.D. in his maternal grandfather and mother.    REVIEW OF SYSTEMS: 10 point ROS neg other than the symptoms noted above in the HPI and PMH. Notables include  CONSTITUTIONAL:NEGATIVE for fever, chills, change in weight  INTEGUMENTARY/SKIN: NEGATIVE for worrisome rashes, moles or lesions  MUSCULOSKELETAL:See HPI above  NEURO: NEGATIVE for weakness, dizziness or paresthesias    PHYSICAL EXAM:  Ht 1.905 m (6' 3\")   Wt 112.9 kg (249 lb)   BMI 31.12 kg/m     GENERAL APPEARANCE: healthy, alert, no distress  SKIN: no suspicious lesions or rashes  NEURO: Normal strength and tone, mentation intact and speech normal  PSYCH:  " mentation appears normal and affect normal, not anxious  RESPIRATORY: No increased work of breathing.  HANDS: no clubbing, nail pitting  LYMPH: no palpable popliteal lymphadenopathy.    BILATERAL LOWER EXTREMITIES:  Gait: mild favors the left.  Alignment: varus  No gross deformities or masses.  No Quad atrophy, strength normal.  Intact sensation deep peroneal nerve, superficial peroneal nerve, med/lat tibial nerve, sural nerve, saphenous nerve  Intact EHL, EDL, TA, FHL, GS, quadriceps hamstrings and hip flexors  Bilateral calf soft and nttp or squeeze.  DTRs: achilles 2+, patella 2+.  Edema: trace    LEFT KNEE EXAM:    Skin: intact, no ecchymosis or erythema  ROM: slight hyper extension to 130 flexion, limited by medial discomfort in both flexion and extension  Tight hamstrings on straight leg raise.  Effusion: none  Tender: severe at medial joint line, mild at the pes   Nontender to palpation lateral joint line, anterior or posterior knee  McMurrays: positive medially for pain.  Positive apleys compression test medially.    MCL: stable, and non-painful at both 0 and 30 degrees knee flexion  Varus stress: stable, and non-painful at both 0 and 30 degrees knee flexion  Lachmans: neg, firm endpoint  Posterior Drawer stable  Patellofemoral joint:                Apprehension: negative              Crepitations: mild   Grind: mild positive.    RIGHT KNEE EXAM:    Skin: intact, no ecchymosis or erythema  ROM: slight hyper extension to 135 flexion  Tight hamstrings on straight leg raise.  Effusion: none  Tender: NTTP med/lat joint line, anterior or posterior knee  McMurrays: negative    MCL: stable, and non-painful at both 0 and 30 degrees knee flexion  Varus stress: stable, and non-painful at both 0 and 30 degrees knee flexion  Lachmans: neg, firm endpoint  Posterior Drawer stable  Patellofemoral joint:                Apprehension: negative              Crepitations: mild    X-RAY:  3 views left knee from 9/26/2023 were  reviewed in clinic today. On my review, no obvious fractures or dislocations. Normal joint spacing and alignment. No acute fracture. No sizable joint effusion. There is some ossification within the region of the distal quadriceps tendon.    MRI:  MRI left knee from 12/4/2023 was reviewed in clinic today.  1.  Horizontal-oblique tear of the body and posterior horn of the left knee medial meniscus.  2.  Focal area of grade 4 cartilage fissuring with subchondral edema  along the medial patellar facet with grade 2/3 cartilage loss along  the medial trochlear surface. Mild diffuse cartilage thinning in the  medial femorotibial joint compartment without full-thickness cartilage  loss or subchondral edema  3.  Small left knee knee effusion.  4.  The anterior and posterior cruciate ligaments, medial and lateral  supporting structures, and lateral meniscus are intact.         ASSESSMENT/PLAN: Carlos Keyes is a 53 year old male with chronic left knee pain, complex medial meniscus tear.     * discussed exam and imaging findings with them, what appears to be a complex medial meniscal tear on MRI, as well as some underlying arthritic changes, which is consistent with symptoms and physical examination findings.     * Discussed treatment options including nonoperative treatment with continued rest, ice, elevation, activity modification, NSAIDS and Physical Therapy, bracing and potential injections versus surgical treatment with arthroscopy and meniscal repair versus debridement, possible chondral debridement. Risks and benefits of each discussed in detail.  * in the setting of underlying chondrosis, predictability of arthroscopy is uncertain, unless mechanical symptoms present due to the meniscus tear.    * surgical risks discussed: bleeding, infection, pain, scar, damage to adjacent structures (nerve, vessels, cartilage), stiffness, post-traumatic arthritis, failure to relieve symptoms, recurrence of symptoms, blood clots (DVT),  pulmonary emolism, risks of anesthesia and death. This surgery is not intended nor expected to alleviate arthritic pain symptoms, nor will it treat or correct underlying arthritic changes. Arthritis and symptoms related to arthritis could worsen with arthroscopy and meniscal and/or chondral debridement. Patient understands.    * understanding the risks of surgery, as a quality of life decision, they elected to proceed with arthroscopy  * plan: LEFT knee arthroscopy with partial meniscal debridement, possible chondral debridement, outpatient surgery  * will arrange at a time in the near future at a mutual convenience  * will need H+P from PCP prior to surgery  * they will be on ASA x2 weeks postoperative for DVT prophylaxis, as well as use of  crutches  * plan Physical Therapy to start 2 weeks postoperative, to be determined at postoperative visit.  * return to clinic 2 week postop for wound check, sooner as needed.  * all questions addressed and answered prior to discharge from clinic today.  * they will call if any questions or concerns in the meantime.    * All questions were addressed and answered prior to discharge from clinic today. The patient acknowledges an understanding of and agreement with the plan set forth during today's visit. Patient was advised to call our office or MyChart us if any further questions arise upon leaving our office today.         Carlos Barrientos M.D., M.S.  Dept. of Orthopaedic Surgery  [facility]           Again, thank you for allowing me to participate in the care of your patient.        Sincerely,        Carlos Barrientos MD

## 2023-12-15 ENCOUNTER — OFFICE VISIT (OUTPATIENT)
Dept: FAMILY MEDICINE | Facility: CLINIC | Age: 53
End: 2023-12-15
Payer: COMMERCIAL

## 2023-12-15 VITALS
SYSTOLIC BLOOD PRESSURE: 112 MMHG | TEMPERATURE: 97 F | BODY MASS INDEX: 31.49 KG/M2 | RESPIRATION RATE: 16 BRPM | HEART RATE: 98 BPM | WEIGHT: 245.4 LBS | HEIGHT: 74 IN | OXYGEN SATURATION: 97 % | DIASTOLIC BLOOD PRESSURE: 84 MMHG

## 2023-12-15 DIAGNOSIS — S83.242D OTHER TEAR OF MEDIAL MENISCUS OF LEFT KNEE AS CURRENT INJURY, SUBSEQUENT ENCOUNTER: ICD-10-CM

## 2023-12-15 DIAGNOSIS — M1A.09X0 IDIOPATHIC CHRONIC GOUT OF MULTIPLE SITES WITHOUT TOPHUS: ICD-10-CM

## 2023-12-15 DIAGNOSIS — E78.5 HYPERLIPIDEMIA LDL GOAL <130: ICD-10-CM

## 2023-12-15 DIAGNOSIS — Z01.818 PREOP GENERAL PHYSICAL EXAM: Primary | ICD-10-CM

## 2023-12-15 PROCEDURE — 99214 OFFICE O/P EST MOD 30 MIN: CPT | Performed by: INTERNAL MEDICINE

## 2023-12-15 ASSESSMENT — PAIN SCALES - GENERAL: PAINLEVEL: MODERATE PAIN (5)

## 2023-12-15 NOTE — PROGRESS NOTES
[facility]  [address] [place] [address]  Phone: [phone]  Primary Provider: Darek Marks  Pre-op Performing Provider: KRYS GUNTER      PREOPERATIVE EVALUATION:  Today's date: 12/15/2023    Carlos is a 53 year old, presenting for the following:  Pre-Op Exam and Health Maintenance (No shots )        12/15/2023     7:24 AM   Additional Questions   Roomed by urmila mathur   Accompanied by self         12/15/2023     7:24 AM   Patient Reported Additional Medications   Patient reports taking the following new medications none       Surgical Information:  Surgery/Procedure: ARTHROSCOPY, KNEE meniscal and chondral debridement   Surgery Location: [place]  Surgeon: Carlos Barrientos MD   Surgery Date: 12/19/23  Time of Surgery: 4:40 PM   Where patient plans to recover: At home with family  Fax number for surgical facility: Note does not need to be faxed, will be available electronically in Epic.    Assessment & Plan     The proposed surgical procedure is considered INTERMEDIATE risk.    Problem List Items Addressed This Visit       HYPERLIPIDEMIA LDL GOAL <130    Idiopathic chronic gout of multiple sites without tophus     Other Visit Diagnoses       Preop general physical exam    -  Primary    Other tear of medial meniscus of left knee as current injury, subsequent encounter                        - No identified additional risk factors other than previously addressed    Antiplatelet or Anticoagulation Medication Instructions:   - Patient is on no antiplatelet or anticoagulation medications.    Additional Medication Instructions:  Patient is to take all scheduled medications on the day of surgery    RECOMMENDATION:  APPROVAL GIVEN to proceed with proposed procedure, without further diagnostic evaluation.            Subjective       HPI related to upcoming procedure: meniscal tear needing surgical management         12/14/2023     4:53 PM   Preop Questions   1. Have  you ever had a heart attack or stroke? No   2. Have you ever had surgery on your heart or blood vessels, such as a stent placement, a coronary artery bypass, or surgery on an artery in your head, neck, heart, or legs? No   3. Do you have chest pain with activity? No   4. Do you have a history of  heart failure? No   5. Do you currently have a cold, bronchitis or symptoms of other infection? No   6. Do you have a cough, shortness of breath, or wheezing? No   7. Do you or anyone in your family have previous history of blood clots? No   8. Do you or does anyone in your family have a serious bleeding problem such as prolonged bleeding following surgeries or cuts? No   9. Have you ever had problems with anemia or been told to take iron pills? No   10. Have you had any abnormal blood loss such as black, tarry or bloody stools? No   11. Have you ever had a blood transfusion? No   12. Are you willing to have a blood transfusion if it is medically needed before, during, or after your surgery? Yes   13. Have you or any of your relatives ever had problems with anesthesia? No   14. Do you have sleep apnea, excessive snoring or daytime drowsiness? No   15. Do you have any artifical heart valves or other implanted medical devices like a pacemaker, defibrillator, or continuous glucose monitor? No   16. Do you have artificial joints? No   17. Are you allergic to latex? No       Health Care Directive:  Patient does not have a Health Care Directive or Living Will: Patient states has Advance Directive and will bring in a copy to clinic.    Preoperative Review of :   reviewed - no record of controlled substances prescribed.      Status of Chronic Conditions:  See problem list for active medical problems.  Problems all longstanding and stable, except as noted/documented.  See ROS for pertinent symptoms related to these conditions.    HYPERLIPIDEMIA - Patient has a long history of significant Hyperlipidemia requiring medication for  treatment with recent good control. Patient reports no problems or side effects with the medication.     Review of Systems  CONSTITUTIONAL: NEGATIVE for fever, chills, change in weight  INTEGUMENTARY/SKIN: NEGATIVE for worrisome rashes, moles or lesions  EYES: NEGATIVE for vision changes or irritation  ENT/MOUTH: NEGATIVE for ear, mouth and throat problems  RESP: NEGATIVE for significant cough or SOB  CV: NEGATIVE for chest pain, palpitations or peripheral edema  GI: NEGATIVE for nausea, abdominal pain, heartburn, or change in bowel habits  : NEGATIVE for frequency, dysuria, or hematuria  MUSCULOSKELETAL: NEGATIVE for significant arthralgias or myalgia  NEURO: NEGATIVE for weakness, dizziness or paresthesias  ENDOCRINE: NEGATIVE for temperature intolerance, skin/hair changes  HEME: NEGATIVE for bleeding problems  PSYCHIATRIC: NEGATIVE for changes in mood or affect    Patient Active Problem List    Diagnosis Date Noted    Idiopathic chronic gout of multiple sites without tophus 12/15/2023     Priority: Medium    S/P arthroscopy of right knee 2018     Priority: Medium    Family history of ischemic heart disease 2015     Priority: Medium     Mom  age 55      HYPERLIPIDEMIA LDL GOAL <130 10/31/2010     Priority: Medium      Past Medical History:   Diagnosis Date    Complex tear of medial meniscus of right knee as current injury, initial encounter 2018    Toe pain 2009     Past Surgical History:   Procedure Laterality Date    ABDOMEN SURGERY      hernia    ARTHROSCOPY KNEE WITH DEBRIDEMENT JOINT, COMBINED Right 2018    Procedure: ARTHROSCOPY KNEE WITH DEBRIDEMENT JOINT;  Right knee arthroscopy with medial meniscus debridement;  Surgeon: Seth Basilio MD;  Location: [facility]    COLONOSCOPY N/A 2019    Procedure: COLONOSCOPY;  Surgeon: Tyler Miles DO;  Location: [facility]    FRACTURE TX, ANKLE RT/LT      Fracture TX Ankle RT/LT    HERNIA REPAIR, INGUINAL RT/LT   "10/13/2006    Laparoscopic left inguinal hernia repair.    OPEN REDUCTION INTERNAL FIXATION FOREARM Right 11/05/2014    Procedure: OPEN REDUCTION INTERNAL FIXATION FOREARM;  Surgeon: Seth Basilio MD;  Location: [facility]    REPAIR HAMMER TOE  10/14/2011    Procedure:REPAIR HAMMER TOE; Correction Toes 4th,5th Toe Right Foot; Surgeon:ASIF CRUM; Location:[facility]    VASECTOMY Bilateral 05/06/2005     Current Outpatient Medications   Medication Sig Dispense Refill    allopurinol (ZYLOPRIM) 100 MG tablet TAKE ONE TABLET BY MOUTH ONCE DAILY 90 tablet 3    simvastatin (ZOCOR) 20 MG tablet TAKE ONE TABLET BY MOUTH AT BEDTIME 90 tablet 3       Allergies   Allergen Reactions    No Known Drug Allergy         Social History     Tobacco Use    Smoking status: Never    Smokeless tobacco: Former     Types: Chew     Quit date: 1/1/2015   Substance Use Topics    Alcohol use: Yes     Comment: daily 2 drinks     Family History   Problem Relation Age of Onset    C.A.D. Mother     Hyperlipidemia Mother     C.A.D. Maternal Grandfather     Hyperlipidemia Father     Diabetes No family hx of     Cancer - colorectal No family hx of      History   Drug Use No         Objective     /84 (BP Location: Right arm, Patient Position: Sitting, Cuff Size: Adult Large)   Pulse 98   Temp 97  F (36.1  C) (Tympanic)   Resp 16   Ht 1.88 m (6' 2.02\")   Wt 111.3 kg (245 lb 6.4 oz)   SpO2 97%   BMI 31.49 kg/m      Physical Exam    GENERAL APPEARANCE: healthy, alert and no distress     EYES: EOMI,  PERRL     HENT: ear canals and TM's normal and nose and mouth without ulcers or lesions     NECK: no adenopathy, no asymmetry, masses, or scars and thyroid normal to palpation     RESP: lungs clear to auscultation - no rales, rhonchi or wheezes     CV: regular rates and rhythm, normal S1 S2, no S3 or S4 and no murmur, click or rub     ABDOMEN:  soft, nontender, no HSM or masses and bowel sounds normal     MS: extremities normal- no gross " deformities noted, no evidence of inflammation in joints, FROM in all extremities.     SKIN: no suspicious lesions or rashes     NEURO: Normal strength and tone, sensory exam grossly normal, mentation intact and speech normal     PSYCH: mentation appears normal. and affect normal/bright     LYMPHATICS: No cervical adenopathy    Recent Labs   Lab Test 02/09/23  0854 04/14/22  1251   HGB  --  14.3   PLT  --  190    134   POTASSIUM 4.5 4.0   CR 0.92 0.88        Diagnostics:  No labs were ordered during this visit.   No EKG required, no history of coronary heart disease, significant arrhythmia, peripheral arterial disease or other structural heart disease.    Revised Cardiac Risk Index (RCRI):  The patient has the following serious cardiovascular risks for perioperative complications:   - No serious cardiac risks = 0 points     RCRI Interpretation: 0 points: Class I (very low risk - 0.4% complication rate)         Signed Electronically by: Armida Neal DO  Copy of this evaluation report is provided to requesting physician.

## 2023-12-15 NOTE — PATIENT INSTRUCTIONS
Preparing for Your Surgery  Getting started  A nurse will call you to review your health history and instructions. They will give you an arrival time based on your scheduled surgery time. Please be ready to share:  Your doctor's clinic name and phone number  Your medical, surgical, and anesthesia history  A list of allergies and sensitivities  A list of medicines, including herbal treatments and over-the-counter drugs  Whether the patient has a legal guardian (ask how to send us the papers in advance)  Please tell us if you're pregnant--or if there's any chance you might be pregnant. Some surgeries may injure a fetus (unborn baby), so they require a pregnancy test. Surgeries that are safe for a fetus don't always need a test, and you can choose whether to have one.   If you have a child who's having surgery, please ask for a copy of Preparing for Your Child's Surgery.    Preparing for surgery  Within 10 to 30 days of surgery: Have a pre-op exam (sometimes called an H&P, or History and Physical). This can be done at a clinic or pre-operative center.  If you're having a , you may not need this exam. Talk to your care team.  At your pre-op exam, talk to your care team about all medicines you take. If you need to stop any medicines before surgery, ask when to start taking them again.  We do this for your safety. Many medicines can make you bleed too much during surgery. Some change how well surgery (anesthesia) drugs work.  Call your insurance company to let them know you're having surgery. (If you don't have insurance, call [phone].)  Call your clinic if there's any change in your health. This includes signs of a cold or flu (sore throat, runny nose, cough, rash, fever). It also includes a scrape or scratch near the surgery site.  If you have questions on the day of surgery, call your hospital or surgery center.  Eating and drinking guidelines  For your safety: Unless your surgeon tells you otherwise,  follow the guidelines below.  Eat and drink as usual until 8 hours before you arrive for surgery. After that, no food or milk.  Drink clear liquids until 2 hours before you arrive. These are liquids you can see through, like water, Gatorade, and Propel Water. They also include plain black coffee and tea (no cream or milk), candy, and breath mints. You can spit out gum when you arrive.  If you drink alcohol: Stop drinking it the night before surgery.  If your care team tells you to take medicine on the morning of surgery, it's okay to take it with a sip of water.  Preventing infection  Shower or bathe the night before and morning of your surgery. Follow the instructions your clinic gave you. (If no instructions, use regular soap.)  Don't shave or clip hair near your surgery site. We'll remove the hair if needed.  Don't smoke or vape the morning of surgery. You may chew nicotine gum up to 2 hours before surgery. A nicotine patch is okay.  Note: Some surgeries require you to completely quit smoking and nicotine. Check with your surgeon.  Your care team will make every effort to keep you safe from infection. We will:  Clean our hands often with soap and water (or an alcohol-based hand rub).  Clean the skin at your surgery site with a special soap that kills germs.  Give you a special gown to keep you warm. (Cold raises the risk of infection.)  Wear special hair covers, masks, gowns and gloves during surgery.  Give antibiotic medicine, if prescribed. Not all surgeries need antibiotics.  What to bring on the day of surgery  Photo ID and insurance card  Copy of your health care directive, if you have one  Glasses and hearing aids (bring cases)  You can't wear contacts during surgery  Inhaler and eye drops, if you use them (tell us about these when you arrive)  CPAP machine or breathing device, if you use them  A few personal items, if spending the night  If you have . . .  A pacemaker, ICD (cardiac defibrillator) or other  implant: Bring the ID card.  An implanted stimulator: Bring the remote control.  A legal guardian: Bring a copy of the certified (court-stamped) guardianship papers.  Please remove any jewelry, including body piercings. Leave jewelry and other valuables at home.  If you're going home the day of surgery  You must have a responsible adult drive you home. They should stay with you overnight as well.  If you don't have someone to stay with you, and you aren't safe to go home alone, we may keep you overnight. Insurance often won't pay for this.  After surgery  If it's hard to control your pain or you need more pain medicine, please call your surgeon's office.  Questions?   If you have any questions for your care team, list them here: _________________________________________________________________________________________________________________________________________________________________________ ____________________________________ ____________________________________ ____________________________________  For informational purposes only. Not to replace the advice of your health care provider. Copyright   2003, 2019 [facility]. All rights reserved. Clinically reviewed by Hannah Casanova MD. [employer] 394028 - REV 12/22.        No blood test or EKG needed  Continue all medications as normal up through surgery

## 2023-12-15 NOTE — H&P (VIEW-ONLY)
[facility]  [address] [place] [address]  Phone: [phone]  Primary Provider: Darek Marks  Pre-op Performing Provider: KRYS GUNTER      PREOPERATIVE EVALUATION:  Today's date: 12/15/2023    Carlos is a 53 year old, presenting for the following:  Pre-Op Exam and Health Maintenance (No shots )        12/15/2023     7:24 AM   Additional Questions   Roomed by urmila mathur   Accompanied by self         12/15/2023     7:24 AM   Patient Reported Additional Medications   Patient reports taking the following new medications none       Surgical Information:  Surgery/Procedure: ARTHROSCOPY, KNEE meniscal and chondral debridement   Surgery Location: [place]  Surgeon: Carlos Barrientos MD   Surgery Date: 12/19/23  Time of Surgery: 4:40 PM   Where patient plans to recover: At home with family  Fax number for surgical facility: Note does not need to be faxed, will be available electronically in Epic.    Assessment & Plan     The proposed surgical procedure is considered INTERMEDIATE risk.    Problem List Items Addressed This Visit       HYPERLIPIDEMIA LDL GOAL <130    Idiopathic chronic gout of multiple sites without tophus     Other Visit Diagnoses       Preop general physical exam    -  Primary    Other tear of medial meniscus of left knee as current injury, subsequent encounter                        - No identified additional risk factors other than previously addressed    Antiplatelet or Anticoagulation Medication Instructions:   - Patient is on no antiplatelet or anticoagulation medications.    Additional Medication Instructions:  Patient is to take all scheduled medications on the day of surgery    RECOMMENDATION:  APPROVAL GIVEN to proceed with proposed procedure, without further diagnostic evaluation.            Subjective       HPI related to upcoming procedure: meniscal tear needing surgical management         12/14/2023     4:53 PM   Preop Questions   1. Have  you ever had a heart attack or stroke? No   2. Have you ever had surgery on your heart or blood vessels, such as a stent placement, a coronary artery bypass, or surgery on an artery in your head, neck, heart, or legs? No   3. Do you have chest pain with activity? No   4. Do you have a history of  heart failure? No   5. Do you currently have a cold, bronchitis or symptoms of other infection? No   6. Do you have a cough, shortness of breath, or wheezing? No   7. Do you or anyone in your family have previous history of blood clots? No   8. Do you or does anyone in your family have a serious bleeding problem such as prolonged bleeding following surgeries or cuts? No   9. Have you ever had problems with anemia or been told to take iron pills? No   10. Have you had any abnormal blood loss such as black, tarry or bloody stools? No   11. Have you ever had a blood transfusion? No   12. Are you willing to have a blood transfusion if it is medically needed before, during, or after your surgery? Yes   13. Have you or any of your relatives ever had problems with anesthesia? No   14. Do you have sleep apnea, excessive snoring or daytime drowsiness? No   15. Do you have any artifical heart valves or other implanted medical devices like a pacemaker, defibrillator, or continuous glucose monitor? No   16. Do you have artificial joints? No   17. Are you allergic to latex? No       Health Care Directive:  Patient does not have a Health Care Directive or Living Will: Patient states has Advance Directive and will bring in a copy to clinic.    Preoperative Review of :   reviewed - no record of controlled substances prescribed.      Status of Chronic Conditions:  See problem list for active medical problems.  Problems all longstanding and stable, except as noted/documented.  See ROS for pertinent symptoms related to these conditions.    HYPERLIPIDEMIA - Patient has a long history of significant Hyperlipidemia requiring medication for  treatment with recent good control. Patient reports no problems or side effects with the medication.     Review of Systems  CONSTITUTIONAL: NEGATIVE for fever, chills, change in weight  INTEGUMENTARY/SKIN: NEGATIVE for worrisome rashes, moles or lesions  EYES: NEGATIVE for vision changes or irritation  ENT/MOUTH: NEGATIVE for ear, mouth and throat problems  RESP: NEGATIVE for significant cough or SOB  CV: NEGATIVE for chest pain, palpitations or peripheral edema  GI: NEGATIVE for nausea, abdominal pain, heartburn, or change in bowel habits  : NEGATIVE for frequency, dysuria, or hematuria  MUSCULOSKELETAL: NEGATIVE for significant arthralgias or myalgia  NEURO: NEGATIVE for weakness, dizziness or paresthesias  ENDOCRINE: NEGATIVE for temperature intolerance, skin/hair changes  HEME: NEGATIVE for bleeding problems  PSYCHIATRIC: NEGATIVE for changes in mood or affect    Patient Active Problem List    Diagnosis Date Noted    Idiopathic chronic gout of multiple sites without tophus 12/15/2023     Priority: Medium    S/P arthroscopy of right knee 2018     Priority: Medium    Family history of ischemic heart disease 2015     Priority: Medium     Mom  age 55      HYPERLIPIDEMIA LDL GOAL <130 10/31/2010     Priority: Medium      Past Medical History:   Diagnosis Date    Complex tear of medial meniscus of right knee as current injury, initial encounter 2018    Toe pain 2009     Past Surgical History:   Procedure Laterality Date    ABDOMEN SURGERY      hernia    ARTHROSCOPY KNEE WITH DEBRIDEMENT JOINT, COMBINED Right 2018    Procedure: ARTHROSCOPY KNEE WITH DEBRIDEMENT JOINT;  Right knee arthroscopy with medial meniscus debridement;  Surgeon: Seth Basilio MD;  [facility]    COLONOSCOPY N/A 2019    Procedure: COLONOSCOPY;  Surgeon: Tyler Miles DO;  Location: [facility]    FRACTURE TX, ANKLE RT/LT      Fracture TX Ankle RT/LT    HERNIA REPAIR, INGUINAL RT/LT   "10/13/2006    Laparoscopic left inguinal hernia repair.    OPEN REDUCTION INTERNAL FIXATION FOREARM Right 11/05/2014    Procedure: OPEN REDUCTION INTERNAL FIXATION FOREARM;  Surgeon: Seth Basilio MD;  Location: [facility]    REPAIR HAMMER TOE  10/14/2011    Procedure:REPAIR HAMMER TOE; Correction Toes 4th,5th Toe Right Foot; Surgeon:ASIF CRUM; Location:[facility]    VASECTOMY Bilateral 05/06/2005     Current Outpatient Medications   Medication Sig Dispense Refill    allopurinol (ZYLOPRIM) 100 MG tablet TAKE ONE TABLET BY MOUTH ONCE DAILY 90 tablet 3    simvastatin (ZOCOR) 20 MG tablet TAKE ONE TABLET BY MOUTH AT BEDTIME 90 tablet 3       Allergies   Allergen Reactions    No Known Drug Allergy         Social History     Tobacco Use    Smoking status: Never    Smokeless tobacco: Former     Types: Chew     Quit date: 1/1/2015   Substance Use Topics    Alcohol use: Yes     Comment: daily 2 drinks     Family History   Problem Relation Age of Onset    C.A.D. Mother     Hyperlipidemia Mother     C.A.D. Maternal Grandfather     Hyperlipidemia Father     Diabetes No family hx of     Cancer - colorectal No family hx of      History   Drug Use No         Objective     /84 (BP Location: Right arm, Patient Position: Sitting, Cuff Size: Adult Large)   Pulse 98   Temp 97  F (36.1  C) (Tympanic)   Resp 16   Ht 1.88 m (6' 2.02\")   Wt 111.3 kg (245 lb 6.4 oz)   SpO2 97%   BMI 31.49 kg/m      Physical Exam    GENERAL APPEARANCE: healthy, alert and no distress     EYES: EOMI,  PERRL     HENT: ear canals and TM's normal and nose and mouth without ulcers or lesions     NECK: no adenopathy, no asymmetry, masses, or scars and thyroid normal to palpation     RESP: lungs clear to auscultation - no rales, rhonchi or wheezes     CV: regular rates and rhythm, normal S1 S2, no S3 or S4 and no murmur, click or rub     ABDOMEN:  soft, nontender, no HSM or masses and bowel sounds normal     MS: extremities normal- no gross " deformities noted, no evidence of inflammation in joints, FROM in all extremities.     SKIN: no suspicious lesions or rashes     NEURO: Normal strength and tone, sensory exam grossly normal, mentation intact and speech normal     PSYCH: mentation appears normal. and affect normal/bright     LYMPHATICS: No cervical adenopathy    Recent Labs   Lab Test 02/09/23  0854 04/14/22  1251   HGB  --  14.3   PLT  --  190    134   POTASSIUM 4.5 4.0   CR 0.92 0.88        Diagnostics:  No labs were ordered during this visit.   No EKG required, no history of coronary heart disease, significant arrhythmia, peripheral arterial disease or other structural heart disease.    Revised Cardiac Risk Index (RCRI):  The patient has the following serious cardiovascular risks for perioperative complications:   - No serious cardiac risks = 0 points     RCRI Interpretation: 0 points: Class I (very low risk - 0.4% complication rate)         Signed Electronically by: Armida Neal DO  Copy of this evaluation report is provided to requesting physician.

## 2023-12-18 ENCOUNTER — ANESTHESIA EVENT (OUTPATIENT)
Dept: SURGERY | Facility: CLINIC | Age: 53
End: 2023-12-18
Payer: COMMERCIAL

## 2023-12-18 NOTE — ANESTHESIA PREPROCEDURE EVALUATION
Anesthesia Pre-Procedure Evaluation    Patient: Carlos Keyes   MRN: [id number] : 1970        Procedure : Procedure(s):  ARTHROSCOPY, KNEE meniscal and chondral debridement          Past Medical History:   Diagnosis Date     Complex tear of medial meniscus of right knee as current injury, initial encounter 2018     Toe pain 2009      Past Surgical History:   Procedure Laterality Date     ABDOMEN SURGERY      hernia     ARTHROSCOPY KNEE WITH DEBRIDEMENT JOINT, COMBINED Right 2018    Procedure: ARTHROSCOPY KNEE WITH DEBRIDEMENT JOINT;  Right knee arthroscopy with medial meniscus debridement;  Surgeon: Seth Basilio MD;  [facility]     COLONOSCOPY N/A 2019    Procedure: COLONOSCOPY;  Surgeon: Tyler Miles DO;  [facility]     FRACTURE TX, ANKLE RT/LT      Fracture TX Ankle RT/LT     HERNIA REPAIR, INGUINAL RT/LT  10/13/2006    Laparoscopic left inguinal hernia repair.     OPEN REDUCTION INTERNAL FIXATION FOREARM Right 2014    Procedure: OPEN REDUCTION INTERNAL FIXATION FOREARM;  Surgeon: Seth Basilio MD;  [facility]     REPAIR HAMMER TOE  10/14/2011    Procedure:REPAIR HAMMER TOE; Correction Toes 4th,5th Toe Right Foot; Surgeon:ASIF CRUM; Location:[facility]     VASECTOMY Bilateral 2005      Allergies   Allergen Reactions     No Known Drug Allergy       Social History     Tobacco Use     Smoking status: Never     Smokeless tobacco: Former     Types: Chew     Quit date: 2015   Substance Use Topics     Alcohol use: Yes     Comment: daily 2 drinks      Wt Readings from Last 1 Encounters:   12/15/23 111.3 kg (245 lb 6.4 oz)        Anesthesia Evaluation   Pt has had prior anesthetic. Type: General and MAC.        ROS/MED HX  ENT/Pulmonary:  - neg pulmonary ROS     Neurologic:  - neg neurologic ROS     Cardiovascular:     (+) Dyslipidemia - -   -  - -                                 Previous cardiac testing   Echo: Date:  11/25/14 Results:  Baseline  The patient is in normal sinus rhythm.  Minor nonspecific ST depression in lead III.  Normal left ventricular wall motion.  The visual ejection fraction is estimated at 60-65%.     Stress  Exercise was stopped due to fatigue.  Mild, continuous 1/10 chest discomfort at rest that did not change with   stress.  The patient exercised 10:31 on the Mark Protocol.  No arrhythmia noted.  The EKG portion of this stress test was negative for inducible ischemia (see   echo results below).  There were no ST segment changes observed with stress.  Normal resting wall motion and no stress-induced wall motion abnormality.  This was a normal stress echocardiogram.     Mitral Valve  The mitral valve is normal in structure and function.  There is trace mitral regurgitation.     Tricuspid Valve  The tricuspid valve is normal in structure and function.  There is trace tricuspid regurgitation.     Aortic Valve  The aortic valve is normal in structure and function.  No aortic regurgitation is present.  No aortic stenosis is present.       Stress Test:  Date: Results:    ECG Reviewed:  Date: 4/14/22 Results:  Sinus  Rhythm   WITHIN NORMAL LIMITS  Cath:  Date: Results:      METS/Exercise Tolerance: >4 METS    Hematologic:  - neg hematologic  ROS     Musculoskeletal:  - neg musculoskeletal ROS     GI/Hepatic:  - neg GI/hepatic ROS     Renal/Genitourinary:  - neg Renal ROS     Endo:  - neg endo ROS     Psychiatric/Substance Use:  - neg psychiatric ROS     Infectious Disease:  - neg infectious disease ROS     Malignancy:  - neg malignancy ROS     Other:  - neg other ROS          Physical Exam    Airway  airway exam normal      Mallampati: II   TM distance: > 3 FB   Neck ROM: full   Mouth opening: > 3 cm    Respiratory Devices and Support         Dental       (+) Minor Abnormalities - some fillings, tiny chips      Cardiovascular   cardiovascular exam normal          Pulmonary   pulmonary exam normal        breath  "sounds clear to auscultation       OUTSIDE LABS:  CBC:   Lab Results   Component Value Date    WBC 4.3 04/14/2022    WBC 5.2 06/19/2019    HGB 14.3 04/14/2022    HGB 13.9 12/04/2014    HCT 42.2 04/14/2022    HCT 42.7 12/04/2014     04/14/2022     12/04/2014     BMP:   Lab Results   Component Value Date     02/09/2023     04/14/2022    POTASSIUM 4.5 02/09/2023    POTASSIUM 4.0 04/14/2022    CHLORIDE 99 02/09/2023    CHLORIDE 101 04/14/2022    CO2 29 02/09/2023    CO2 28 04/14/2022    BUN 15.8 02/09/2023    BUN 14 04/14/2022    CR 0.92 02/09/2023    CR 0.88 04/14/2022     (H) 02/09/2023     (H) 04/14/2022     COAGS: No results found for: \"PTT\", \"INR\", \"FIBR\"  POC: No results found for: \"BGM\", \"HCG\", \"HCGS\"  HEPATIC:   Lab Results   Component Value Date    ALBUMIN 4.6 02/09/2023    PROTTOTAL 7.1 02/09/2023    ALT 38 02/09/2023    AST 30 02/09/2023    ALKPHOS 66 02/09/2023    BILITOTAL 0.6 02/09/2023     OTHER:   Lab Results   Component Value Date    LACT 0.8 11/25/2014    MALIKA 10.0 02/09/2023    TSH 2.07 04/14/2022    CRP 13.6 (H) 12/04/2014    SED 7 06/19/2019       Anesthesia Plan    ASA Status:  2    NPO Status:  NPO Appropriate    Anesthesia Type: General.     - Airway: LMA   Induction: Intravenous.   Maintenance: Inhalation.        Consents    Anesthesia Plan(s) and associated risks, benefits, and realistic alternatives discussed. Questions answered and patient/representative(s) expressed understanding.     - Discussed:     - Discussed with:  Patient            Postoperative Care    Pain management: Multi-modal analgesia, IV analgesics, Oral pain medications.   PONV prophylaxis: Ondansetron (or other 5HT-3), Dexamethasone or Solumedrol     Comments:             Jerson Valenzuela, APRN CRNA    I have reviewed the pertinent notes and labs in the chart from the past 30 days and (re)examined the patient.  Any updates or changes from those notes are reflected in this note.            " "  # Obesity: Estimated body mass index is 31.49 kg/m  as calculated from the following:    Height as of 12/15/23: 1.88 m (6' 2.02\").    Weight as of 12/15/23: 111.3 kg (245 lb 6.4 oz).      "

## 2023-12-19 ENCOUNTER — ANESTHESIA (OUTPATIENT)
Dept: SURGERY | Facility: CLINIC | Age: 53
End: 2023-12-19
Payer: COMMERCIAL

## 2023-12-19 ENCOUNTER — HOSPITAL ENCOUNTER (OUTPATIENT)
Facility: CLINIC | Age: 53
Discharge: HOME | End: 2023-12-19
Attending: ORTHOPAEDIC SURGERY | Admitting: ORTHOPAEDIC SURGERY
Payer: COMMERCIAL

## 2023-12-19 VITALS
TEMPERATURE: 98.3 F | SYSTOLIC BLOOD PRESSURE: 140 MMHG | HEIGHT: 74 IN | DIASTOLIC BLOOD PRESSURE: 89 MMHG | HEART RATE: 71 BPM | RESPIRATION RATE: 14 BRPM | OXYGEN SATURATION: 98 % | BODY MASS INDEX: 31.49 KG/M2 | WEIGHT: 245.4 LBS

## 2023-12-19 DIAGNOSIS — S83.232A COMPLEX TEAR OF MEDIAL MENISCUS OF LEFT KNEE AS CURRENT INJURY, INITIAL ENCOUNTER: Primary | ICD-10-CM

## 2023-12-19 PROCEDURE — 370N000017 HC ANESTHESIA TECHNICAL FEE, PER MIN: Performed by: ORTHOPAEDIC SURGERY

## 2023-12-19 PROCEDURE — 250N000011 HC RX IP 250 OP 636: Performed by: NURSE ANESTHETIST, CERTIFIED REGISTERED

## 2023-12-19 PROCEDURE — 360N000076 HC SURGERY LEVEL 3, PER MIN: Performed by: ORTHOPAEDIC SURGERY

## 2023-12-19 PROCEDURE — 250N000009 HC RX 250: Performed by: NURSE ANESTHETIST, CERTIFIED REGISTERED

## 2023-12-19 PROCEDURE — 710N000010 HC RECOVERY PHASE 1, LEVEL 2, PER MIN: Performed by: ORTHOPAEDIC SURGERY

## 2023-12-19 PROCEDURE — 250N000011 HC RX IP 250 OP 636: Performed by: PHYSICIAN ASSISTANT

## 2023-12-19 PROCEDURE — 999N000141 HC STATISTIC PRE-PROCEDURE NURSING ASSESSMENT: Performed by: ORTHOPAEDIC SURGERY

## 2023-12-19 PROCEDURE — 250N000025 HC SEVOFLURANE, PER MIN: Performed by: ORTHOPAEDIC SURGERY

## 2023-12-19 PROCEDURE — 258N000003 HC RX IP 258 OP 636: Performed by: NURSE ANESTHETIST, CERTIFIED REGISTERED

## 2023-12-19 PROCEDURE — 29880 ARTHRS KNE SRG MNISECTMY M&L: CPT | Mod: LT | Performed by: ORTHOPAEDIC SURGERY

## 2023-12-19 PROCEDURE — 272N000001 HC OR GENERAL SUPPLY STERILE: Performed by: ORTHOPAEDIC SURGERY

## 2023-12-19 PROCEDURE — 250N000013 HC RX MED GY IP 250 OP 250 PS 637: Performed by: NURSE ANESTHETIST, CERTIFIED REGISTERED

## 2023-12-19 PROCEDURE — 710N000012 HC RECOVERY PHASE 2, PER MINUTE: Performed by: ORTHOPAEDIC SURGERY

## 2023-12-19 RX ORDER — ONDANSETRON 4 MG/1
4 TABLET, ORALLY DISINTEGRATING ORAL EVERY 30 MIN PRN
Status: DISCONTINUED | OUTPATIENT
Start: 2023-12-19 | End: 2023-12-19 | Stop reason: HOSPADM

## 2023-12-19 RX ORDER — SODIUM CHLORIDE, SODIUM LACTATE, POTASSIUM CHLORIDE, CALCIUM CHLORIDE 600; 310; 30; 20 MG/100ML; MG/100ML; MG/100ML; MG/100ML
INJECTION, SOLUTION INTRAVENOUS CONTINUOUS
Status: DISCONTINUED | OUTPATIENT
Start: 2023-12-19 | End: 2023-12-19 | Stop reason: HOSPADM

## 2023-12-19 RX ORDER — AMOXICILLIN 250 MG
1-2 CAPSULE ORAL 2 TIMES DAILY
Qty: 30 TABLET | Refills: 0 | Status: SHIPPED | OUTPATIENT
Start: 2023-12-19 | End: 2024-01-04

## 2023-12-19 RX ORDER — HYDROMORPHONE HCL IN WATER/PF 6 MG/30 ML
0.4 PATIENT CONTROLLED ANALGESIA SYRINGE INTRAVENOUS EVERY 5 MIN PRN
Status: DISCONTINUED | OUTPATIENT
Start: 2023-12-19 | End: 2023-12-19 | Stop reason: HOSPADM

## 2023-12-19 RX ORDER — ONDANSETRON 2 MG/ML
INJECTION INTRAMUSCULAR; INTRAVENOUS PRN
Status: DISCONTINUED | OUTPATIENT
Start: 2023-12-19 | End: 2023-12-19

## 2023-12-19 RX ORDER — HYDROXYZINE HYDROCHLORIDE 25 MG/1
25 TABLET, FILM COATED ORAL EVERY 6 HOURS PRN
Status: DISCONTINUED | OUTPATIENT
Start: 2023-12-19 | End: 2023-12-19 | Stop reason: HOSPADM

## 2023-12-19 RX ORDER — GABAPENTIN 300 MG/1
300 CAPSULE ORAL
Status: COMPLETED | OUTPATIENT
Start: 2023-12-19 | End: 2023-12-19

## 2023-12-19 RX ORDER — HYDROXYZINE HYDROCHLORIDE 25 MG/1
25 TABLET, FILM COATED ORAL
Status: DISCONTINUED | OUTPATIENT
Start: 2023-12-19 | End: 2023-12-25 | Stop reason: HOSPADM

## 2023-12-19 RX ORDER — PROPOFOL 10 MG/ML
INJECTION, EMULSION INTRAVENOUS PRN
Status: DISCONTINUED | OUTPATIENT
Start: 2023-12-19 | End: 2023-12-19

## 2023-12-19 RX ORDER — KETAMINE HYDROCHLORIDE 10 MG/ML
INJECTION INTRAMUSCULAR; INTRAVENOUS PRN
Status: DISCONTINUED | OUTPATIENT
Start: 2023-12-19 | End: 2023-12-19

## 2023-12-19 RX ORDER — LIDOCAINE 40 MG/G
CREAM TOPICAL
Status: DISCONTINUED | OUTPATIENT
Start: 2023-12-19 | End: 2023-12-19 | Stop reason: HOSPADM

## 2023-12-19 RX ORDER — FENTANYL CITRATE 50 UG/ML
50 INJECTION, SOLUTION INTRAMUSCULAR; INTRAVENOUS EVERY 5 MIN PRN
Status: DISCONTINUED | OUTPATIENT
Start: 2023-12-19 | End: 2023-12-19 | Stop reason: HOSPADM

## 2023-12-19 RX ORDER — ALBUTEROL SULFATE 0.83 MG/ML
2.5 SOLUTION RESPIRATORY (INHALATION) EVERY 4 HOURS PRN
Status: DISCONTINUED | OUTPATIENT
Start: 2023-12-19 | End: 2023-12-19 | Stop reason: HOSPADM

## 2023-12-19 RX ORDER — ACETAMINOPHEN 325 MG/1
975 TABLET ORAL ONCE
Status: COMPLETED | OUTPATIENT
Start: 2023-12-19 | End: 2023-12-19

## 2023-12-19 RX ORDER — CEFAZOLIN SODIUM/WATER 2 G/20 ML
2 SYRINGE (ML) INTRAVENOUS SEE ADMIN INSTRUCTIONS
Status: DISCONTINUED | OUTPATIENT
Start: 2023-12-19 | End: 2023-12-19 | Stop reason: HOSPADM

## 2023-12-19 RX ORDER — CEFAZOLIN SODIUM/WATER 2 G/20 ML
2 SYRINGE (ML) INTRAVENOUS
Status: COMPLETED | OUTPATIENT
Start: 2023-12-19 | End: 2023-12-19

## 2023-12-19 RX ORDER — LIDOCAINE HYDROCHLORIDE 20 MG/ML
INJECTION, SOLUTION INFILTRATION; PERINEURAL PRN
Status: DISCONTINUED | OUTPATIENT
Start: 2023-12-19 | End: 2023-12-19

## 2023-12-19 RX ORDER — OXYCODONE HYDROCHLORIDE 5 MG/1
5 TABLET ORAL
Status: DISCONTINUED | OUTPATIENT
Start: 2023-12-19 | End: 2023-12-25 | Stop reason: HOSPADM

## 2023-12-19 RX ORDER — FENTANYL CITRATE 50 UG/ML
INJECTION, SOLUTION INTRAMUSCULAR; INTRAVENOUS PRN
Status: DISCONTINUED | OUTPATIENT
Start: 2023-12-19 | End: 2023-12-19

## 2023-12-19 RX ORDER — DEXAMETHASONE SODIUM PHOSPHATE 4 MG/ML
INJECTION, SOLUTION INTRA-ARTICULAR; INTRALESIONAL; INTRAMUSCULAR; INTRAVENOUS; SOFT TISSUE PRN
Status: DISCONTINUED | OUTPATIENT
Start: 2023-12-19 | End: 2023-12-19

## 2023-12-19 RX ORDER — GLYCOPYRROLATE 0.2 MG/ML
INJECTION, SOLUTION INTRAMUSCULAR; INTRAVENOUS PRN
Status: DISCONTINUED | OUTPATIENT
Start: 2023-12-19 | End: 2023-12-19

## 2023-12-19 RX ORDER — ONDANSETRON 4 MG/1
4 TABLET, ORALLY DISINTEGRATING ORAL
Status: DISCONTINUED | OUTPATIENT
Start: 2023-12-19 | End: 2023-12-25 | Stop reason: HOSPADM

## 2023-12-19 RX ORDER — ONDANSETRON 2 MG/ML
4 INJECTION INTRAMUSCULAR; INTRAVENOUS EVERY 30 MIN PRN
Status: DISCONTINUED | OUTPATIENT
Start: 2023-12-19 | End: 2023-12-19 | Stop reason: HOSPADM

## 2023-12-19 RX ORDER — ASPIRIN 325 MG
325 TABLET ORAL DAILY
Qty: 14 TABLET | Refills: 0 | Status: SHIPPED | OUTPATIENT
Start: 2023-12-19 | End: 2024-01-02

## 2023-12-19 RX ORDER — HYDROCODONE BITARTRATE AND ACETAMINOPHEN 5; 325 MG/1; MG/1
1-2 TABLET ORAL EVERY 6 HOURS PRN
Qty: 12 TABLET | Refills: 0 | Status: SHIPPED | OUTPATIENT
Start: 2023-12-19 | End: 2024-01-04

## 2023-12-19 RX ADMIN — FENTANYL CITRATE 100 MCG: 50 INJECTION INTRAMUSCULAR; INTRAVENOUS at 17:10

## 2023-12-19 RX ADMIN — FENTANYL CITRATE 100 MCG: 50 INJECTION INTRAMUSCULAR; INTRAVENOUS at 16:44

## 2023-12-19 RX ADMIN — Medication 2 G: at 16:35

## 2023-12-19 RX ADMIN — GABAPENTIN 300 MG: 300 CAPSULE ORAL at 16:06

## 2023-12-19 RX ADMIN — MIDAZOLAM 2 MG: 1 INJECTION INTRAMUSCULAR; INTRAVENOUS at 16:44

## 2023-12-19 RX ADMIN — ONDANSETRON 4 MG: 2 INJECTION INTRAMUSCULAR; INTRAVENOUS at 16:48

## 2023-12-19 RX ADMIN — LIDOCAINE HYDROCHLORIDE 100 MG: 20 INJECTION, SOLUTION INFILTRATION; PERINEURAL at 16:44

## 2023-12-19 RX ADMIN — PROPOFOL 40 MG: 10 INJECTION, EMULSION INTRAVENOUS at 17:21

## 2023-12-19 RX ADMIN — KETAMINE HYDROCHLORIDE 25 MG: 10 INJECTION INTRAMUSCULAR; INTRAVENOUS at 16:50

## 2023-12-19 RX ADMIN — ACETAMINOPHEN 975 MG: 325 TABLET, FILM COATED ORAL at 16:06

## 2023-12-19 RX ADMIN — DEXAMETHASONE SODIUM PHOSPHATE 4 MG: 4 INJECTION, SOLUTION INTRA-ARTICULAR; INTRALESIONAL; INTRAMUSCULAR; INTRAVENOUS; SOFT TISSUE at 16:48

## 2023-12-19 RX ADMIN — KETAMINE HYDROCHLORIDE 25 MG: 10 INJECTION INTRAMUSCULAR; INTRAVENOUS at 17:00

## 2023-12-19 RX ADMIN — PROPOFOL 200 MG: 10 INJECTION, EMULSION INTRAVENOUS at 16:48

## 2023-12-19 RX ADMIN — SODIUM CHLORIDE, POTASSIUM CHLORIDE, SODIUM LACTATE AND CALCIUM CHLORIDE: 600; 310; 30; 20 INJECTION, SOLUTION INTRAVENOUS at 16:46

## 2023-12-19 RX ADMIN — GLYCOPYRROLATE 0.2 MG: 0.2 INJECTION, SOLUTION INTRAMUSCULAR; INTRAVENOUS at 17:00

## 2023-12-19 ASSESSMENT — ACTIVITIES OF DAILY LIVING (ADL)
ADLS_ACUITY_SCORE: 35

## 2023-12-19 NOTE — OP NOTE
DATE OF SERVICE:  12/19/2023    PREOPERATIVE DIAGNOSIS:  left knee, medial meniscus tear, medial and patello-femoral chondrosis    POSTOPERATIVE DIAGNOSIS: left knee, medial meniscus tear, lateral meniscus tear, medial chondrosis    OPERATION PERFORMED:  1. left knee, arthroscopic medial meniscus debridement   2. Left knee arthroscopy, lateral meniscus debridement  3. left knee, arthroscopic medial plica resection  4. left knee, arthroscopy shaving chondroplasty medial femoral condyle    ATTENDING SURGEON: Carlos Barrientos M.D., M.S.    ASSISTANT(S): Peter Mccarty Pa-C    ANESTHESIA:   General, in the supine position.    IV FLUIDS:  700 mL LR.    EBL:  2mL .    URINE OUTPUT: no garcia    TOURNIQUET TIME: 15 minutes at 250mmHg.    IMPLANTS / GRAFTS:     none    COMPLICATIONS:  NONE    ANTIBIOTICS:  Cefazolin 2 gm intravenously prior to tourniquet inflation      SPECIMENS: none    FINDINGS:   no effusion  mild suprapatellar synovitis  no loose bodies in the medial and lateral gutters  intact anterior cruciate ligament within the notch  Grade I patellar chondrosis  Grade I femoral trochlea chondrosis  Grade I lateral chondrosis  Small flap tear posterior root lateral meniscus , otherwise intact  Grade II-III medial femoral condyle chondrosis  complex medial meniscus  with horizontal cleavage component and small flaps.    DRAINS: none.    INDICATIONS FOR PROCEDURE: Carlos Keyes is a 53 year old male seen for evaluation of ongoing left knee pain with no known injury.   Pain has been present for 4 months, 8/2023. Pain is getting worse, located along the inner aspect. Pain is aggravated walking on uneven ground, twisting, knees touching at night, driving, sitting too long in a continuous position. Otherwise ok on flat grounds. No recent treatment other than tylenol.     MRI showed complex medial meniscus tear, medial compartment pand patello-femoral chondrosis.    * Discussed treatment options including nonoperative treatment  with continued rest, ice, elevation, activity modification, NSAIDS and Physical Therapy, bracing and potential injections versus surgical treatment with arthroscopy and meniscal repair versus debridement, possible chondral debridement. Risks and benefits of each discussed in detail.  * in the setting of underlying chondrosis, predictability of arthroscopy is uncertain, unless mechanical symptoms present due to the meniscus tear.    * surgical risks discussed: bleeding, infection, pain, scar, damage to adjacent structures (nerve, vessels, cartilage), stiffness, post-traumatic arthritis, failure to relieve symptoms, recurrence of symptoms, blood clots (DVT), pulmonary emolism, risks of anesthesia and death. This surgery is not intended nor expected to alleviate arthritic pain symptoms, nor will it treat or correct underlying arthritic changes. Arthritis and symptoms related to arthritis could worsen with arthroscopy and meniscal and/or chondral debridement. Patient understands.    * understanding the risks of surgery, as a quality of life decision, they elected to proceed with arthroscopy        PROCEDURE AND FINDINGS:    The patient was identified in the preoperative holding area. The correct surgical procedure and procedureal site was confirmed with the patient. Again, the risks of surgery were reviewed. The patient elected to proceed understanding the risks. Written informed consent was obtained. The correct surgical site was marked with an indelible marker by myself, Carlos Barrientos MD, the surgeon.     The patient was then taken to the operating room and placed supine on the operating table. After adequate anesthesia was obtained, a non-sterile tourniquet was placed to the left upper thigh. All bony prominences were well padded. The arms were well positioned and padded to be comfortable. The left lower extremity  was prepped and draped in the usual sterile fashion.    A time-out was completed confirming the  correct patient, the correct surgery and surgical site, positioning, the availability of instruments and implants, the administration of prophylactic antibiotics, and review of known allergies by all surgical staff.    At that time, the left lower extremity was elevated and ensanguinated with an emarch, tourniquet inflated. A standard lateral arthroscopy portal was made and the camera introduced. Upon entering the suprapatellar pouch, there was no effusion, mild suprapatellar synovitis. no loose bodies. Medial and lateral gutters were examined. There was noted to be mild chondrosis of the patella and mild chondrosis of the femoral trochlea. Upon entering the notch, the anterior cruciate ligament appeared grossly intact. At that time, a medial portal was made guided with a spinal needle. Probe was introduced and probing the anterior cruciate ligament confirmed it was intact.     The knee was then placed into figure-4 position, noting a small superior flap tear of the posterior root lateral meniscus, probing superior and inferior surfaces. There was mild chondrosis of the lateral tibia and mild chondrosis of the lateral femoral condyle.    At that time, using the oscillating debrider, the small posterior root flap was debrided until stable with probing. The root was otherwise intact and stable and the remainder of the lateral meniscus was in good condition.    The knee was then placed into extension and back into the suprapatellar pouch, resecting off a medial plica.     Then to the medial compartment, using gentle valgus stress. Upon examination, noting complex tearing of the posterior horn medial meniscus, probing superior and inferior surfaces. There was grade II-III chondrosis of the medial tibia and minimal chondrosis of the medial femoral condyle.      Using the oscillating debrider, a shaving chondroplasty of the medial femoral condyle..    Then, alternating between a meniscal biter and the debrider, the medial  meniscus tear was debrided back until stable, confirmed with probing. There was myxoid degeneration of the medial meniscus.    A final diagnostic examination of the knee was performed, removing any loose cartilage or soft tissue components. The remaining fluid was drained from the knee and instruments were removed. Wounds were closed with 3-0 prolene, steri strips. The wounds were injected with 0.25% marcaine plain. This was followed by a well padded soft dressing and compression wrap. The tourniquet was deflated.    The patient was then taken to recovery in stable condition.    The postoperative plan will be weight bearing as tolerated, home exercise program. Oral pain medications will include Norco versus over the counter. Stool softeners while on pain medications. Deep vein thrombosis prophylaxis with daily 325mg ASA x4 weeks. Patient will return to clinic 2 weeks time, sooner if needed, for wound check, suture removal. Physical Therapy will be determined at that time.  We look forward to following the patient through the perioperative time period.    Carlos Barrientos M.D., M.S.  Dept. of Orthopaedic Surgery  [facility]

## 2023-12-19 NOTE — ANESTHESIA CARE TRANSFER NOTE
Patient: Carlos Keyes    Procedure: Procedure(s):  ARTHROSCOPY, KNEE meniscal and chondral debridement       Diagnosis: Tear of medial meniscus of left knee, current, unspecified tear type, initial encounter [S83.245A]  Diagnosis Additional Information: No value filed.    Anesthesia Type:   General     Note:    Oropharynx: spontaneously breathing and oral airway in place  Level of Consciousness: drowsy  Oxygen Supplementation: face mask  Level of Supplemental Oxygen (L/min / FiO2): 6  Independent Airway: airway patency satisfactory and stable  Dentition: dentition unchanged  Vital Signs Stable: post-procedure vital signs reviewed and stable  Report to RN Given: handoff report given  Patient transferred to: PACU    Handoff Report: Identifed the Patient, Identified the Reponsible Provider, Reviewed the pertinent medical history, Discussed the surgical course, Reviewed Intra-OP anesthesia mangement and issues during anesthesia, Set expectations for post-procedure period and Allowed opportunity for questions and acknowledgement of understanding    Vitals:  Vitals Value Taken Time   BP     Temp     Pulse     Resp     SpO2         Electronically Signed By: CIRILO Villarreal CRNA  December 19, 2023  5:36 PM

## 2023-12-19 NOTE — H&P
The History and Physical on patient's chart was personally reviewed today with the patient. there have been no interval changes in patient's history since H+P performed.    History:  Carlos Keyes is a 53 year old male seen for evaluation of ongoing left knee pain with no known injury.   Pain has been present for 4 months, 8/2023. Pain is getting worse, located along the inner aspect. Pain is aggravated walking on uneven ground, twisting, knees touching at night, driving, sitting too long in a continuous position. Otherwise ok on flat grounds. No recent treatment other than tylenol.     X-RAY:  3 views left knee from 9/26/2023  -- no obvious fractures or dislocations. Normal joint spacing and alignment. No acute fracture. No sizable joint effusion. There is some ossification within the region of the distal quadriceps tendon.     MRI:  MRI left knee from 12/4/2023   1.  Horizontal-oblique tear of the body and posterior horn of the left knee medial meniscus.  2.  Focal area of grade 4 cartilage fissuring with subchondral edema  along the medial patellar facet with grade 2/3 cartilage loss along  the medial trochlear surface. Mild diffuse cartilage thinning in the  medial femorotibial joint compartment without full-thickness cartilage  loss or subchondral edema  3.  Small left knee knee effusion.  4.  The anterior and posterior cruciate ligaments, medial and lateral  supporting structures, and lateral meniscus are intact.          ASSESSMENT/PLAN: Carlos Keyes is a 53 year old male with chronic left knee pain, complex medial meniscus tear.      * discussed exam and imaging findings with them, what appears to be a complex medial meniscal tear on MRI, as well as some underlying arthritic changes, which is consistent with symptoms and physical examination findings.      * Discussed treatment options including nonoperative treatment with continued rest, ice, elevation, activity modification, NSAIDS and Physical Therapy,  bracing and potential injections versus surgical treatment with arthroscopy and meniscal repair versus debridement, possible chondral debridement. Risks and benefits of each discussed in detail.  * in the setting of underlying chondrosis, predictability of arthroscopy is uncertain, unless mechanical symptoms present due to the meniscus tear.     * surgical risks discussed: bleeding, infection, pain, scar, damage to adjacent structures (nerve, vessels, cartilage), stiffness, post-traumatic arthritis, failure to relieve symptoms, recurrence of symptoms, blood clots (DVT), pulmonary emolism, risks of anesthesia and death. This surgery is not intended nor expected to alleviate arthritic pain symptoms, nor will it treat or correct underlying arthritic changes. Arthritis and symptoms related to arthritis could worsen with arthroscopy and meniscal and/or chondral debridement. Patient understands.     * understanding the risks of surgery, as a quality of life decision, they elected to proceed with arthroscopy  * plan: LEFT knee arthroscopy with partial meniscal debridement, possible chondral debridement, outpatient surgery    Patient elects to proceed with planned procedure. Left knee arthroscopy, meniscal and chondral debridement. Outpatient.    Risks and perceived benefits of surgery again discussed with patient. Patient's questions addressed and answered. Written informed consent obtained and reviewed. Surgical site marked with indelible marker with patient's participation after confirming site with patient.      Carlos Barrientos M.D., M.S.  Dept. of Orthopaedic Surgery  [facility]

## 2023-12-19 NOTE — DISCHARGE INSTRUCTIONS
Name: Carlos Keyes MRN #: [id number]  Date: 12/19/2023  Procedure: left knee arthroscopy, meniscal and chondral debridement.  Discharge to home when stable, tolerating clear liquids, and patient has urinated  Call for follow-up appointment, [phone], with Dr. Barrientos in:  2 weeks.   WOUND CARE  The bandage may be slightly bloody. This is normal.  Ice:  Keep an ice bag on your knee for 20 minutes at a time.  Keep incisions clean and dry following surgery for:  72 hours   Change all bandages in:  72 hours       If bandages are changed before follow-up, cover all incisions with fresh bandages or bandaids.  O.K. to shower (may get incision wet) in:  72 hours  No tub baths, swimming pools, hot tubs, etc. for a minimum of 2 weeks following surgery  ACTIVITY  Keep leg elevated on a pillow placed under ankle. Do not keep pillow under your knee.  Weight-bearing ([place]):  Weight-bear as tolerated     May discontinue crutches in 2-3 days if able to walk without a limp.  Bracing: no brace needed.  Range of motion limits: no limit. Work to regain full knee range of motion.  Exercises:  Perform exercises 3 times a day for a minimum of 25 reps each time (start today or tomorrow):             Quadriceps sets  Calf Pumps Straight leg raises  Heels Slides   Start Physical Therapy: we will discuss if needed at your postoperative visit.  OK to drive:  Not for 24 hours  When going back to driving, be sure to test braking/acceleration maneuvers in an empty parking lot before entry into any traffic areas.    ABSOLUTELY NO DRIVING WHILE TAKING NARCOTICS!    DISCHARGE MEDICATIONS:   Aspirin 325 mg, 1 tablet, take once a day for 14 days then stop (to prevent blood clots) (over the counter)  Norco (5/325), 1 to 2 tablets, take every   6 hours as needed for pain  Other: stool softeners while on pain medications. Ok to take over the counter medications such as acetaminophen or ibuprofen as needed instead of prescription pain  medications.  Strong pain medication has been prescribed. Use as directed. Do not combine with alcohol. Be careful as you walk or climb stairs.   DIET:  If no nausea, clear liquids should be taken initially.  Then progress to solid foods when clear liquids are tolerated.   RESPONSE TO SURGERY: It is normal to have pain and swelling in your knee after surgery. It may take 4 weeks or longer for the swelling to go away. It is also common to notice some bruising around the knee, thigh, and calf as the swelling resolves.  EMERGENCY: Call or return for any fevers (temperature greater than 101.5   or sustained fevers greater than 100.5   that haven t resolved within 3 to 4 days following surgery) or chills, increasing pain, swelling, redness, calf pain, drainage (especially if yellow, green, or foul smelling), excessive bleeding), chest pain, shortness of breath:  Phone #: [phone]; If emergency, go to local ER or dial 911.    Carlos Barrientos M.D., M.S.  Dept. of Orthopaedic Surgery  [facility]    12/19/2023        KNEE SURGERY - HOME EXERCISE PROGRAM    All exercises to be performed at least 3 times per day.     Quad Sets  Sit with leg extended  Tighten quad muscles in front of leg, trying to  push back of knee downward  Hold exercise for 10 seconds  Rest 10 seconds between reps  Perform 1 set of 20 reps, 3 times a day     Heel Slides   Lie on back with legs straight  Slide heel to buttocks   Return to start position  Repeat with other leg  Perform 1 rep every 4 seconds  Perform 3 sets of 20 reps, 3 times a day  Rest 1 minute between sets     Ankle Pumps   Lie on back with foot elevated on pillow  Move foot up and down, pumping ankle  Perform 3 sets of 20 reps, 3 times a day  Perform 1 rep every 4 seconds  Rest 1 minute between sets     Straight Leg Raise  Lie on back with uninvolved knee bent  Raise straight leg to thigh level of bend leg  Return to starting position  Perform 3 sets of 20 reps, 3  times a day  Perform 1 rep every 4 seconds  Rest 1 minute between sets                              Same Day Surgery Discharge Instructions  Special Precautions After Surgery - Adult    It is not unusual to feel lightheaded or faint, up to 24 hours after surgery or while taking pain medication.  If you have these symptoms; sit for a few minutes before standing and have someone assist you when getting up.  You should rest and relax for the next 24 hours and must have someone stay with you for at least 24 hours after your discharge.  DO NOT DRIVE any vehicle or operate mechanical equipment for 24 hours following the end of your surgery.  DO NOT DRIVE while taking narcotic pain medications that have been prescribed by your physician.  If you had a limb operated on, you must be able to use it fully to drive.  DO NOT drink alcoholic beverages for 24 hours following surgery or while taking prescription pain medication.  Drink clear liquids (apple juice, ginger ale, broth, 7-Up, etc.).  Progress to your regular diet as you feel able.  Any questions call your physician and do not make important decisions for 24 hours.    Medications:  Hydrocodone (Norco, Vicodin):  Next dose: Can start at anytime for pain.  Senna-docusate (stool softener):  Next dose: Can start this evening, take while using narcotics.  Follow the instructions on the bottle.  __________________________________________________________________________________________________________________________________  IMPORTANT NUMBERS:    [facility] Main Number:  [phone], [phone]  Pharmacy:  [phone]  Same Day Surgery:  [phone], Monday - Friday until 8:30 p.m.  Urgent Care:  [phone]  Emergency Room:  [phone]      [facility]:  [phone]                                                                             [facility]:  [phone] option 1  Nurse Advice Line: [phone]

## 2023-12-19 NOTE — INTERVAL H&P NOTE
"The History and Physical has been reviewed, the patient has been examined and no changes have occurred in the patient's condition since the H & P was completed.        Clinical Conditions Present on Arrival:  Clinically Significant Risk Factors Present on Admission                  # Obesity: Estimated body mass index is 31.49 kg/m  as calculated from the following:    Height as of 12/15/23: 1.88 m (6' 2.02\").    Weight as of 12/15/23: 111.3 kg (245 lb 6.4 oz).       "

## 2023-12-19 NOTE — ANESTHESIA PROCEDURE NOTES
Airway       Patient location during procedure: OR  Staff -        CRNA: Niesha Doty APRN CRNA       Performed By: CRNAIndications and Patient Condition       Indications for airway management: chelsey-procedural, airway protection and altered level of consciousness       Induction type:intravenous       Mask difficulty assessment: 1 - vent by mask    Final Airway Details       Final airway type: supraglottic airway    Supraglottic Airway Details        Type: LMA       Brand: Air-Q       LMA size: 4.5    Post intubation assessment        Placement verified by: capnometry, equal breath sounds and chest rise        Number of attempts at approach: 1       Number of other approaches attempted: 0       Secured with: tape       Ease of procedure: easy       Dentition: Unchanged

## 2023-12-20 ASSESSMENT — ACTIVITIES OF DAILY LIVING (ADL)
ADLS_ACUITY_SCORE: 35

## 2023-12-20 NOTE — ANESTHESIA POSTPROCEDURE EVALUATION
Patient: Carlos Keyes    Procedure: Procedure(s):  ARTHROSCOPY, KNEE meniscal and chondral debridement       Anesthesia Type:  General    Note:  Disposition: Outpatient   Postop Pain Control: Uneventful            Sign Out: Well controlled pain   PONV: No   Neuro/Psych: Uneventful            Sign Out: Acceptable/Baseline neuro status   Airway/Respiratory: Uneventful            Sign Out: Acceptable/Baseline resp. status   CV/Hemodynamics: Uneventful            Sign Out: Acceptable CV status; No obvious hypovolemia; No obvious fluid overload   Other NRE: NONE   DID A NON-ROUTINE EVENT OCCUR? No           Last vitals:  Vitals Value Taken Time   /94 12/19/23 1805   Temp     Pulse 67 12/19/23 1805   Resp 8 12/19/23 1805   SpO2 98 % 12/19/23 1805   Vitals shown include unfiled device data.    Electronically Signed By: CIRILO Lucas CRNA  December 19, 2023  7:31 PM

## 2023-12-21 ASSESSMENT — ACTIVITIES OF DAILY LIVING (ADL)
ADLS_ACUITY_SCORE: 35

## 2023-12-22 ASSESSMENT — ACTIVITIES OF DAILY LIVING (ADL)
ADLS_ACUITY_SCORE: 35

## 2023-12-22 NOTE — PROGRESS NOTES
Chief Complaint   Patient presents with    Left Knee - Surgical Followup     Arthroscopy - M&L meniscus debridement. DOS 12/19/23, 2 wk s/p. Patient notes his knee is doing good, getting better. He has a little stiffness. No issues or concerns.       SURGERY: (YADIEL Alcaraz)  1. left knee, arthroscopic medial meniscus debridement   2. Left knee arthroscopy, lateral meniscus debridement  3. left knee, arthroscopic medial plica resection  4. left knee, arthroscopy shaving chondroplasty medial femoral condyle  DATE OF SURGERY: 12/19/2023.      HISTORY OF PRESENT ILLNESS:  Carlos Keyes is a 53 year old male seen for postoperative evaluation of a left knee arthroscopy and medial and lateral meniscus debridement. Surgery occurred 2 weeks ago. Returns today stating doing quite well. Pain has been improving, minimal. No problems with the surgical wounds. Denies fevers chills or night sweats. No associated numbness or tingling. Has been doing home exercise program  since surgery as recommended. Taking aspirin daily.    Some stiffness, swelling, especially when up on it more. No concerns. He is pleased. He has noticed his preop symptoms resolved.    OR FINDINGS:   no effusion  mild suprapatellar synovitis  no loose bodies in the medial and lateral gutters  intact anterior cruciate ligament within the notch  Grade I patellar chondrosis  Grade I femoral trochlea chondrosis  Grade I lateral chondrosis  Small flap tear posterior root lateral meniscus , otherwise intact  Grade II-III medial femoral condyle chondrosis  complex medial meniscus  with horizontal cleavage component and small flaps.    Past Medical History:   Diagnosis Date    Complex tear of medial meniscus of right knee as current injury, initial encounter 08/07/2018    Toe pain 01/29/2009       Past Surgical History:   Procedure Laterality Date    ABDOMEN SURGERY  2001    hernia    ARTHROSCOPY KNEE Left 12/19/2023    Procedure: ARTHROSCOPY, LEFT KNEE meniscal and  chondral debridement;  Surgeon: Carlos Barrientos MD;  Location: [facility] OR    ARTHROSCOPY KNEE WITH DEBRIDEMENT JOINT, COMBINED Right 07/25/2018    Procedure: ARTHROSCOPY KNEE WITH DEBRIDEMENT JOINT;  Right knee arthroscopy with medial meniscus debridement;  Surgeon: Seth Basilio MD;  Location: [facility]    COLONOSCOPY N/A 07/16/2019    Procedure: COLONOSCOPY;  Surgeon: Tyler Miles DO;  Location: [facility]    FRACTURE TX, ANKLE RT/LT      Fracture TX Ankle RT/LT    HERNIA REPAIR, INGUINAL RT/LT  10/13/2006    Laparoscopic left inguinal hernia repair.    OPEN REDUCTION INTERNAL FIXATION FOREARM Right 11/05/2014    Procedure: OPEN REDUCTION INTERNAL FIXATION FOREARM;  Surgeon: Seth Basilio MD;  Location: [facility]    REPAIR HAMMER TOE  10/14/2011    Procedure:REPAIR HAMMER TOE; Correction Toes 4th,5th Toe Right Foot; Surgeon:ASIF CRUM; Location:[facility]    VASECTOMY Bilateral 05/06/2005       Medications: No current facility-administered medications for this visit.    Current Outpatient Medications:     aspirin (ASA) 325 MG tablet, Take 1 tablet (325 mg) by mouth daily for 14 days, Disp: 14 tablet, Rfl: 0    HYDROcodone-acetaminophen (NORCO) 5-325 MG tablet, Take 1-2 tablets by mouth every 6 hours as needed for moderate to severe pain, Disp: 12 tablet, Rfl: 0    senna-docusate (SENOKOT-S/PERICOLACE) 8.6-50 MG tablet, Take 1-2 tablets by mouth 2 times daily, Disp: 30 tablet, Rfl: 0    Facility-Administered Medications Ordered in Other Visits:     hydrOXYzine HCl (ATARAX) tablet 25 mg, 25 mg, Oral, Once PRN, Carlos Barrientos MD    ondansetron (ZOFRAN ODT) ODT tab 4 mg, 4 mg, Oral, Once PRN, Carlos Barrientos MD    oxyCODONE (ROXICODONE) tablet 5 mg, 5 mg, Oral, Once PRN, Carlos Barrientos MD    Allergies:   Allergies   Allergen Reactions    No Known Drug Allergy        REVIEW OF SYSTEMS:   CONSTITUTIONAL:NEGATIVE for fever, chills, night sweats  INTEGUMENTARY/SKIN: NEGATIVE for worrisome  wound problems or redness.  MUSCULOSKELETAL:See HPI above  NEURO: NEGATIVE for weakness, dizziness or paresthesias    PHYSICAL EXAM:  There were no vitals taken for this visit.   GENERAL APPEARANCE: healthy, alert, no distress  SKIN: no suspicious lesions or rashes  NEURO: Normal strength and tone, mentation intact and speech normal  PSYCH:  mentation appears normal and affect normal, not anxious  RESPIRATORY: No increased work of breathing.    left  LOWER EXTREMITY:  Gait: subtle quadriceps avoidance left.  No Quad atrophy, strength normal.  Intact sensation deep peroneal nerve, superficial peroneal nerve, med/lat tibial nerve, sural nerve, saphenous nerve  Intact EHL, EDL, TA, FHL, GS, quadriceps hamstrings and hip flexors  calf soft and nttp or squeeze.  Edema: none    left  KNEE EXAM:    Skin: intact, no ecchymosis or erythema  Incisions: skin edges well approximated, sutures in place. Dry. No erythema.  ROM: full extension to full flexion  Effusion: moderate   Tender: incisional.         X-RAY: none indicated.      Impression: Carlos Keyes is a 53 year old male 2 weeks status post left knee arthroscopy and medial and lateral meniscus debridement, chondroplasty, doing well.       Plan: routine postoperative knee arthroscopy  * suture removal  Surgical images reviewed with patient today.    * WB status: weight bearing as tolerated . Cautioned not to overdo it too quickly. Increased activities too soon will lead to more swelling of the knee and leg, more pain, slower recovery. Expect 6-8 weeks.    * Rest  * Activity modification - avoid activities that aggravate symptoms.  * NSAIDS - regular use for inflammation, with food, as long as no contra-indications. Please discuss with pcp if needed.  * Ice twice daily to three times daily as needed.  * Compression wrap as needed.  * Elevation of extremity to reduce swelling as needed.  * home exercise program  for strengthening, stretching and range of motion exercises,  effusion control.  * Tylenol as needed for pain  * return to clinic  as needed.      * We did also discuss that based on the amount of arthritic changes seen at the time of surgery, may have continued knee pain due to the arthritis. Once recovered from the knee arthroscopy, and arthritic symptoms persist, consider full treatment of arthritis starting with Physical Therapy and injections, NSAIDS, activity modification, bracing.      Carlos Barrientos M.D., M.S.  Dept. of Orthopaedic Surgery  [facility]

## 2023-12-23 ASSESSMENT — ACTIVITIES OF DAILY LIVING (ADL)
ADLS_ACUITY_SCORE: 35

## 2023-12-24 ASSESSMENT — ACTIVITIES OF DAILY LIVING (ADL)
ADLS_ACUITY_SCORE: 35

## 2024-01-04 ENCOUNTER — OFFICE VISIT (OUTPATIENT)
Dept: ORTHOPEDICS | Facility: CLINIC | Age: 54
End: 2024-01-04
Payer: COMMERCIAL

## 2024-01-04 VITALS
SYSTOLIC BLOOD PRESSURE: 143 MMHG | BODY MASS INDEX: 32.51 KG/M2 | WEIGHT: 253.3 LBS | HEIGHT: 74 IN | HEART RATE: 77 BPM | DIASTOLIC BLOOD PRESSURE: 85 MMHG

## 2024-01-04 DIAGNOSIS — Z98.890 S/P ARTHROSCOPY OF LEFT KNEE: Primary | ICD-10-CM

## 2024-01-04 PROCEDURE — 99024 POSTOP FOLLOW-UP VISIT: CPT | Performed by: ORTHOPAEDIC SURGERY

## 2024-01-04 ASSESSMENT — PAIN SCALES - GENERAL: PAINLEVEL: NO PAIN (1)

## 2024-01-04 NOTE — LETTER
1/4/2024         RE: Carlos Keyes  [address]  [facility] [address]        Dear Colleague,    Thank you for referring your patient, Carlos Keyes, to the [facility] [place]. Please see a copy of my visit note below.    Chief Complaint   Patient presents with     Left Knee - Surgical Followup     Arthroscopy - M&L meniscus debridement. DOS 12/19/23, 2 wk s/p. Patient notes his knee is doing good, getting better. He has a little stiffness. No issues or concerns.       SURGERY: ( Lissa)  1. left knee, arthroscopic medial meniscus debridement   2. Left knee arthroscopy, lateral meniscus debridement  3. left knee, arthroscopic medial plica resection  4. left knee, arthroscopy shaving chondroplasty medial femoral condyle  DATE OF SURGERY: 12/19/2023.      HISTORY OF PRESENT ILLNESS:  Carlos Keyes is a 53 year old male seen for postoperative evaluation of a left knee arthroscopy and medial and lateral meniscus debridement. Surgery occurred 2 weeks ago. Returns today stating doing quite well. Pain has been improving, minimal. No problems with the surgical wounds. Denies fevers chills or night sweats. No associated numbness or tingling. Has been doing home exercise program  since surgery as recommended. Taking aspirin daily.    Some stiffness, swelling, especially when up on it more. No concerns. He is pleased. He has noticed his preop symptoms resolved.    OR FINDINGS:   no effusion  mild suprapatellar synovitis  no loose bodies in the medial and lateral gutters  intact anterior cruciate ligament within the notch  Grade I patellar chondrosis  Grade I femoral trochlea chondrosis  Grade I lateral chondrosis  Small flap tear posterior root lateral meniscus , otherwise intact  Grade II-III medial femoral condyle chondrosis  complex medial meniscus  with horizontal cleavage component and small flaps.    Past Medical History:   Diagnosis Date     Complex tear of medial meniscus of right knee  as current injury, initial encounter 08/07/2018     Toe pain 01/29/2009       Past Surgical History:   Procedure Laterality Date     ABDOMEN SURGERY  2001    hernia     ARTHROSCOPY KNEE Left 12/19/2023    Procedure: ARTHROSCOPY, LEFT KNEE meniscal and chondral debridement;  Surgeon: Carlos Barrientos MD;  Location: [facility]     ARTHROSCOPY KNEE WITH DEBRIDEMENT JOINT, COMBINED Right 07/25/2018    Procedure: ARTHROSCOPY KNEE WITH DEBRIDEMENT JOINT;  Right knee arthroscopy with medial meniscus debridement;  Surgeon: Seth Basilio MD;  [facility]     COLONOSCOPY N/A 07/16/2019    Procedure: COLONOSCOPY;  Surgeon: Tyler Miles DO;  [facility]     FRACTURE TX, ANKLE RT/LT      Fracture TX Ankle RT/LT     HERNIA REPAIR, INGUINAL RT/LT  10/13/2006    Laparoscopic left inguinal hernia repair.     OPEN REDUCTION INTERNAL FIXATION FOREARM Right 11/05/2014    Procedure: OPEN REDUCTION INTERNAL FIXATION FOREARM;  Surgeon: Seth Basilio MD;  [facility]     REPAIR HAMMER TOE  10/14/2011    Procedure:REPAIR HAMMER TOE; Correction Toes 4th,5th Toe Right Foot; Surgeon:ASIF CRUM; Location:[facility]     VASECTOMY Bilateral 05/06/2005       Medications: No current facility-administered medications for this visit.    Current Outpatient Medications:      aspirin (ASA) 325 MG tablet, Take 1 tablet (325 mg) by mouth daily for 14 days, Disp: 14 tablet, Rfl: 0     HYDROcodone-acetaminophen (NORCO) 5-325 MG tablet, Take 1-2 tablets by mouth every 6 hours as needed for moderate to severe pain, Disp: 12 tablet, Rfl: 0     senna-docusate (SENOKOT-S/PERICOLACE) 8.6-50 MG tablet, Take 1-2 tablets by mouth 2 times daily, Disp: 30 tablet, Rfl: 0    Facility-Administered Medications Ordered in Other Visits:      hydrOXYzine HCl (ATARAX) tablet 25 mg, 25 mg, Oral, Once PRN, Carlos Barrientos MD     ondansetron (ZOFRAN ODT) ODT tab 4 mg, 4 mg, Oral, Once PRN, Carlos Barrientos MD     oxyCODONE  (ROXICODONE) tablet 5 mg, 5 mg, Oral, Once PRN, Carlos Barrientos MD    Allergies:   Allergies   Allergen Reactions     No Known Drug Allergy        REVIEW OF SYSTEMS:   CONSTITUTIONAL:NEGATIVE for fever, chills, night sweats  INTEGUMENTARY/SKIN: NEGATIVE for worrisome wound problems or redness.  MUSCULOSKELETAL:See HPI above  NEURO: NEGATIVE for weakness, dizziness or paresthesias    PHYSICAL EXAM:  There were no vitals taken for this visit.   GENERAL APPEARANCE: healthy, alert, no distress  SKIN: no suspicious lesions or rashes  NEURO: Normal strength and tone, mentation intact and speech normal  PSYCH:  mentation appears normal and affect normal, not anxious  RESPIRATORY: No increased work of breathing.    left  LOWER EXTREMITY:  Gait: subtle quadriceps avoidance left.  No Quad atrophy, strength normal.  Intact sensation deep peroneal nerve, superficial peroneal nerve, med/lat tibial nerve, sural nerve, saphenous nerve  Intact EHL, EDL, TA, FHL, GS, quadriceps hamstrings and hip flexors  calf soft and nttp or squeeze.  Edema: none    left  KNEE EXAM:    Skin: intact, no ecchymosis or erythema  Incisions: skin edges well approximated, sutures in place. Dry. No erythema.  ROM: full extension to full flexion  Effusion: moderate   Tender: incisional.         X-RAY: none indicated.      Impression: Carlos Keyes is a 53 year old male 2 weeks status post left knee arthroscopy and medial and lateral meniscus debridement, chondroplasty, doing well.       Plan: routine postoperative knee arthroscopy  * suture removal  Surgical images reviewed with patient today.    * WB status: weight bearing as tolerated . Cautioned not to overdo it too quickly. Increased activities too soon will lead to more swelling of the knee and leg, more pain, slower recovery. Expect 6-8 weeks.    * Rest  * Activity modification - avoid activities that aggravate symptoms.  * NSAIDS - regular use for inflammation, with food, as long as no  contra-indications. Please discuss with pcp if needed.  * Ice twice daily to three times daily as needed.  * Compression wrap as needed.  * Elevation of extremity to reduce swelling as needed.  * home exercise program  for strengthening, stretching and range of motion exercises, effusion control.  * Tylenol as needed for pain  * return to clinic  as needed.      * We did also discuss that based on the amount of arthritic changes seen at the time of surgery, may have continued knee pain due to the arthritis. Once recovered from the knee arthroscopy, and arthritic symptoms persist, consider full treatment of arthritis starting with Physical Therapy and injections, NSAIDS, activity modification, bracing.      Carlos Barrientos M.D., M.S.  Dept. of Orthopaedic Surgery  [facility]       Again, thank you for allowing me to participate in the care of your patient.        Sincerely,        Carlos Barrientos MD

## 2024-01-10 ENCOUNTER — PATIENT OUTREACH (OUTPATIENT)
Dept: CARE COORDINATION | Facility: CLINIC | Age: 54
End: 2024-01-10
Payer: COMMERCIAL

## 2024-01-24 ENCOUNTER — PATIENT OUTREACH (OUTPATIENT)
Dept: CARE COORDINATION | Facility: CLINIC | Age: 54
End: 2024-01-24
Payer: COMMERCIAL

## 2024-01-30 ENCOUNTER — VIRTUAL VISIT (OUTPATIENT)
Dept: FAMILY MEDICINE | Facility: CLINIC | Age: 54
End: 2024-01-30
Payer: COMMERCIAL

## 2024-01-30 DIAGNOSIS — M10.00 ACUTE IDIOPATHIC GOUT, UNSPECIFIED SITE: ICD-10-CM

## 2024-01-30 PROCEDURE — 99207 PR NO CHARGE LOS: CPT | Mod: 93 | Performed by: FAMILY MEDICINE

## 2024-01-30 RX ORDER — ALLOPURINOL 100 MG/1
100 TABLET ORAL DAILY
Qty: 90 TABLET | Refills: 0 | Status: SHIPPED | OUTPATIENT
Start: 2024-01-30 | End: 2024-05-02

## 2024-01-30 NOTE — PROGRESS NOTES
Carlos is a 53 year old who is being evaluated via a billable telephone visit.      What phone number would you like to be contacted at? [phone]  How would you like to obtain your AVS? Augie    Distant Location (provider location):  On-site        Assessment & Plan     Acute idiopathic gout, unspecified site  Pt scheduled for this visit due to h/o gout, needing allopurinol refills.  Had uric acid check at physical on 2/9/23, so okay fill.  Will send in #90 without refills, and he'll schedule a physical with labs in the next couple months up in [place] at his usual clinic.  No charge for this refill visit.            Subjective   Carlos is a 53 year old, presenting for the following health issues:  Arthritis        1/30/2024    10:42 AM   Additional Questions   Roomed by Cristy STRINGER   Accompanied by self     HPI       Gout/ Single Inflamed Joint  Onset/Duration: follow up   Description:   Location: big toe - right  Joint Swelling: YES  Redness: YES  Pain: YES  Intensity: mild  Progression of Symptoms: improving  Accompanying Signs & Symptoms:  Fevers: No  History:   Trauma to the area: No  Previous history of gout: YES  Recent illness: No  Alcohol use: No  Diuretic use: No  Precipitating or alleviating factors: None  Therapies tried and outcome: allopurinol 100mg helps and has not had any flare ups since going on it, no side effects          Review of Systems  Constitutional, HEENT, cardiovascular, pulmonary, gi and gu systems are negative, except as otherwise noted.      Objective       Vitals:  No vitals were obtained today due to virtual visit.    Physical Exam   General: Alert and no distress //Respiratory: No audible wheeze, cough, or shortness of breath // Psychiatric:  Appropriate affect, tone, and pace of words            Phone call duration: 3 minutes  Signed Electronically by: Winsome Dougherty MD

## 2024-03-05 NOTE — PROGRESS NOTES
Chief Complaint   Patient presents with    Left Knee - Pain     S/p arthroscopy - lateral & medial mensicus debridement. DOS 12/19/23, 3 month s/p. Patient notes he has issues with pain in the knee.  to touch on the medial side. He has been favoring his leg. He doesn't want to walk very far anymore. He has a click in his knee. Pain is mostly anterior/medial. He has been taking a lot of ibuprofen. He usually has to stretch his knee out and have it in a certain position or he can have some pain.        SURGERY: ( Lissa)  1. left knee, arthroscopic medial meniscus debridement   2. Left knee arthroscopy, lateral meniscus debridement  3. left knee, arthroscopic medial plica resection  4. left knee, arthroscopy shaving chondroplasty medial femoral condyle  DATE OF SURGERY: 12/19/2023.      HISTORY OF PRESENT ILLNESS:  Carlos Keyes is a 53 year old male seen for postoperative evaluation of a left knee arthroscopy and medial and lateral meniscus debridement. Surgery occurred 13 weeks ago. Returns today stating he still has some tenderness along the inner aspect of the knee. He's been favoring the left leg, doesn't feel like walking for prolonged periods due to pain. Treatment with ibuprofen, stretching, moving to certain positions.     He's been more active, a lot of up/down, bending this past weekend caused increased pain. He works in auto repair.      OR FINDINGS:   no effusion  mild suprapatellar synovitis  no loose bodies in the medial and lateral gutters  intact anterior cruciate ligament within the notch  Grade I patellar chondrosis  Grade I femoral trochlea chondrosis  Grade I lateral chondrosis  Small flap tear posterior root lateral meniscus , otherwise intact  Grade II-III medial femoral condyle chondrosis  complex medial meniscus  with horizontal cleavage component and small flaps.    Past Medical History:   Diagnosis Date    Complex tear of medial meniscus of right knee as current injury, initial  "encounter 08/07/2018    Toe pain 01/29/2009       Past Surgical History:   Procedure Laterality Date    ABDOMEN SURGERY  2001    hernia    ARTHROSCOPY KNEE Left 12/19/2023    Procedure: ARTHROSCOPY, LEFT KNEE meniscal and chondral debridement;  Surgeon: Carlos Barrientos MD;  Location: [facility]    ARTHROSCOPY KNEE WITH DEBRIDEMENT JOINT, COMBINED Right 07/25/2018    Procedure: ARTHROSCOPY KNEE WITH DEBRIDEMENT JOINT;  Right knee arthroscopy with medial meniscus debridement;  Surgeon: Seth Basilio MD;  [facility]    COLONOSCOPY N/A 07/16/2019    Procedure: COLONOSCOPY;  Surgeon: Tyler Miles DO;  [facility]    FRACTURE TX, ANKLE RT/LT      Fracture TX Ankle RT/LT    HERNIA REPAIR, INGUINAL RT/LT  10/13/2006    Laparoscopic left inguinal hernia repair.    OPEN REDUCTION INTERNAL FIXATION FOREARM Right 11/05/2014    Procedure: OPEN REDUCTION INTERNAL FIXATION FOREARM;  Surgeon: Seth Basilio MD;  [facility]    REPAIR HAMMER TOE  10/14/2011    Procedure:REPAIR HAMMER TOE; Correction Toes 4th,5th Toe Right Foot; Surgeon:ASIF CRUM; [facility]    VASECTOMY Bilateral 05/06/2005       Medications:   Current Outpatient Medications:     allopurinol (ZYLOPRIM) 100 MG tablet, Take 1 tablet (100 mg) by mouth daily, Disp: 90 tablet, Rfl: 0    simvastatin (ZOCOR) 20 MG tablet, TAKE ONE TABLET BY MOUTH AT BEDTIME, Disp: 90 tablet, Rfl: 3    Allergies:   Allergies   Allergen Reactions    No Known Drug Allergy        REVIEW OF SYSTEMS:   CONSTITUTIONAL:NEGATIVE for fever, chills, night sweats  INTEGUMENTARY/SKIN: NEGATIVE for worrisome wound problems or redness.  MUSCULOSKELETAL:See HPI above  NEURO: NEGATIVE for weakness, dizziness or paresthesias    PHYSICAL EXAM:  BP (!) 147/93   Ht 1.88 m (6' 2.02\")   Wt 113 kg (249 lb 1.6 oz)   BMI 31.97 kg/m     GENERAL APPEARANCE: healthy, alert, no distress  SKIN: no suspicious lesions or rashes  NEURO: Normal strength and tone, " mentation intact and speech normal  PSYCH:  mentation appears normal and affect normal, not anxious  RESPIRATORY: No increased work of breathing.    left  LOWER EXTREMITY:  Gait: normal.  No Quad atrophy, strength normal.  Intact sensation deep peroneal nerve, superficial peroneal nerve, med/lat tibial nerve, sural nerve, saphenous nerve  Intact EHL, EDL, TA, FHL, GS, quadriceps hamstrings and hip flexors  calf soft and nttp or squeeze.  Edema: none    left  KNEE EXAM:    Skin: intact, no ecchymosis or erythema  Incisions: well healed. Dry. No erythema.  ROM: full extension to full flexion  Effusion: none  Tender: medial joint line.  Mcmurrays negative.        X-RAY: none indicated.      Impression: Carlos Keyes is a 53 year old male 13 weeks status post left knee arthroscopy and medial and lateral meniscus debridement, chondroplasty, knee primary osteoarthritis.      Plan: routine postoperative knee arthroscopy  Surgical images reviewed with patient again today, showing known osteoarthritis at time of surgery, which we discussed postoperative visit.    Non-surgical treatment for knee arthritis includes:    * rest, sitting  * Activity modification - avoid impact activities or activities that aggravate symptoms.  * NSAIDS (non-steroidal anti-inflammatory medications; e.g. Aleve, advil, motrin, ibuprofen) - regular use for inflammation ( twice daily or three times daily), with food, as long as no contra-indications Please discuss with primary care doctor if needed  * ice, 15-20 minutes at a time several times a day or as needed.  * Strengthening of quadriceps muscles  * Physical Therapy for strengthening, stretching and range of motion exercises of legs  * Tylenol as needed for pain, consider Tylenol arthritis or similar  * Weight loss: Weight loss:  Body mass index is 31.97 kg/m .. weight loss benefits, not only for the current pain symptoms, but also overall health. Recommend a good diet plan that works for the  "patient, with the assistance of a dietician or primary care doctor as needed. Also, a good, low-impact exercise program for at least 20 minutes per day, 3 times per week, such as exercise bike, elliptical , or pool.  * Exercise: low impact such as stationary bike, elliptical, pool.  * Injections: cortisone versus viscosupplementation (hyaluronic acid, \"rooster comb\", \"gel shots\"); risks and perceived benefits discussed today. Patient elects NOT to proceed.  * Bracing: bracing the knee may offer some relief of symptoms when worn and provide some stability.  * over the counter supplements such as glucosamine and chondroitin sulfate may help with joint pain.  * topical ointments may help as well    * return to clinic as needed.       Carlos Barrientos M.D., M.S.  Dept. of Orthopaedic Surgery  [facility]   "

## 2024-03-16 ENCOUNTER — HEALTH MAINTENANCE LETTER (OUTPATIENT)
Age: 54
End: 2024-03-16

## 2024-03-18 ENCOUNTER — OFFICE VISIT (OUTPATIENT)
Dept: ORTHOPEDICS | Facility: CLINIC | Age: 54
End: 2024-03-18
Payer: COMMERCIAL

## 2024-03-18 VITALS
DIASTOLIC BLOOD PRESSURE: 93 MMHG | BODY MASS INDEX: 31.97 KG/M2 | WEIGHT: 249.1 LBS | HEIGHT: 74 IN | SYSTOLIC BLOOD PRESSURE: 147 MMHG

## 2024-03-18 DIAGNOSIS — M25.562 CHRONIC PAIN OF LEFT KNEE: Primary | ICD-10-CM

## 2024-03-18 DIAGNOSIS — G89.29 CHRONIC PAIN OF LEFT KNEE: Primary | ICD-10-CM

## 2024-03-18 DIAGNOSIS — M17.12 PRIMARY OSTEOARTHRITIS OF LEFT KNEE: ICD-10-CM

## 2024-03-18 DIAGNOSIS — Z98.890 S/P ARTHROSCOPY OF LEFT KNEE: ICD-10-CM

## 2024-03-18 PROCEDURE — 99024 POSTOP FOLLOW-UP VISIT: CPT | Performed by: ORTHOPAEDIC SURGERY

## 2024-03-18 ASSESSMENT — PAIN SCALES - GENERAL: PAINLEVEL: MILD PAIN (2)

## 2024-03-18 NOTE — LETTER
3/18/2024         RE: Carlos Keyes  [address]  [facility] [address]        Dear Colleague,    Thank you for referring your patient, Carlos Keyes, to the [facility] [place]. Please see a copy of my visit note below.    Chief Complaint   Patient presents with     Left Knee - Pain     S/p arthroscopy - lateral & medial mensicus debridement. DOS 12/19/23, 3 month s/p. Patient notes he has issues with pain in the knee.  to touch on the medial side. He has been favoring his leg. He doesn't want to walk very far anymore. He has a click in his knee. Pain is mostly anterior/medial. He has been taking a lot of ibuprofen. He usually has to stretch his knee out and have it in a certain position or he can have some pain.        SURGERY: ( Lissa)  1. left knee, arthroscopic medial meniscus debridement   2. Left knee arthroscopy, lateral meniscus debridement  3. left knee, arthroscopic medial plica resection  4. left knee, arthroscopy shaving chondroplasty medial femoral condyle  DATE OF SURGERY: 12/19/2023.      HISTORY OF PRESENT ILLNESS:  Carlos Keyes is a 53 year old male seen for postoperative evaluation of a left knee arthroscopy and medial and lateral meniscus debridement. Surgery occurred 13 weeks ago. Returns today stating he still has some tenderness along the inner aspect of the knee. He's been favoring the left leg, doesn't feel like walking for prolonged periods due to pain. Treatment with ibuprofen, stretching, moving to certain positions.     He's been more active, a lot of up/down, bending this past weekend caused increased pain. He works in auto repair.      OR FINDINGS:   no effusion  mild suprapatellar synovitis  no loose bodies in the medial and lateral gutters  intact anterior cruciate ligament within the notch  Grade I patellar chondrosis  Grade I femoral trochlea chondrosis  Grade I lateral chondrosis  Small flap tear posterior root lateral meniscus ,  otherwise intact  Grade II-III medial femoral condyle chondrosis  complex medial meniscus  with horizontal cleavage component and small flaps.    Past Medical History:   Diagnosis Date     Complex tear of medial meniscus of right knee as current injury, initial encounter 08/07/2018     Toe pain 01/29/2009       Past Surgical History:   Procedure Laterality Date     ABDOMEN SURGERY  2001    hernia     ARTHROSCOPY KNEE Left 12/19/2023    Procedure: ARTHROSCOPY, LEFT KNEE meniscal and chondral debridement;  Surgeon: Carlos Barrientos MD;  [facility]: [facility]     ARTHROSCOPY KNEE WITH DEBRIDEMENT JOINT, COMBINED Right 07/25/2018    Procedure: ARTHROSCOPY KNEE WITH DEBRIDEMENT JOINT;  Right knee arthroscopy with medial meniscus debridement;  Surgeon: Seth Basilio MD;  [facility]     COLONOSCOPY N/A 07/16/2019    Procedure: COLONOSCOPY;  Surgeon: Tyler Miles DO;  [facility]     FRACTURE TX, ANKLE RT/LT      Fracture TX Ankle RT/LT     HERNIA REPAIR, INGUINAL RT/LT  10/13/2006    Laparoscopic left inguinal hernia repair.     OPEN REDUCTION INTERNAL FIXATION FOREARM Right 11/05/2014    Procedure: OPEN REDUCTION INTERNAL FIXATION FOREARM;  Surgeon: Seth Basilio MD;  [facility]     REPAIR HAMMER TOE  10/14/2011    Procedure:REPAIR HAMMER TOE; Correction Toes 4th,5th Toe Right Foot; Surgeon:ASIF CRUM; Location:[facility]     VASECTOMY Bilateral 05/06/2005       Medications:   Current Outpatient Medications:      allopurinol (ZYLOPRIM) 100 MG tablet, Take 1 tablet (100 mg) by mouth daily, Disp: 90 tablet, Rfl: 0     simvastatin (ZOCOR) 20 MG tablet, TAKE ONE TABLET BY MOUTH AT BEDTIME, Disp: 90 tablet, Rfl: 3    Allergies:   Allergies   Allergen Reactions     No Known Drug Allergy        REVIEW OF SYSTEMS:   CONSTITUTIONAL:NEGATIVE for fever, chills, night sweats  INTEGUMENTARY/SKIN: NEGATIVE for worrisome wound problems or redness.  MUSCULOSKELETAL:See HPI above  NEURO:  "NEGATIVE for weakness, dizziness or paresthesias    PHYSICAL EXAM:  BP (!) 147/93   Ht 1.88 m (6' 2.02\")   Wt 113 kg (249 lb 1.6 oz)   BMI 31.97 kg/m     GENERAL APPEARANCE: healthy, alert, no distress  SKIN: no suspicious lesions or rashes  NEURO: Normal strength and tone, mentation intact and speech normal  PSYCH:  mentation appears normal and affect normal, not anxious  RESPIRATORY: No increased work of breathing.    left  LOWER EXTREMITY:  Gait: normal.  No Quad atrophy, strength normal.  Intact sensation deep peroneal nerve, superficial peroneal nerve, med/lat tibial nerve, sural nerve, saphenous nerve  Intact EHL, EDL, TA, FHL, GS, quadriceps hamstrings and hip flexors  calf soft and nttp or squeeze.  Edema: none    left  KNEE EXAM:    Skin: intact, no ecchymosis or erythema  Incisions: well healed. Dry. No erythema.  ROM: full extension to full flexion  Effusion: none  Tender: medial joint line.  Mcmurrays negative.        X-RAY: none indicated.      Impression: Carlos Keyes is a 53 year old male 13 weeks status post left knee arthroscopy and medial and lateral meniscus debridement, chondroplasty, knee primary osteoarthritis.      Plan: routine postoperative knee arthroscopy  Surgical images reviewed with patient again today, showing known osteoarthritis at time of surgery, which we discussed postoperative visit.    Non-surgical treatment for knee arthritis includes:    * rest, sitting  * Activity modification - avoid impact activities or activities that aggravate symptoms.  * NSAIDS (non-steroidal anti-inflammatory medications; e.g. Aleve, advil, motrin, ibuprofen) - regular use for inflammation ( twice daily or three times daily), with food, as long as no contra-indications Please discuss with primary care doctor if needed  * ice, 15-20 minutes at a time several times a day or as needed.  * Strengthening of quadriceps muscles  * Physical Therapy for strengthening, stretching and range of motion " "exercises of legs  * Tylenol as needed for pain, consider Tylenol arthritis or similar  * Weight loss: Weight loss:  Body mass index is 31.97 kg/m .. weight loss benefits, not only for the current pain symptoms, but also overall health. Recommend a good diet plan that works for the patient, with the assistance of a dietician or primary care doctor as needed. Also, a good, low-impact exercise program for at least 20 minutes per day, 3 times per week, such as exercise bike, elliptical , or pool.  * Exercise: low impact such as stationary bike, elliptical, pool.  * Injections: cortisone versus viscosupplementation (hyaluronic acid, \"rooster comb\", \"gel shots\"); risks and perceived benefits discussed today. Patient elects NOT to proceed.  * Bracing: bracing the knee may offer some relief of symptoms when worn and provide some stability.  * over the counter supplements such as glucosamine and chondroitin sulfate may help with joint pain.  * topical ointments may help as well    * return to clinic as needed.       Carlos Barrientos M.D., M.S.  Dept. of Orthopaedic Surgery  [facility]       Again, thank you for allowing me to participate in the care of your patient.        Sincerely,        Carlos Barrientos MD  "

## 2024-04-04 SDOH — HEALTH STABILITY: PHYSICAL HEALTH: ON AVERAGE, HOW MANY DAYS PER WEEK DO YOU ENGAGE IN MODERATE TO STRENUOUS EXERCISE (LIKE A BRISK WALK)?: 3 DAYS

## 2024-04-04 SDOH — HEALTH STABILITY: PHYSICAL HEALTH: ON AVERAGE, HOW MANY MINUTES DO YOU ENGAGE IN EXERCISE AT THIS LEVEL?: 30 MIN

## 2024-04-04 ASSESSMENT — SOCIAL DETERMINANTS OF HEALTH (SDOH): HOW OFTEN DO YOU GET TOGETHER WITH FRIENDS OR RELATIVES?: PATIENT DECLINED

## 2024-04-09 ENCOUNTER — OFFICE VISIT (OUTPATIENT)
Dept: FAMILY MEDICINE | Facility: CLINIC | Age: 54
End: 2024-04-09
Payer: COMMERCIAL

## 2024-04-09 VITALS
OXYGEN SATURATION: 95 % | HEIGHT: 74 IN | WEIGHT: 247 LBS | DIASTOLIC BLOOD PRESSURE: 88 MMHG | SYSTOLIC BLOOD PRESSURE: 138 MMHG | RESPIRATION RATE: 18 BRPM | HEART RATE: 84 BPM | TEMPERATURE: 97.7 F | BODY MASS INDEX: 31.7 KG/M2

## 2024-04-09 DIAGNOSIS — Z00.00 ROUTINE GENERAL MEDICAL EXAMINATION AT A HEALTH CARE FACILITY: Primary | ICD-10-CM

## 2024-04-09 DIAGNOSIS — E66.09 CLASS 1 OBESITY DUE TO EXCESS CALORIES WITHOUT SERIOUS COMORBIDITY WITH BODY MASS INDEX (BMI) OF 31.0 TO 31.9 IN ADULT: ICD-10-CM

## 2024-04-09 DIAGNOSIS — Z82.49 FAMILY HISTORY OF ISCHEMIC HEART DISEASE: ICD-10-CM

## 2024-04-09 DIAGNOSIS — E78.5 HYPERLIPIDEMIA LDL GOAL <130: ICD-10-CM

## 2024-04-09 DIAGNOSIS — E66.811 CLASS 1 OBESITY DUE TO EXCESS CALORIES WITHOUT SERIOUS COMORBIDITY WITH BODY MASS INDEX (BMI) OF 31.0 TO 31.9 IN ADULT: ICD-10-CM

## 2024-04-09 DIAGNOSIS — M1A.09X0 IDIOPATHIC CHRONIC GOUT OF MULTIPLE SITES WITHOUT TOPHUS: ICD-10-CM

## 2024-04-09 LAB
ALBUMIN SERPL BCG-MCNC: 4.8 G/DL (ref 3.5–5.2)
ALP SERPL-CCNC: 61 U/L (ref 40–150)
ALT SERPL W P-5'-P-CCNC: 45 U/L (ref 0–70)
ANION GAP SERPL CALCULATED.3IONS-SCNC: 10 MMOL/L (ref 7–15)
AST SERPL W P-5'-P-CCNC: 38 U/L (ref 0–45)
BILIRUB SERPL-MCNC: 0.4 MG/DL
BUN SERPL-MCNC: 10.5 MG/DL (ref 6–20)
CALCIUM SERPL-MCNC: 9.7 MG/DL (ref 8.6–10)
CHLORIDE SERPL-SCNC: 104 MMOL/L (ref 98–107)
CHOLEST SERPL-MCNC: 229 MG/DL
CREAT SERPL-MCNC: 0.93 MG/DL (ref 0.67–1.17)
DEPRECATED HCO3 PLAS-SCNC: 28 MMOL/L (ref 22–29)
EGFRCR SERPLBLD CKD-EPI 2021: >90 ML/MIN/1.73M2
FASTING STATUS PATIENT QL REPORTED: YES
GLUCOSE SERPL-MCNC: 95 MG/DL (ref 70–99)
HBV SURFACE AB SERPL IA-ACNC: <3.5 M[IU]/ML
HBV SURFACE AB SERPL IA-ACNC: NONREACTIVE M[IU]/ML
HDLC SERPL-MCNC: 66 MG/DL
LDLC SERPL CALC-MCNC: 111 MG/DL
NONHDLC SERPL-MCNC: 163 MG/DL
POTASSIUM SERPL-SCNC: 3.8 MMOL/L (ref 3.4–5.3)
PROT SERPL-MCNC: 8.1 G/DL (ref 6.4–8.3)
PSA SERPL DL<=0.01 NG/ML-MCNC: 0.52 NG/ML (ref 0–3.5)
SODIUM SERPL-SCNC: 142 MMOL/L (ref 135–145)
TRIGL SERPL-MCNC: 259 MG/DL
URATE SERPL-MCNC: 5.9 MG/DL (ref 3.4–7)

## 2024-04-09 PROCEDURE — 80061 LIPID PANEL: CPT | Performed by: FAMILY MEDICINE

## 2024-04-09 PROCEDURE — 84550 ASSAY OF BLOOD/URIC ACID: CPT | Performed by: FAMILY MEDICINE

## 2024-04-09 PROCEDURE — G0103 PSA SCREENING: HCPCS | Performed by: FAMILY MEDICINE

## 2024-04-09 PROCEDURE — 80053 COMPREHEN METABOLIC PANEL: CPT | Performed by: FAMILY MEDICINE

## 2024-04-09 PROCEDURE — 99213 OFFICE O/P EST LOW 20 MIN: CPT | Mod: 25 | Performed by: FAMILY MEDICINE

## 2024-04-09 PROCEDURE — 86706 HEP B SURFACE ANTIBODY: CPT | Performed by: FAMILY MEDICINE

## 2024-04-09 PROCEDURE — 36415 COLL VENOUS BLD VENIPUNCTURE: CPT | Performed by: FAMILY MEDICINE

## 2024-04-09 PROCEDURE — 99396 PREV VISIT EST AGE 40-64: CPT | Performed by: FAMILY MEDICINE

## 2024-04-09 ASSESSMENT — PAIN SCALES - GENERAL: PAINLEVEL: NO PAIN (0)

## 2024-04-09 NOTE — PATIENT INSTRUCTIONS
Preventive Care Advice   This is general advice given by our system to help you stay healthy. However, your care team may have specific advice just for you. Please talk to your care team about your preventive care needs.  Nutrition  Eat 5 or more servings of fruits and vegetables each day.  Try wheat bread, brown rice and whole grain pasta (instead of white bread, rice, and pasta).  Get enough calcium and vitamin D. Check the label on foods and aim for 100% of the RDA (recommended daily allowance).  Lifestyle  Exercise at least 150 minutes each week   (30 minutes a day, 5 days a week).  Do muscle strengthening activities 2 days a week. These help control your weight and prevent disease.  No smoking.  Wear sunscreen to prevent skin cancer.  Have a dental exam and cleaning every 6 months.  Yearly exams  See your health care team every year to talk about:  Any changes in your health.  Any medicines your care team has prescribed.  Preventive care, family planning, and ways to prevent chronic diseases.  Shots (vaccines)   HPV shots (up to age 26), if you've never had them before.  Hepatitis B shots (up to age 59), if you've never had them before.  COVID-19 shot: Get this shot when it's due.  Flu shot: Get a flu shot every year.  Tetanus shot: Get a tetanus shot every 10 years.  Pneumococcal, hepatitis A, and RSV shots: Ask your care team if you need these based on your risk.  Shingles shot (for age 50 and up).  General health tests  Diabetes screening:  Starting at age 35, Get screened for diabetes at least every 3 years.  If you are younger than age 35, ask your care team if you should be screened for diabetes.  Cholesterol test: At age 39, start having a cholesterol test every 5 years, or more often if advised.  Bone density scan (DEXA): At age 50, ask your care team if you should have this scan for osteoporosis (brittle bones).  Hepatitis C: Get tested at least once in your life.  STIs (sexually transmitted  infections)  Before age 24: Ask your care team if you should be screened for STIs.  After age 24: Get screened for STIs if you're at risk. You are at risk for STIs (including HIV) if:  You are sexually active with more than one person.  You don't use condoms every time.  You or a partner was diagnosed with a sexually transmitted infection.  If you are at risk for HIV, ask about PrEP medicine to prevent HIV.  Get tested for HIV at least once in your life, whether you are at risk for HIV or not.  Cancer screening tests  Cervical cancer screening: If you have a cervix, begin getting regular cervical cancer screening tests at age 21. Most people who have regular screenings with normal results can stop after age 65. Talk about this with your provider.  Breast cancer scan (mammogram): If you've ever had breasts, begin having regular mammograms starting at age 40. This is a scan to check for breast cancer.  Colon cancer screening: It is important to start screening for colon cancer at age 45.  Have a colonoscopy test every 10 years (or more often if you're at risk) Or, ask your provider about stool tests like a FIT test every year or Cologuard test every 3 years.  To learn more about your testing options, visit: https://www.[employer]/410417.pdf.  For help making a decision, visit: https://bit.ly/nn94684.  Prostate cancer screening test: If you have a prostate and are age 55 to 69, ask your provider if you would benefit from a yearly prostate cancer screening test.  Lung cancer screening: If you are a current or former smoker age 50 to 80, ask your care team if ongoing lung cancer screenings are right for you.  For informational purposes only. Not to replace the advice of your health care provider. Copyright   2023 [employer]. All rights reserved. Clinically reviewed by the [facility]. [employer] 012886 - REV 01/24.

## 2024-04-09 NOTE — PROGRESS NOTES
Preventive Care Visit  [facility]  Narciso Mancia Mai, MD, Family Medicine  Apr 9, 2024      Assessment & Plan     (Z00.00) Routine general medical examination at a health care facility  (primary encounter diagnosis)  Comment: Overall Carlos is healthy and is doing well.  Discussed about safety issue, healthy diet, exercising, weight management, sleeping hygiene, advanced directive care, falling prevention, and depression prevention.  Emphasized importance of updating his to advance directive care; its information was given today and he was encouraged to have it updated as soon as possible.  Also recommended to take it easy especially with positional change to prevent falling.  Hallways in his house should be clear of objects and well lighted at all times.  Recommended daily exercise for at least 30 minutes, more as tolerated.  Healthy diet with adequate fluid intake and resting discussed and recommended.  No safety or mental health concerns.  Follow in 1 year for physical, earlier as needed.  Labs as ordered and results reviewed.  All of his questions were answered.     Immunization:  UTD except COVID and influenza vaccination which was recommended and offered but he declined.  Risks and benefits discussed and he is willing to take the risk.  Also check for his hepatitis B immunity and he was recommended to get the hepatitis B vaccination if its immunity is negative.    Colon cancer screening:  UTD.  Last colonoscopy was in 2019 and it was normal, he was cleared for 10 years.    Prostate cancer screening:  Discussed with him about the pros and cons of prostate screening cancer with PSA.  Also educated about the potential false positive which may require unnecessary work-up.  He was informed of negative/normal PSA leve does not rule out prostate cancer completely although it is very unlikely.  He understands and is willing to take the risk.     STD: Denied of STD risk and declined it screening  today    Skin cancer prevention: Above the waist skin exam today was normal.  Declined below the waist skin exam.  He was encouraged to monitor and follow-up if noted skin lesions with changes in size, shape, color or texture.  He was also encouraged to be generous with sunblock and/or sun protective gear when he is in the sun for a longer period of time.      Plan: Hepatitis B Surface Antibody, Comprehensive         metabolic panel (BMP + Alb, Alk Phos, ALT, AST,        Total. Bili, TP), PSA, screen            (E78.5) Hyperlipidemia LDL goal <160  Comment: Otherwise healthy.  No history of HTN, DM, CAD, CVA or PAD.  The goal for his LDL is to be less than 160.  Tolerating Lipitor well.  No history of liver disease and his liver enzymes today was normal.  Denies of excessive alcohol intake.  His cholesterol level today was also therapeutic.  No change in medication.  Exercising, healthy diet and weight management discussed and encouraged as above.  Recheck the CMP and the cholesterol level in a year.    Plan: Lipid panel reflex to direct LDL Non-fasting            (M1A.09X0) Idiopathic chronic gout of multiple sites without tophus  Comment: Doing well with the allopurinol.  His uric acid level today was 5.9.  Has not had a flareup since starting the allopurinol.  Discussed about stopping it to see how he does but declined; he preferred to stay on it.  Food to avoid also discussed, especially with alcohol.  Symptoms that need be seen or call in discussed.  His kidney function today was normal.    Plan: Uric acid            (Z82.49) Family history of ischemic heart disease  Comment: His mother  of MI at the age of 53.  He is overall healthy; his cholesterol level is controlled; he is mildly obese.  I informed him that because of the strong family history of premature heart disease, he is at higher risk for premature heart disease as well and therefore it is extremely importance to screen and control all of his  "modifiable risks.  I encouraged him to work on weight loss with exercising and healthy diet.  I encouraged him to keep up the good work with no tobacco use drug use.  He should limit to no more than 1-2 drink of alcohol a day.  Also recommended aggressively screening and treating for diabetes, high blood pressure and PAD at least annually.  Symptoms that need to be seen to call and discuss.  I would have a low threshold to screen for heart disease if he develops any cardiac symptoms.  His last stress echocardiogram in 2014 was normal.      Patient has been advised of split billing requirements and indicates understanding: Yes        BMI  Estimated body mass index is 31.7 kg/m  as calculated from the following:    Height as of this encounter: 1.88 m (6' 2.02\").    Weight as of this encounter: 112 kg (247 lb).   Weight management plan: Discussed healthy diet and exercise guidelines    Counseling  Appropriate preventive services were discussed with this patient, including applicable screening as appropriate for fall prevention, nutrition, physical activity, Tobacco-use cessation, weight loss and cognition.  Checklist reviewing preventive services available has been given to the patient.      Work on weight loss  Regular exercise    Lane Gonzalez is a 53 year old, presenting for the following:  Physical        4/9/2024     7:10 AM   Additional Questions   Roomed by [facility] Care Directive  Patient does not have a Health Care Directive or Living Will: Discussed advance care planning with patient; information given to patient to review.    RIKC Gonzalez is here today for his general physical, establish care and general follow-up.  Generally is doing well, no specific concerns today.  He takes allopurinol for gout and simvastatin for high cholesterol.  Tolerating the medications well, no side effect.  Been on these medications for a while.  Since starting the allopurinol, has had not any gouty attack or " flareup.  No headache or dizziness.  No chest pain or shortness of breath.  No muscle or joint pain from the simvastatin.  No history of liver disease or elevated liver enzymes.  No excessive alcohol intake.  Not exercising but he typically tries to keep himself busy throughout the day.    No major medical care or procedure done since the last physical over a year ago. No headache, dizziness or acute visual change. No runny nose, nasal congestion, coughing, fever or chill.  No CP/SOB. No N/V/D/C or problem with urination.  Denied of nocturia.  No problem with erection.  Denied of STD risk.  No leg swelling, dyspnea or orthopnea.  No joint or muscle pain.  Drinks couple beers a day, no drugs.  Never smoked, used to chew tobacco but quit many years ago.  No problem with sleeping.  Feels safety or mental health concerns.  She lives with his wife and children.  No weight change. No other concern today           4/4/2024   General Health   How would you rate your overall physical health? Good   Feel stress (tense, anxious, or unable to sleep) Not at all         4/4/2024   Nutrition   Three or more servings of calcium each day? Yes   Diet: Regular (no restrictions)   How many servings of fruit and vegetables per day? (!) 0-1   How many sweetened beverages each day? 0-1         4/4/2024   Exercise   Days per week of moderate/strenous exercise 3 days   Average minutes spent exercising at this level 30 min         4/4/2024   Social Factors   Frequency of gathering with friends or relatives Patient declined   Worry food won't last until get money to buy more No   Food not last or not have enough money for food? No   Do you have housing?  Yes   Are you worried about losing your housing? No   Lack of transportation? No   Unable to get utilities (heat,electricity)? No         4/4/2024   Fall Risk   Fallen 2 or more times in the past year? No   Trouble with walking or balance? No          4/4/2024   Dental   Dentist two times  every year? Yes         2024   TB Screening   Were you born outside of the [place]? No           Today's PHQ-2 Score:       2024     3:08 PM   PHQ-2 (  [employer])   Q1: Little interest or pleasure in doing things 0   Q2: Feeling down, depressed or hopeless 0   PHQ-2 Score 0         2024   Substance Use   Alcohol more than 3/day or more than 7/wk No   Do you use any other substances recreationally? No     Social History     Tobacco Use    Smoking status: Never    Smokeless tobacco: Former     Types: Chew     Quit date: 2015   Vaping Use    Vaping status: Never Used   Substance Use Topics    Alcohol use: Yes     Comment: daily 2 drinks    Drug use: No           2024   STI Screening   New sexual partner(s) since last STI/HIV test? No   Last PSA:   PSA   Date Value Ref Range Status   2021 0.91 0 - 4 ug/L Final     Comment:     Assay Method:  Chemiluminescence using Siemens Vista analyzer     Prostate Specific Antigen Screen   Date Value Ref Range Status   2024 0.52 0.00 - 3.50 ng/mL Final     ASCVD Risk   The 10-year ASCVD risk score (Terri PAREDES, et al., 2019) is: 5.8%    Values used to calculate the score:      Age: 53 years      Sex: Male      Is Non- : No      Diabetic: No      Tobacco smoker: No      Systolic Blood Pressure: 138 mmHg      Is BP treated: No      HDL Cholesterol: 66 mg/dL      Total Cholesterol: 229 mg/dL    Fracture Risk Assessment Tool  Link to Frax Calculator  Use the information below to complete the Frax calculator  : 1970  Sex: male  Weight (kg): 112 kg (actual weight)  Height (cm): 188 cm  Previous Fragility Fracture:  Yes  History of parent with fractured hip:  No  Current Smoking:  No  Patient has been on glucocorticoids for more than 3 months (5mg/day or more): No  Rheumatoid Arthritis on Problem List:  No  Secondary Osteoporosis on Problem List:  No  Consumes 3 or more units of alcohol per day: No  Femoral Neck BMD (g/cm2)            Reviewed and updated as needed this visit by Provider   Tobacco  Allergies   Problems  Med Hx  Surg Hx  Fam Hx  Soc Hx   Sexual Activity          Past Medical History:   Diagnosis Date    Complex tear of medial meniscus of right knee as current injury, initial encounter 08/07/2018    Toe pain 01/29/2009     Past Surgical History:   Procedure Laterality Date    ARTHROSCOPY KNEE Left 12/19/2023    Procedure: ARTHROSCOPY, LEFT KNEE meniscal and chondral debridement;  Surgeon: Carlos Barrientos MD;  Location: [facility]    ARTHROSCOPY KNEE WITH DEBRIDEMENT JOINT, COMBINED Right 07/25/2018    Procedure: ARTHROSCOPY KNEE WITH DEBRIDEMENT JOINT;  Right knee arthroscopy with medial meniscus debridement;  Surgeon: Seth Basilio MD;  Location: [facility]    COLONOSCOPY N/A 07/16/2019    Procedure: COLONOSCOPY;  Surgeon: Tyler Miles DO;  Location: [facility]    FRACTURE TX, ANKLE RT/LT      Fracture TX Ankle RT/LT    HERNIA REPAIR, INGUINAL RT/LT  10/13/2006    Laparoscopic left inguinal hernia repair.    OPEN REDUCTION INTERNAL FIXATION FOREARM Right 11/05/2014    Procedure: OPEN REDUCTION INTERNAL FIXATION FOREARM;  Surgeon: Seth Basilio MD;  Location: [facility]    REPAIR HAMMER TOE  10/14/2011    Procedure:REPAIR HAMMER TOE; Correction Toes 4th,5th Toe Right Foot; Surgeon:ASIF CRUM; Location:[facility]    VASECTOMY Bilateral 05/06/2005     Patient Active Problem List   Diagnosis    HYPERLIPIDEMIA LDL GOAL <130    Family history of ischemic heart disease    S/P arthroscopy of right knee    Idiopathic chronic gout of multiple sites without tophus     Past Surgical History:   Procedure Laterality Date    ARTHROSCOPY KNEE Left 12/19/2023    Procedure: ARTHROSCOPY, LEFT KNEE meniscal and chondral debridement;  Surgeon: Carlos Barrientos MD;  Location: [facility]    ARTHROSCOPY KNEE WITH DEBRIDEMENT JOINT, COMBINED Right 07/25/2018    Procedure: ARTHROSCOPY KNEE WITH DEBRIDEMENT JOINT;  Right knee  arthroscopy with medial meniscus debridement;  Surgeon: Seth Basilio MD;  [facility]    COLONOSCOPY N/A 2019    Procedure: COLONOSCOPY;  Surgeon: Tyler Miles DO;  [facility]    FRACTURE TX, ANKLE RT/LT      Fracture TX Ankle RT/LT    HERNIA REPAIR, INGUINAL RT/LT  10/13/2006    Laparoscopic left inguinal hernia repair.    OPEN REDUCTION INTERNAL FIXATION FOREARM Right 2014    Procedure: OPEN REDUCTION INTERNAL FIXATION FOREARM;  Surgeon: Seth Basilio MD;  [facility]    REPAIR HAMMER TOE  10/14/2011    Procedure:REPAIR HAMMER TOE; Correction Toes 4th,5th Toe Right Foot; Surgeon:ASIF CRUM; Location:[facility]    VASECTOMY Bilateral 2005       Social History     Tobacco Use    Smoking status: Never    Smokeless tobacco: Former     Types: Chew     Quit date: 2015   Substance Use Topics    Alcohol use: Yes     Comment: daily 2 drinks     Family History   Problem Relation Age of Onset    Coronary Artery Disease Mother          of MI at 55    Hyperlipidemia Mother     Hyperlipidemia Father     No Known Problems Sister     No Known Problems Sister     No Known Problems Brother     C.A.D. Maternal Grandfather     No Known Problems Son     No Known Problems Daughter     No Known Problems Daughter     Diabetes No family hx of     Cancer - colorectal No family hx of          Current Outpatient Medications   Medication Sig Dispense Refill    allopurinol (ZYLOPRIM) 100 MG tablet Take 1 tablet (100 mg) by mouth daily 90 tablet 0    simvastatin (ZOCOR) 20 MG tablet TAKE ONE TABLET BY MOUTH AT BEDTIME 90 tablet 3     Allergies   Allergen Reactions    No Known Drug Allergy          Review of Systems  Constitutional, HEENT, cardiovascular, pulmonary, GI, , musculoskeletal, neuro, skin, endocrine and psych systems are negative, except as otherwise noted.     Objective    Exam  /88 (Cuff Size: Adult Regular)   Pulse 84   Temp 97.7  F (36.5  C)  "(Temporal)   Resp 18   Ht 1.88 m (6' 2.02\")   Wt 112 kg (247 lb)   SpO2 95%   BMI 31.70 kg/m     Estimated body mass index is 31.7 kg/m  as calculated from the following:    Height as of this encounter: 1.88 m (6' 2.02\").    Weight as of this encounter: 112 kg (247 lb).    Physical Exam  GENERAL: healthy, alert and no distress  EYES: Eyes grossly normal to inspection, PERRL and conjunctivae and sclerae normal.  No nystagmus.  All 4 visual fields are intact  HENT: Ear canals and TM's normal.  Nares are non-congested. Oropharynx is pink and moist. No tender with palpation to the sinuses.  NECK: no adenopathy, supple, no lymphadenopathy or thyromegaly.  No tender with palpation to the cervical spine or its paraspinous muscle.  No JV distention or carotid bruits  RESP: lungs clear to auscultation - no rales, rhonchi or wheezes  CV: regular rate and rhythm, no murmur.  ABDOMEN: soft, nondistended, nontender, no palpable masses or organomegaly with normal bowel sounds.  MS: no gross musculoskeletal defects noted, no edema.  Walk with no limping, normal gait.  All 4 extremities are equally in strength. Ankle, knees, hips, shoulders, elbows and wrists exams normal.  Normal fine motor skills on fingers.  Back is straight, no lordosis or scoliosis.  No tender with palpation to the spine or its paraspinous muscles.  SKIN: no suspicious lesions or rashes.  Above the waist skin exam was normal.  Declined below the waist skin exam.  NEURO: Normal strength and tone, mentation intact and speech normal.  Cranial nerves II through XII intact, DTR +2 throughout, no focal neurological deficit.  PSYCH: mentation appears normal, affect normal/bright.  Thoughts intact, no depression.  LYMPH: no cervical or supraclavicular adenopathy.   (male): Offered and recommended but he declined.  He knows concern about it.      Results for orders placed or performed in visit on 04/09/24   Lipid panel reflex to direct LDL Non-fasting     " Status: Abnormal   Result Value Ref Range    Cholesterol 229 (H) <200 mg/dL    Triglycerides 259 (H) <150 mg/dL    Direct Measure HDL 66 >=40 mg/dL    LDL Cholesterol Calculated 111 (H) <=100 mg/dL    Non HDL Cholesterol 163 (H) <130 mg/dL    Patient Fasting > 8hrs? Yes     Narrative    Cholesterol  Desirable:  <200 mg/dL    Triglycerides  Normal:  Less than 150 mg/dL  Borderline High:  150-199 mg/dL  High:  200-499 mg/dL  Very High:  Greater than or equal to 500 mg/dL    Direct Measure HDL  Female:  Greater than or equal to 50 mg/dL   Male:  Greater than or equal to 40 mg/dL    LDL Cholesterol  Desirable:  <100mg/dL  Above Desirable:  100-129 mg/dL   Borderline High:  130-159 mg/dL   High:  160-189 mg/dL   Very High:  >= 190 mg/dL    Non HDL Cholesterol  Desirable:  130 mg/dL  Above Desirable:  130-159 mg/dL  Borderline High:  160-189 mg/dL  High:  190-219 mg/dL  Very High:  Greater than or equal to 220 mg/dL   Hepatitis B Surface Antibody     Status: None   Result Value Ref Range    Hepatitis B Surface Antibody Nonreactive     Hepatitis B Surface Antibody Instrument Value <3.50 <8.5 m[IU]/mL   Comprehensive metabolic panel (BMP + Alb, Alk Phos, ALT, AST, Total. Bili, TP)     Status: Normal   Result Value Ref Range    Sodium 142 135 - 145 mmol/L    Potassium 3.8 3.4 - 5.3 mmol/L    Carbon Dioxide (CO2) 28 22 - 29 mmol/L    Anion Gap 10 7 - 15 mmol/L    Urea Nitrogen 10.5 6.0 - 20.0 mg/dL    Creatinine 0.93 0.67 - 1.17 mg/dL    GFR Estimate >90 >60 mL/min/1.73m2    Calcium 9.7 8.6 - 10.0 mg/dL    Chloride 104 98 - 107 mmol/L    Glucose 95 70 - 99 mg/dL    Alkaline Phosphatase 61 40 - 150 U/L    AST 38 0 - 45 U/L    ALT 45 0 - 70 U/L    Protein Total 8.1 6.4 - 8.3 g/dL    Albumin 4.8 3.5 - 5.2 g/dL    Bilirubin Total 0.4 <=1.2 mg/dL   Uric acid     Status: Normal   Result Value Ref Range    Uric Acid 5.9 3.4 - 7.0 mg/dL   PSA, screen     Status: Normal   Result Value Ref Range    Prostate Specific Antigen Screen 0.52  0.00 - 3.50 ng/mL    Narrative    This result is obtained using the Roche Elecsys total PSA method on the purvi [id number] immunoassay analyzer. Results obtained with different assay methods or kits cannot be used interchangeably.            Signed Electronically by: Narciso Mancia Mai, MD

## 2024-04-15 ENCOUNTER — ALLIED HEALTH/NURSE VISIT (OUTPATIENT)
Dept: FAMILY MEDICINE | Facility: CLINIC | Age: 54
End: 2024-04-15
Payer: COMMERCIAL

## 2024-04-15 DIAGNOSIS — Z23 ENCOUNTER FOR IMMUNIZATION: Primary | ICD-10-CM

## 2024-04-15 PROCEDURE — 90746 HEPB VACCINE 3 DOSE ADULT IM: CPT

## 2024-04-15 PROCEDURE — 99207 PR NO CHARGE NURSE ONLY: CPT

## 2024-04-15 PROCEDURE — 90471 IMMUNIZATION ADMIN: CPT

## 2024-04-15 NOTE — PROGRESS NOTES
Prior to immunization administration, verified patients identity using patient s name and date of birth. Please see Immunization Activity for additional information.     Screening Questionnaire for Adult Immunization    Are you sick today?   No   Do you have allergies to medications, food, a vaccine component or latex?   No   Have you ever had a serious reaction after receiving a vaccination?   No   Do you have a long-term health problem with heart, lung, kidney, or metabolic disease (e.g., diabetes), asthma, a blood disorder, no spleen, complement component deficiency, a cochlear implant, or a spinal fluid leak?  Are you on long-term aspirin therapy?   No   Do you have cancer, leukemia, HIV/AIDS, or any other immune system problem?   No   Do you have a parent, brother, or sister with an immune system problem?   No   In the past 3 months, have you taken medications that affect  your immune system, such as prednisone, other steroids, or anticancer drugs; drugs for the treatment of rheumatoid arthritis, Crohn s disease, or psoriasis; or have you had radiation treatments?   No   Have you had a seizure, or a brain or other nervous system problem?   No   During the past year, have you received a transfusion of blood or blood    products, or been given immune (gamma) globulin or antiviral drug?   No   For women: Are you pregnant or is there a chance you could become       pregnant during the next month?   No   Have you received any vaccinations in the past 4 weeks?   No     Immunization questionnaire answers were all negative.    I have reviewed the following standing orders:   This patient is due and qualifies for the Hepatitis B vaccine.    Click here for Hepatitis B Standing Order    I have reviewed the vaccines inclusion and exclusion criteria; No concerns regarding eligibility.     Patient instructed to remain in clinic for 15 minutes afterwards, and to report any adverse reactions.     Screening performed by  Sunitha Josue on 4/15/2024 at 1:53 PM.

## 2024-04-21 PROBLEM — E66.9 OBESITY: Status: ACTIVE | Noted: 2024-04-21

## 2024-04-29 DIAGNOSIS — M10.00 ACUTE IDIOPATHIC GOUT, UNSPECIFIED SITE: ICD-10-CM

## 2024-05-02 ENCOUNTER — MYC MEDICAL ADVICE (OUTPATIENT)
Dept: FAMILY MEDICINE | Facility: CLINIC | Age: 54
End: 2024-05-02
Payer: COMMERCIAL

## 2024-05-02 RX ORDER — ALLOPURINOL 100 MG/1
100 TABLET ORAL DAILY
Qty: 90 TABLET | Refills: 3 | Status: SHIPPED | OUTPATIENT
Start: 2024-05-02

## 2024-05-02 NOTE — TELEPHONE ENCOUNTER
Allopurinol 100 mg     Sent to [facility] today    Sent MyChart message    Brynn Richmond RN on 5/2/2024 at 10:27 AM

## 2024-05-07 DIAGNOSIS — E78.5 HYPERLIPIDEMIA LDL GOAL <130: ICD-10-CM

## 2024-05-08 RX ORDER — SIMVASTATIN 20 MG
TABLET ORAL
Qty: 90 TABLET | Refills: 2 | Status: SHIPPED | OUTPATIENT
Start: 2024-05-08

## 2024-05-14 ENCOUNTER — ALLIED HEALTH/NURSE VISIT (OUTPATIENT)
Dept: FAMILY MEDICINE | Facility: CLINIC | Age: 54
End: 2024-05-14
Payer: COMMERCIAL

## 2024-05-14 DIAGNOSIS — Z23 ENCOUNTER FOR IMMUNIZATION: Primary | ICD-10-CM

## 2024-05-14 PROCEDURE — 90746 HEPB VACCINE 3 DOSE ADULT IM: CPT

## 2024-05-14 PROCEDURE — 90471 IMMUNIZATION ADMIN: CPT

## 2024-05-14 PROCEDURE — 99207 PR NO CHARGE NURSE ONLY: CPT

## 2024-05-14 NOTE — PROGRESS NOTES
Prior to immunization administration, verified patients identity using patient s name and date of birth. Please see Immunization Activity for additional information.     Screening Questionnaire for Adult Immunization    Are you sick today?   No   Do you have allergies to medications, food, a vaccine component or latex?   No   Have you ever had a serious reaction after receiving a vaccination?   No   Do you have a long-term health problem with heart, lung, kidney, or metabolic disease (e.g., diabetes), asthma, a blood disorder, no spleen, complement component deficiency, a cochlear implant, or a spinal fluid leak?  Are you on long-term aspirin therapy?   No   Do you have cancer, leukemia, HIV/AIDS, or any other immune system problem?   No   Do you have a parent, brother, or sister with an immune system problem?   No   In the past 3 months, have you taken medications that affect  your immune system, such as prednisone, other steroids, or anticancer drugs; drugs for the treatment of rheumatoid arthritis, Crohn s disease, or psoriasis; or have you had radiation treatments?   No   Have you had a seizure, or a brain or other nervous system problem?   No   During the past year, have you received a transfusion of blood or blood    products, or been given immune (gamma) globulin or antiviral drug?   No   For women: Are you pregnant or is there a chance you could become       pregnant during the next month?   No   Have you received any vaccinations in the past 4 weeks?   No     Immunization questionnaire answers were all negative.    I have reviewed the following standing orders:   This patient is due and qualifies for the Hepatitis B vaccine.    Click here for Hepatitis B Standing Order    I have reviewed the vaccines inclusion and exclusion criteria; No concerns regarding eligibility.     Patient instructed to remain in clinic for 15 minutes afterwards, and to report any adverse reactions.     Screening performed by  Sunitha Josue on 5/14/2024 at 10:20 AM.

## 2025-02-28 ENCOUNTER — MEDICAL CORRESPONDENCE (OUTPATIENT)
Dept: HEALTH INFORMATION MANAGEMENT | Facility: CLINIC | Age: 55
End: 2025-02-28

## 2025-03-10 ENCOUNTER — PATIENT OUTREACH (OUTPATIENT)
Dept: CARE COORDINATION | Facility: CLINIC | Age: 55
End: 2025-03-10
Payer: COMMERCIAL

## 2025-03-24 ENCOUNTER — PATIENT OUTREACH (OUTPATIENT)
Dept: CARE COORDINATION | Facility: CLINIC | Age: 55
End: 2025-03-24
Payer: COMMERCIAL

## 2025-04-04 ENCOUNTER — MYC MEDICAL ADVICE (OUTPATIENT)
Dept: FAMILY MEDICINE | Facility: CLINIC | Age: 55
End: 2025-04-04
Payer: COMMERCIAL

## 2025-04-10 SDOH — HEALTH STABILITY: PHYSICAL HEALTH: ON AVERAGE, HOW MANY MINUTES DO YOU ENGAGE IN EXERCISE AT THIS LEVEL?: 40 MIN

## 2025-04-10 SDOH — HEALTH STABILITY: PHYSICAL HEALTH: ON AVERAGE, HOW MANY DAYS PER WEEK DO YOU ENGAGE IN MODERATE TO STRENUOUS EXERCISE (LIKE A BRISK WALK)?: 3 DAYS

## 2025-04-10 ASSESSMENT — SOCIAL DETERMINANTS OF HEALTH (SDOH): HOW OFTEN DO YOU GET TOGETHER WITH FRIENDS OR RELATIVES?: THREE TIMES A WEEK

## 2025-04-14 ENCOUNTER — MYC MEDICAL ADVICE (OUTPATIENT)
Dept: FAMILY MEDICINE | Facility: CLINIC | Age: 55
End: 2025-04-14

## 2025-04-14 ENCOUNTER — OFFICE VISIT (OUTPATIENT)
Dept: FAMILY MEDICINE | Facility: CLINIC | Age: 55
End: 2025-04-14
Payer: COMMERCIAL

## 2025-04-14 VITALS
RESPIRATION RATE: 16 BRPM | HEIGHT: 75 IN | TEMPERATURE: 97 F | HEART RATE: 82 BPM | WEIGHT: 250.6 LBS | SYSTOLIC BLOOD PRESSURE: 110 MMHG | DIASTOLIC BLOOD PRESSURE: 80 MMHG | BODY MASS INDEX: 31.16 KG/M2 | OXYGEN SATURATION: 95 %

## 2025-04-14 DIAGNOSIS — L82.1 SEBORRHEIC KERATOSES: ICD-10-CM

## 2025-04-14 DIAGNOSIS — E66.811 CLASS 1 OBESITY DUE TO EXCESS CALORIES WITH SERIOUS COMORBIDITY AND BODY MASS INDEX (BMI) OF 31.0 TO 31.9 IN ADULT: ICD-10-CM

## 2025-04-14 DIAGNOSIS — M1A.09X0 IDIOPATHIC CHRONIC GOUT OF MULTIPLE SITES WITHOUT TOPHUS: ICD-10-CM

## 2025-04-14 DIAGNOSIS — Z82.49 FAMILY HISTORY OF ISCHEMIC HEART DISEASE: ICD-10-CM

## 2025-04-14 DIAGNOSIS — Z00.00 ROUTINE GENERAL MEDICAL EXAMINATION AT A HEALTH CARE FACILITY: Primary | ICD-10-CM

## 2025-04-14 DIAGNOSIS — H69.91 DYSFUNCTION OF RIGHT EUSTACHIAN TUBE: ICD-10-CM

## 2025-04-14 DIAGNOSIS — E78.5 HYPERLIPIDEMIA LDL GOAL <130: ICD-10-CM

## 2025-04-14 DIAGNOSIS — E66.09 CLASS 1 OBESITY DUE TO EXCESS CALORIES WITH SERIOUS COMORBIDITY AND BODY MASS INDEX (BMI) OF 31.0 TO 31.9 IN ADULT: ICD-10-CM

## 2025-04-14 LAB
ALBUMIN SERPL BCG-MCNC: 4.9 G/DL (ref 3.5–5.2)
ALP SERPL-CCNC: 66 U/L (ref 40–150)
ALT SERPL W P-5'-P-CCNC: 35 U/L (ref 0–70)
ANION GAP SERPL CALCULATED.3IONS-SCNC: 9 MMOL/L (ref 7–15)
AST SERPL W P-5'-P-CCNC: 29 U/L (ref 0–45)
BILIRUB SERPL-MCNC: 0.5 MG/DL
BUN SERPL-MCNC: 16.7 MG/DL (ref 6–20)
CALCIUM SERPL-MCNC: 10.1 MG/DL (ref 8.8–10.4)
CHLORIDE SERPL-SCNC: 99 MMOL/L (ref 98–107)
CHOLEST SERPL-MCNC: 247 MG/DL
CREAT SERPL-MCNC: 0.91 MG/DL (ref 0.67–1.17)
EGFRCR SERPLBLD CKD-EPI 2021: >90 ML/MIN/1.73M2
FASTING STATUS PATIENT QL REPORTED: YES
FASTING STATUS PATIENT QL REPORTED: YES
GLUCOSE SERPL-MCNC: 98 MG/DL (ref 70–99)
HCO3 SERPL-SCNC: 29 MMOL/L (ref 22–29)
HDLC SERPL-MCNC: 64 MG/DL
LDLC SERPL CALC-MCNC: ABNORMAL MG/DL
LDLC SERPL DIRECT ASSAY-MCNC: 114 MG/DL
NONHDLC SERPL-MCNC: 183 MG/DL
POTASSIUM SERPL-SCNC: 4.1 MMOL/L (ref 3.4–5.3)
PROT SERPL-MCNC: 8 G/DL (ref 6.4–8.3)
PSA SERPL DL<=0.01 NG/ML-MCNC: 0.7 NG/ML (ref 0–3.5)
SODIUM SERPL-SCNC: 137 MMOL/L (ref 135–145)
TRIGL SERPL-MCNC: 515 MG/DL
URATE SERPL-MCNC: 6.2 MG/DL (ref 3.4–7)

## 2025-04-14 PROCEDURE — G0103 PSA SCREENING: HCPCS | Performed by: FAMILY MEDICINE

## 2025-04-14 PROCEDURE — 36415 COLL VENOUS BLD VENIPUNCTURE: CPT | Performed by: FAMILY MEDICINE

## 2025-04-14 PROCEDURE — 1126F AMNT PAIN NOTED NONE PRSNT: CPT | Performed by: FAMILY MEDICINE

## 2025-04-14 PROCEDURE — 80061 LIPID PANEL: CPT | Performed by: FAMILY MEDICINE

## 2025-04-14 PROCEDURE — 3079F DIAST BP 80-89 MM HG: CPT | Performed by: FAMILY MEDICINE

## 2025-04-14 PROCEDURE — 3074F SYST BP LT 130 MM HG: CPT | Performed by: FAMILY MEDICINE

## 2025-04-14 PROCEDURE — 80053 COMPREHEN METABOLIC PANEL: CPT | Performed by: FAMILY MEDICINE

## 2025-04-14 PROCEDURE — 17110 DESTRUCTION B9 LES UP TO 14: CPT | Performed by: FAMILY MEDICINE

## 2025-04-14 PROCEDURE — 83721 ASSAY OF BLOOD LIPOPROTEIN: CPT | Mod: 59 | Performed by: FAMILY MEDICINE

## 2025-04-14 PROCEDURE — 99214 OFFICE O/P EST MOD 30 MIN: CPT | Mod: 25 | Performed by: FAMILY MEDICINE

## 2025-04-14 PROCEDURE — 99396 PREV VISIT EST AGE 40-64: CPT | Performed by: FAMILY MEDICINE

## 2025-04-14 PROCEDURE — 84550 ASSAY OF BLOOD/URIC ACID: CPT | Performed by: FAMILY MEDICINE

## 2025-04-14 RX ORDER — SIMVASTATIN 20 MG
20 TABLET ORAL
Qty: 90 TABLET | Refills: 3 | Status: CANCELLED | OUTPATIENT
Start: 2025-04-14

## 2025-04-14 RX ORDER — SIMVASTATIN 40 MG
40 TABLET ORAL AT BEDTIME
Qty: 90 TABLET | Refills: 1 | Status: SHIPPED | OUTPATIENT
Start: 2025-04-14

## 2025-04-14 ASSESSMENT — PAIN SCALES - GENERAL: PAINLEVEL_OUTOF10: NO PAIN (0)

## 2025-04-14 NOTE — PATIENT INSTRUCTIONS
Patient Education   Preventive Care Advice   This is general advice given by our system to help you stay healthy. However, your care team may have specific advice just for you. Please talk to your care team about your preventive care needs.  Nutrition  Eat 5 or more servings of fruits and vegetables each day.  Try wheat bread, brown rice and whole grain pasta (instead of white bread, rice, and pasta).  Get enough calcium and vitamin D. Check the label on foods and aim for 100% of the RDA (recommended daily allowance).  Lifestyle  Exercise at least 150 minutes each week  (30 minutes a day, 5 days a week).  Do muscle strengthening activities 2 days a week. These help control your weight and prevent disease.  No smoking.  Wear sunscreen to prevent skin cancer.  Have a dental exam and cleaning every 6 months.  Yearly exams  See your health care team every year to talk about:  Any changes in your health.  Any medicines your care team has prescribed.  Preventive care, family planning, and ways to prevent chronic diseases.  Shots (vaccines)   HPV shots (up to age 26), if you've never had them before.  Hepatitis B shots (up to age 59), if you've never had them before.  COVID-19 shot: Get this shot when it's due.  Flu shot: Get a flu shot every year.  Tetanus shot: Get a tetanus shot every 10 years.  Pneumococcal, hepatitis A, and RSV shots: Ask your care team if you need these based on your risk.  Shingles shot (for age 50 and up)  General health tests  Diabetes screening:  Starting at age 35, Get screened for diabetes at least every 3 years.  If you are younger than age 35, ask your care team if you should be screened for diabetes.  Cholesterol test: At age 39, start having a cholesterol test every 5 years, or more often if advised.  Bone density scan (DEXA): At age 50, ask your care team if you should have this scan for osteoporosis (brittle bones).  Hepatitis C: Get tested at least once in your life.  STIs (sexually  transmitted infections)  Before age 24: Ask your care team if you should be screened for STIs.  After age 24: Get screened for STIs if you're at risk. You are at risk for STIs (including HIV) if:  You are sexually active with more than one person.  You don't use condoms every time.  You or a partner was diagnosed with a sexually transmitted infection.  If you are at risk for HIV, ask about PrEP medicine to prevent HIV.  Get tested for HIV at least once in your life, whether you are at risk for HIV or not.  Cancer screening tests  Cervical cancer screening: If you have a cervix, begin getting regular cervical cancer screening tests starting at age 21.  Breast cancer scan (mammogram): If you've ever had breasts, begin having regular mammograms starting at age 40. This is a scan to check for breast cancer.  Colon cancer screening: It is important to start screening for colon cancer at age 45.  Have a colonoscopy test every 10 years (or more often if you're at risk) Or, ask your provider about stool tests like a FIT test every year or Cologuard test every 3 years.  To learn more about your testing options, visit:   .  For help making a decision, visit:   https://bit.ly/me20901.  Prostate cancer screening test: If you have a prostate, ask your care team if a prostate cancer screening test (PSA) at age 55 is right for you.  Lung cancer screening: If you are a current or former smoker ages 50 to 80, ask your care team if ongoing lung cancer screenings are right for you.  For informational purposes only. Not to replace the advice of your health care provider. Copyright   2023 [employer]. All rights reserved. Clinically reviewed by the [facility]itions Program. [employer]target [id number] - REV 01/24.

## 2025-04-14 NOTE — PROGRESS NOTES
Preventive Care Visit  [facility]  Narciso Mancia Mai, MD, Family Medicine  Apr 14, 2025      Assessment & Plan     (Z00.00) Routine general medical examination at a health care facility  (primary encounter diagnosis)  Comment: Overall Carlos is healthy and is doing well.  Discussed about safety issue, healthy diet, exercising, weight management, sleeping hygiene, advanced directive care, falling prevention, and depression prevention.  Emphasized importance of updating his to advance directive care; its information was given today and he was encouraged to have it updated as soon as possible.  Also recommended to take it easy with positional change to prevent falling.  Hallways in his house should be clear of objects and well lighted at all times.  Recommended daily exercise for at least 30 minutes, more as tolerated.  Healthy diet with adequate fluid intake and resting discussed and recommended.  No safety or mental health concerns.  Follow in 1 year for physical, earlier as needed.  Labs as ordered and results reviewed.  All of his questions were answered.      Immunization:  UTD except Pneumovax, COVID and influenza vaccination which was recommended and offered but he declined.  Risks and benefits discussed and he is willing to take the risk.  He is willing to take the hepatitis B vaccination today but left the office before it was given.  He was recommended to return for nursing visit at his convenience for the hep B vaccination.     Colon cancer screening:  UTD.  Last colonoscopy was in 2019 and it was normal, he was cleared for 10 years.     Prostate cancer screening:  Discussed with him about the pros and cons of prostate screening cancer with PSA.  Also educated about the potential false positive which may require unnecessary work-up.  He was informed of negative/normal PSA leve does not rule out prostate cancer completely although it is very unlikely.  He understands and is willing to  take the risk.      STD: Denied of STD risk and declined it screening today     Skin cancer prevention: Above the waist skin exam today was normal.  Declined below the waist skin exam.  He was encouraged to monitor and follow-up if noted skin lesions with changes in size, shape, color or texture.  He was also encouraged to be generous with sunblock and/or sun protective gear when he is in the sun for a longer period of time.    Plan: Comprehensive metabolic panel (BMP + Alb, Alk         Phos, ALT, AST, Total. Bili, TP), Lipid panel         reflex to direct LDL Fasting, PSA, screen            (E78.5) Hyperlipidemia LDL goal <130  Comment: Otherwise healthy. No history of HTN, DM, CAD, CVA or PAD. The goal for his LDL is to be less than 160. His TG to day was high, although it was non-fasting, but he ate only 1/2 an egg this morning.  Tolerating Lipitor well. No history of liver disease and his liver enzymes today was normal. Denies of excessive alcohol intake. Will increase the simvastatin from 20 mg to 40 mg daily. Exercising, healthy diet and weight management discussed and encouraged as above. Recheck the CMP and the cholesterol level in a year.       (M1A.09X0) Idiopathic chronic gout of multiple sites without tophus  Comment:  Doing well with the allopurinol.  His uric acid level has been normal and it was 6.2 today.  Has not had a flareup since starting the allopurinol for couple years.  Discussed about stopping it to see how he does but he declined; he preferred to stay on it.  Food to avoid also discussed, especially with alcohol.  Symptoms that need be seen or call in discussed.  His kidney function today was normal.     Plan: Uric acid            (E66.811,  E66.09,  Z68.31) Class 1 obesity due to excess calories with serious comorbidity and body mass index (BMI) of 31.0 to 31.9 in adult  Comment: Today's BMI was 31, about the same as it was a year ago.  He also has high cholesterol.  Discussed about its  potential long and short-term complications.  Also educated him about the goal for his weight and BMI.  Exercising and healthy/portioned diet discussed and encouraged.  No history of thyroid problem.  Will continue to monitor closely.    (Z82.49) FH CAD - mother  of MI at 53  Comment:  His mother  of MI at the age of 53.  He is overall healthy; his cholesterol level is not control - please see above for further details.  He is mildly obese.  I informed him that he is considered at higher risk for premature heart disease and therefore it is extremely importance to screen and control all of his modifiable risks.  I encouraged him to work on weight loss with exercising and healthy diet.  I encouraged him to keep up the good work with no tobacco use drug use.  He should limit to no more than 1-2 drink of alcohol a day.  Also recommended aggressively screening and treating for diabetes, high blood pressure and PAD at least annually.  Symptoms that need to be seen to call and discuss.  I would have a low threshold to screen for heart disease if he develops any cardiac symptoms.  His last stress echocardiogram in  was normal.  Last EKG was in  and it was normal.       (L82.1) Seborrheic keratoses  Comment: The dry scab fell out easily with gentle rubbing.  The area was slightly irritated, consistent with keratosis.  No active inflammation or sign of active infection.  He was reassured.  Been itching lately.  Recommended cryotherapy which he agreed.  The lesion was frozen with liquid nitrogen for 10 seconds followed by 30 seconds thawing, 6 cycles applied.  Tolerated the procedure well.  Postprocedural care discussed.  Follow-up for biopsy if persists or recurs.    Plan: DESTRUCT BENIGN LESION, UP TO 14            (H69.91) Dysfunction of right Eustachian tubes  We discussed about the nature of the conditio and it potential causes.  He was scuba diving 3 weeks ago and went flying last week as well.  Reassure  "him that there is no ear infection on exam today and I expect the symptoms to be resolved on their own slowly.  Recommend to try OTC decongestant such as Zyrtec or Pseudofed.  Also recommend try to release the pressure by chewing gum, yawning or blow pressure into the ears.  Tylenol or Ibuprofen as needed for pain.  Call in or follow up if symptoms persist or worse in the next several days.  All of her questions were answered.       The longitudinal plan of care for the diagnosis(es)/condition(s) as documented were addressed during this visit. Due to the added complexity in care, I will continue to support Carlos in the subsequent management and with ongoing continuity of care.       Patient has been advised of split billing requirements and indicates understanding: Yes        BMI  Estimated body mass index is 31.12 kg/m  as calculated from the following:    Height as of this encounter: 1.911 m (6' 3.25\").    Weight as of this encounter: 113.7 kg (250 lb 9.6 oz).   Weight management plan: Discussed healthy diet and exercise guidelines    Counseling  Appropriate preventive services were addressed with this patient via screening, questionnaire, or discussion as appropriate for fall prevention, nutrition, physical activity, Tobacco-use cessation, social engagement, weight loss and cognition.  Checklist reviewing preventive services available has been given to the patient.  Reviewed patient's diet, addressing concerns and/or questions.   He is at risk for lack of exercise and has been provided with information to increase physical activity for the benefit of his well-being.       Work on weight loss  Regular exercise    Subjective   Carlos is a 54 year old, presenting for the following:  Physical, Lesion Removal, and Ear Problem (Right ear- hearing seems less- possibly plugged, started just over a week ago. )        4/14/2025     8:23 AM   Additional Questions   Roomed by Marilou CABRERA    Carlos is here today for " his general physical and general follow-up.  Generally is doing well, no specific concerns today.  He takes allopurinol for gout and simvastatin for high cholesterol.  Tolerating the medications well, no side effect.  Been on these medications for a while.  Since starting the allopurinol, has had not any gouty attack or flareup - last episode was over couple years ago.  No headache or dizziness.  No chest pain or shortness of breath.  No muscle or joint pain from the simvastatin.  No history of liver disease or elevated liver enzymes.  No excessive alcohol intake.  Not exercising but he typically tries to keep himself busy throughout the day.    Stated that his right ear pain feel blocked up for the last 7 to 10 days, it is about the same.  Also noted decrease in hearing.  No pain or drainage.  He was scuba diving 3 weeks ago and will try and about a week ago.  Stated when he did you on your arm and the jaw, he would feel some crackles in his ear.  No sinus pain or pressure.  No headache or dizziness.  No red no runny nose or nasal congestion.  No fever or chills.    Also has a lesion on his back.first noted for about 6 months.  Not sure if it has changed in size or shape.  His wife is concerned.  It itches at times.  No personal or family history of skin cancer.     Otherwise doing well.  No major medical care or procedure done since the last physical over a year ago. No headache, dizziness or acute visual change. No runny nose, nasal congestion, coughing, fever or chill.  No CP/SOB. No N/V/D/C or problem with urination.  Denied of nocturia.  No problem with erection.  Denied of STD risk and declined screening no.  No leg swelling, dyspnea or orthopnea.  No joint or muscle pain.  Drinks couple beers a day, no drugs.  Never smoked, used to chew tobacco but quit many years ago.  No problem with sleeping.  No safety or mental health concerns.  She lives with his wife and children.  No weight change. No other concern  today.      Advance Care Planning  Patient does not have a Health Care Directive: Patient states has Advance Directive and will bring in a copy to clinic.        4/10/2025   General Health   How would you rate your overall physical health? Good   Feel stress (tense, anxious, or unable to sleep) Not at all         4/10/2025   Nutrition   Three or more servings of calcium each day? Yes   Diet: Regular (no restrictions)   How many servings of fruit and vegetables per day? (!) 2-3   How many sweetened beverages each day? 0-1         4/10/2025   Exercise   Days per week of moderate/strenous exercise 3 days   Average minutes spent exercising at this level 40 min         4/10/2025   Social Factors   Frequency of gathering with friends or relatives Three times a week   Worry food won't last until get money to buy more No   Food not last or not have enough money for food? No   Do you have housing? (Housing is defined as stable permanent housing and does not include staying ouside in a car, in a tent, in an abandoned building, in an overnight shelter, or couch-surfing.) No   Are you worried about losing your housing? No   Lack of transportation? No   Unable to get utilities (heat,electricity)? No   Want help with housing or utility concern? No   (!) HOUSING CONCERN PRESENT      4/10/2025   Fall Risk   Fallen 2 or more times in the past year? No   Trouble with walking or balance? No          4/10/2025   Dental   Dentist two times every year? Yes           4/4/2024   TB Screening   Were you born outside of the [place]? No             Today's PHQ-2 Score:       4/14/2025     8:35 AM   PHQ-2 ( 1999 [employer])   Q1: Little interest or pleasure in doing things 1   Q2: Feeling down, depressed or hopeless 0   PHQ-2 Score 1         4/10/2025   Substance Use   Alcohol more than 3/day or more than 7/wk No   Do you use any other substances recreationally? No     Social History     Tobacco Use    Smoking status: Never    Smokeless tobacco: Former      Types: Chew     Quit date: 2015   Vaping Use    Vaping status: Never Used   Substance Use Topics    Alcohol use: Yes     Comment: daily 2 drinks    Drug use: No           4/10/2025   STI Screening   New sexual partner(s) since last STI/HIV test? No   Last PSA:   PSA   Date Value Ref Range Status   2021 0.91 0 - 4 ug/L Final     Comment:     Assay Method:  Chemiluminescence using Siemens Vista analyzer     Prostate Specific Antigen Screen   Date Value Ref Range Status   2024 0.52 0.00 - 3.50 ng/mL Final     ASCVD Risk   The 10-year ASCVD risk score (Terri PAREDES, et al., 2019) is: 4.3%    Values used to calculate the score:      Age: 54 years      Sex: Male      Is Non- : No      Diabetic: No      Tobacco smoker: No      Systolic Blood Pressure: 110 mmHg      Is BP treated: No      HDL Cholesterol: 66 mg/dL      Total Cholesterol: 229 mg/dL    Fracture Risk Assessment Tool  Link to Frax Calculator  Use the information below to complete the Frax calculator  : 1970  Sex: male  Weight (kg): 113.7 kg (actual weight)  Height (cm): 191.1 cm  Previous Fragility Fracture:  Yes  History of parent with fractured hip:  No  Current Smoking:  No  Patient has been on glucocorticoids for more than 3 months (5mg/day or more): No  Rheumatoid Arthritis on Problem List:  No  Secondary Osteoporosis on Problem List:  No  Consumes 3 or more units of alcohol per day: No  Femoral Neck BMD (g/cm2)           Reviewed and updated as needed this visit by Provider   Tobacco  Allergies  Meds  Problems  Med Hx   Fam Hx  Soc Hx Sexual   Activity          Past Medical History:   Diagnosis Date    Complex tear of medial meniscus of right knee as current injury, initial encounter 2018     Past Surgical History:   Procedure Laterality Date    ARTHROSCOPY KNEE Left 2023    Procedure: ARTHROSCOPY, LEFT KNEE meniscal and chondral debridement;  Surgeon: Carlos Barrientos MD;   Location: [facility]    ARTHROSCOPY KNEE WITH DEBRIDEMENT JOINT, COMBINED Right 2018    Procedure: ARTHROSCOPY KNEE WITH DEBRIDEMENT JOINT;  Right knee arthroscopy with medial meniscus debridement;  Surgeon: Seth Basilio MD;  Location: [facility]    COLONOSCOPY N/A 2019    Procedure: COLONOSCOPY;  Surgeon: Tyler Miles DO;  Location: [facility]    FRACTURE TX, ANKLE RT/LT      Fracture TX Ankle RT/LT    HERNIA REPAIR, INGUINAL RT/LT  10/13/2006    Laparoscopic left inguinal hernia repair.    OPEN REDUCTION INTERNAL FIXATION FOREARM Right 2014    Procedure: OPEN REDUCTION INTERNAL FIXATION FOREARM;  Surgeon: Seth Basilio MD;  Location: [facility]    REPAIR HAMMER TOE  10/14/2011    Procedure:REPAIR HAMMER TOE; Correction Toes 4th,5th Toe Right Foot; Surgeon:ASIF CRUM; Location:[facility]    VASECTOMY Bilateral 2005     Patient Active Problem List   Diagnosis    HYPERLIPIDEMIA LDL GOAL <130    FH CAD - mother  of MI at 53    S/P arthroscopy of right knee    Idiopathic chronic gout of multiple sites without tophus    Obesity     Past Surgical History:   Procedure Laterality Date    ARTHROSCOPY KNEE Left 2023    Procedure: ARTHROSCOPY, LEFT KNEE meniscal and chondral debridement;  Surgeon: Carlos Barrientos MD;  Location: [facility]    ARTHROSCOPY KNEE WITH DEBRIDEMENT JOINT, COMBINED Right 2018    Procedure: ARTHROSCOPY KNEE WITH DEBRIDEMENT JOINT;  Right knee arthroscopy with medial meniscus debridement;  Surgeon: Seth Basilio MD;  [facility]    COLONOSCOPY N/A 2019    Procedure: COLONOSCOPY;  Surgeon: Tyler Miles DO;  [facility]    FRACTURE TX, ANKLE RT/LT      Fracture TX Ankle RT/LT    HERNIA REPAIR, INGUINAL RT/LT  10/13/2006    Laparoscopic left inguinal hernia repair.    OPEN REDUCTION INTERNAL FIXATION FOREARM Right 2014    Procedure: OPEN REDUCTION INTERNAL FIXATION FOREARM;  Surgeon: Seth Basilio MD;   "Location: [facility]    REPAIR HAMMER TOE  10/14/2011    Procedure:REPAIR HAMMER TOE; Correction Toes 4th,5th Toe Right Foot; Surgeon:ASIF CRUM; Location:[facility]    VASECTOMY Bilateral 2005       Social History     Tobacco Use    Smoking status: Never    Smokeless tobacco: Former     Types: Chew     Quit date: 2015   Substance Use Topics    Alcohol use: Yes     Comment: daily 2 drinks     Family History   Problem Relation Age of Onset    Coronary Artery Disease Mother          of MI at 55    Hyperlipidemia Mother     Hyperlipidemia Father     No Known Problems Sister     No Known Problems Sister     No Known Problems Brother     C.A.D. Maternal Grandfather     No Known Problems Son     No Known Problems Daughter     No Known Problems Daughter     Diabetes No family hx of     Cancer - colorectal No family hx of          Current Outpatient Medications   Medication Sig Dispense Refill    allopurinol (ZYLOPRIM) 100 MG tablet TAKE ONE TABLET BY MOUTH ONCE DAILY 90 tablet 3    simvastatin (ZOCOR) 20 MG tablet TAKE ONE TABLET BY MOUTH AT BEDTIME 90 tablet 0     Allergies   Allergen Reactions    No Known Drug Allergy          Review of Systems  Constitutional, HEENT, cardiovascular, pulmonary, GI, , musculoskeletal, neuro, skin, endocrine and psych systems are negative, except as otherwise noted.     Objective    Exam  /80   Pulse 82   Temp 97  F (36.1  C) (Temporal)   Resp 16   Ht 1.911 m (6' 3.25\")   Wt 113.7 kg (250 lb 9.6 oz)   SpO2 95%   BMI 31.12 kg/m     Estimated body mass index is 31.12 kg/m  as calculated from the following:    Height as of this encounter: 1.911 m (6' 3.25\").    Weight as of this encounter: 113.7 kg (250 lb 9.6 oz).    Physical Exam  GENERAL: healthy, alert and no distress.  Speaking in full sentences.  EYES: Eyes grossly normal to inspection, PERRL and conjunctivae and sclerae normal.  No nystagmus.  All 4 visual fields are intact  HENT: Ear canals and TM's " normal.  Nares are non-congested. Oropharynx is pink and moist. No tender with palpation to the sinuses.  NECK: no adenopathy, supple, no lymphadenopathy or thyromegaly.  No tender with palpation to the cervical spine or its paraspinous muscle.  No JV distention or carotid bruits  RESP: lungs clear to auscultation - no rales, rhonchi or wheezes  CV: regular rate and rhythm, no murmur.  ABDOMEN: soft, nondistended, nontender, no palpable masses or organomegaly with normal bowel sounds.  MS: no gross musculoskeletal defects noted, no edema.  Walk with no limping, normal gait.  All 4 extremities are equally in strength. Ankle, knees, hips, shoulders, elbows and wrists exams normal.  Normal fine motor skills on fingers.  Back is straight, no lordosis or scoliosis.  No tender with palpation to the spine or its paraspinous muscles.  SKIN: Above the waist skin exam was normal, no suspicious lesions or rashes.  Multiple dark nodule on his back, consistent with seborrheic keratosis.  The concerning lesion -more of a dry scab that was easily scraped off with gentle rubbing.  No pigmentation.  Slightly irritated but no ulcer or open wound  - more consistent with seborrheic keratosis.  Offered and recommended below the waist skin exam but he declined.   NEURO: Normal strength and tone, mentation intact and speech normal.  Cranial nerves II through XII intact, DTR +2 throughout, no focal neurological deficit.  PSYCH: mentation appears normal, affect normal/bright.  Thoughts intact, no depression.  LYMPH: no cervical or supraclavicular adenopathy.   (male): Offered and recommended but he declined.  He knows concern about it.      Results for orders placed or performed in visit on 04/14/25   Comprehensive metabolic panel (BMP + Alb, Alk Phos, ALT, AST, Total. Bili, TP)     Status: None   Result Value Ref Range    Sodium 137 135 - 145 mmol/L    Potassium 4.1 3.4 - 5.3 mmol/L    Carbon Dioxide (CO2) 29 22 - 29 mmol/L    Anion Gap  9 7 - 15 mmol/L    Urea Nitrogen 16.7 6.0 - 20.0 mg/dL    Creatinine 0.91 0.67 - 1.17 mg/dL    GFR Estimate >90 >60 mL/min/1.73m2    Calcium 10.1 8.8 - 10.4 mg/dL    Chloride 99 98 - 107 mmol/L    Glucose 98 70 - 99 mg/dL    Alkaline Phosphatase 66 40 - 150 U/L    AST 29 0 - 45 U/L    ALT 35 0 - 70 U/L    Protein Total 8.0 6.4 - 8.3 g/dL    Albumin 4.9 3.5 - 5.2 g/dL    Bilirubin Total 0.5 <=1.2 mg/dL    Patient Fasting > 8hrs? Yes    Lipid panel reflex to direct LDL Fasting     Status: Abnormal   Result Value Ref Range    Cholesterol 247 (H) <200 mg/dL    Triglycerides 515 (H) <150 mg/dL    Direct Measure HDL 64 >=40 mg/dL    LDL Cholesterol Calculated      Non HDL Cholesterol 183 (H) <130 mg/dL    Patient Fasting > 8hrs? Yes     Narrative    Cholesterol  Desirable: < 200 mg/dL  Borderline High: 200 - 239 mg/dL  High: >= 240 mg/dL    Triglycerides  Normal: < 150 mg/dL  Borderline High: 150 - 199 mg/dL  High: 200-499 mg/dL  Very High: >= 500 mg/dL    Direct Measure HDL  Female: >= 50 mg/dL   Male: >= 40 mg/dL    LDL Cholesterol  Desirable: < 100 mg/dL  Above Desirable: 100 - 129 mg/dL   Borderline High: 130 - 159 mg/dL   High:  160 - 189 mg/dL   Very High: >= 190 mg/dL    Non HDL Cholesterol  Desirable: < 130 mg/dL  Above Desirable: 130 - 159 mg/dL  Borderline High: 160 - 189 mg/dL  High: 190 - 219 mg/dL  Very High: >= 220 mg/dL   Uric acid     Status: Normal   Result Value Ref Range    Uric Acid 6.2 3.4 - 7.0 mg/dL   PSA, screen     Status: Normal   Result Value Ref Range    Prostate Specific Antigen Screen 0.70 0.00 - 3.50 ng/mL    Narrative    This result is obtained using the Roche Elecsys total PSA method on the purvi e601 immunoassay analyzer, which is an ultrasensitive method. Results obtained with different assay methods or kits cannot be used interchangeably.  This test is intended for initial prostate cancer screening. PSA values exceeding the age-specific limits are suspicious for prostate disease, but  additional testing, such as prostate biopsy, is needed to diagnose prostate pathology. The American Cancer Society recommends annual examination with digital rectal examination and serum PSA beginning at age 50 and for men with a life expectancy of at least 10 years after detection of prostate cancer. For men in high-risk groups, such as  Americans or men with a first-degree relative diagnosed at a younger age, testing should begin at a younger age. It is generally recommended that information be provided to patients about the benefits and limitations of testing and treatment so they can make informed decisions.   LDL cholesterol direct     Status: Abnormal   Result Value Ref Range    LDL Cholesterol Direct 114 (H) <100 mg/dL        Signed Electronically by: Narciso Mancia Mai, MD

## 2025-04-22 ENCOUNTER — ALLIED HEALTH/NURSE VISIT (OUTPATIENT)
Dept: FAMILY MEDICINE | Facility: CLINIC | Age: 55
End: 2025-04-22
Payer: COMMERCIAL

## 2025-04-22 DIAGNOSIS — Z23 ENCOUNTER FOR IMMUNIZATION: Primary | ICD-10-CM

## 2025-04-22 PROCEDURE — 99207 PR NO CHARGE NURSE ONLY: CPT

## 2025-04-22 PROCEDURE — 90746 HEPB VACCINE 3 DOSE ADULT IM: CPT

## 2025-04-22 PROCEDURE — 90471 IMMUNIZATION ADMIN: CPT

## 2025-04-22 NOTE — PROGRESS NOTES
Prior to immunization administration, verified patients identity using patient s name and date of birth. Please see Immunization Activity for additional information.     Is the patient's temperature normal (100.5 or less)? Yes            4/22/2025   Hepatitis B   Have you had a serious reaction to a hepatitis B vaccine or to something in a hepatitis B vaccine, including yeast)? No   Are you getting kidney dialysis (either peritoneal or hemodialysis)? No   Do you have an allergy to latex? No         Patient MEETS CRITERIA. PROCEED with vaccine administration.        Patient instructed to remain in clinic for 15 minutes afterwards, and to report any adverse reactions.      Link to Ancillary Visit Immunization Standing Orders SmartSet     Screening performed by Sunitha Josue on 4/22/2025 at 1:39 PM.

## 2025-05-27 ENCOUNTER — NURSE TRIAGE (OUTPATIENT)
Dept: FAMILY MEDICINE | Facility: CLINIC | Age: 55
End: 2025-05-27
Payer: COMMERCIAL

## 2025-05-27 NOTE — TELEPHONE ENCOUNTER
Nurse Triage SBAR    Is this a 2nd Level Triage? NO    Situation: patient thinks he had a hernia in his right groin for 2 weeks.    Background: he had a hernia in the past.    Assessment: it hurts when he coughs. He has had some swelling in his groin.he sates he has a dull ache off and on. He states it has been painful to the touch.    Protocol Recommended Disposition:   Go to ED Now    Recommendation: per protocol patient advised to go to the ER. Please advise if patient can be seen in clinic. He will be out of town he will be back Monday.     Routed to provider  Alissa Esparza RN on 5/27/2025 at 1:31 PM    Reason for Disposition   Hernia is painful or tender to touch    Additional Information   Negative: Swelling of scrotum and has not previously been diagnosed with a hernia   Negative: SEVERE abdominal pain    Protocols used: Hernia-A-OH

## 2025-05-28 NOTE — TELEPHONE ENCOUNTER
Phoned patient who stated he is feeling ok.  He verbalized that he did not go to the ED when out of town, and with his work schedule he would not be able to schedule an office visit until Monday 6/2/2025.  He stated he believes these symptoms might be a hernia, as he has had a hernia in the past but this time the symptoms are not as severe as they were when he had a hernia in the past.  Patient stated he would like to schedule an office visit for 6/2/2025.  Offered patient available office visit with same day provider at the [facility] for 6/2/2025.  Informed patient to arrive 20 minutes prior to check in at the .  Advised patient to seek urgent/ emergency care for worsening symptoms.  Informed patient of OTC hernia belts that may be worth trying if he is thinking it is another hernia.  Patient stated understanding.    Megan Wells RN

## 2025-05-28 NOTE — TELEPHONE ENCOUNTER
Agreed to be evaluated right away - clinic or ER.  Otherwise, I recommend to be seen in office when he is back in town.

## 2025-06-02 ENCOUNTER — OFFICE VISIT (OUTPATIENT)
Dept: FAMILY MEDICINE | Facility: CLINIC | Age: 55
End: 2025-06-02
Payer: COMMERCIAL

## 2025-06-02 VITALS
TEMPERATURE: 97.8 F | SYSTOLIC BLOOD PRESSURE: 149 MMHG | BODY MASS INDEX: 31.06 KG/M2 | RESPIRATION RATE: 10 BRPM | HEIGHT: 75 IN | HEART RATE: 81 BPM | DIASTOLIC BLOOD PRESSURE: 95 MMHG | OXYGEN SATURATION: 97 % | WEIGHT: 249.8 LBS

## 2025-06-02 DIAGNOSIS — R10.31 RIGHT GROIN PAIN: Primary | ICD-10-CM

## 2025-06-02 PROCEDURE — 1125F AMNT PAIN NOTED PAIN PRSNT: CPT

## 2025-06-02 PROCEDURE — 3080F DIAST BP >= 90 MM HG: CPT

## 2025-06-02 PROCEDURE — 3077F SYST BP >= 140 MM HG: CPT

## 2025-06-02 PROCEDURE — 99214 OFFICE O/P EST MOD 30 MIN: CPT

## 2025-06-02 ASSESSMENT — PAIN SCALES - GENERAL: PAINLEVEL_OUTOF10: MODERATE PAIN (4)

## 2025-06-02 NOTE — PATIENT INSTRUCTIONS
ASSESSMENT & PLAN    Possible Hernia:  Clinical evaluation suggests a hernia, although confirmation through imaging such as ultrasound is not performed. The swelling and bulge in the groin area, especially noticeable during activities like coughing or heavy lifting, align with typical hernia symptoms.  Referral to general surgery for further assessment and potential surgical intervention. Avoid lifting heavy objects or activities that may exacerbate the condition. If the hernia becomes irreducible, seek emergency care.    Follow up with general surg, call and schedule

## 2025-06-02 NOTE — PROGRESS NOTES
Assessment & Plan     Right groin pain  - Adult Gen Surg  Referral; Future    ASSESSMENT & PLAN    Possible Hernia:  Clinical evaluation suggests a hernia, although confirmation through imaging such as ultrasound is not performed. The swelling and bulge in the groin area, especially noticeable during activities like coughing or heavy lifting, align with typical hernia symptoms.  Referral to general surgery for further assessment and potential surgical intervention. Avoid lifting heavy objects or activities that may exacerbate the condition. If the hernia becomes irreducible, seek emergency care.        Follow-up       Dmitri Gonzalez is a 54 year old, presenting for the following health issues:  Hernia      6/2/2025     2:40 PM   Additional Questions   Roomed by Lizet         6/2/2025     2:40 PM   Patient Reported Additional Medications   Patient reports taking the following new medications none     History of Present Illness       Reason for visit:  I think I may have a hernia  Symptom onset:  3-4 weeks ago  Symptoms include:  Pain and swelling in my groin  Symptom intensity:  Mild  Symptom progression:  Staying the same  Had these symptoms before:  Yes  Has tried/received treatment for these symptoms:  Yes  Previous treatment was successful:  Yes  Prior treatment description:  I had surgery several years for a previous hernia  What makes it worse:  Lifting, twisting or coughing  What makes it better:  Sitting still-lol   He is taking medications regularly.      DMITRI Gonzalez, a 54-year-old male, reports a possible hernia in the groin area. He has noticed swelling in this region, which his wife also observed. The swelling has been present for approximately three weeks to a month. He experiences pain in the area, particularly during physical activity or when coughing, although it is not painful when he is at rest. Carlos recalls having a hernia about 20 years ago, which was more severe, and he  "managed to push it back in himself. He suspects that recent heavy lifting while working on docks may have triggered the current symptoms, as he felt a familiar sensation the day after the activity. Carlos has been monitoring the swelling at home and has not noticed any involvement of the scrotum. He is concerned about the hernia and is seeking medical evaluation to confirm the diagnosis and discuss potential management options.  OBJECTIVE    Physical exam:    - Swelling noted in the groin area, more pronounced on one side.    - Hernia assessment performed with cough test; hernia felt upon examination.  ASSESSMENT & PLAN    Possible Hernia:  Clinical evaluation suggests a hernia, although confirmation through imaging such as ultrasound is not performed. The swelling and bulge in the groin area, especially noticeable during activities like coughing or heavy lifting, align with typical hernia symptoms.  Referral to general surgery for further assessment and potential surgical intervention. Avoid lifting heavy objects or activities that may exacerbate the condition. If the hernia becomes irreducible, seek emergency care.                  Objective    BP (!) 149/95 (BP Location: Right arm, Patient Position: Sitting, Cuff Size: Adult Regular)   Pulse 81   Temp 97.8  F (36.6  C) (Temporal)   Resp 10   Ht 1.911 m (6' 3.24\")   Wt 113.3 kg (249 lb 12.8 oz)   SpO2 97%   BMI 31.03 kg/m    Body mass index is 31.03 kg/m .  Physical Exam   GENERAL: alert and no distress  EYES: Eyes grossly normal to inspection, PERRL and conjunctivae and sclerae normal   (male): normal male genitalia without lesions or urethral discharge, swelling noted in the right groin, palpable hernia in the right groin  MS: no gross musculoskeletal defects noted, no edema  SKIN: no suspicious lesions or rashes  NEURO: Normal strength and tone, mentation intact and speech normal  PSYCH: mentation appears normal, affect normal/bright    Office Visit on " 04/14/2025   Component Date Value Ref Range Status    Sodium 04/14/2025 137  135 - 145 mmol/L Final    Potassium 04/14/2025 4.1  3.4 - 5.3 mmol/L Final    Carbon Dioxide (CO2) 04/14/2025 29  22 - 29 mmol/L Final    Anion Gap 04/14/2025 9  7 - 15 mmol/L Final    Urea Nitrogen 04/14/2025 16.7  6.0 - 20.0 mg/dL Final    Creatinine 04/14/2025 0.91  0.67 - 1.17 mg/dL Final    GFR Estimate 04/14/2025 >90  >60 mL/min/1.73m2 Final    eGFR calculated using 2021 CKD-EPI equation.    Calcium 04/14/2025 10.1  8.8 - 10.4 mg/dL Final    Chloride 04/14/2025 99  98 - 107 mmol/L Final    Glucose 04/14/2025 98  70 - 99 mg/dL Final    Alkaline Phosphatase 04/14/2025 66  40 - 150 U/L Final    AST 04/14/2025 29  0 - 45 U/L Final    ALT 04/14/2025 35  0 - 70 U/L Final    Protein Total 04/14/2025 8.0  6.4 - 8.3 g/dL Final    Albumin 04/14/2025 4.9  3.5 - 5.2 g/dL Final    Bilirubin Total 04/14/2025 0.5  <=1.2 mg/dL Final    Patient Fasting > 8hrs? 04/14/2025 Yes   Final    half of egg scrambled    Cholesterol 04/14/2025 247 (H)  <200 mg/dL Final    Triglycerides 04/14/2025 515 (H)  <150 mg/dL Final    Direct Measure HDL 04/14/2025 64  >=40 mg/dL Final    LDL Cholesterol Calculated 04/14/2025    Final    Cannot estimate LDL when triglyceride exceeds 400 mg/dL    Non HDL Cholesterol 04/14/2025 183 (H)  <130 mg/dL Final    Patient Fasting > 8hrs? 04/14/2025 Yes   Final    half of egg scrambled  half of egg scrambled  half of egg scrambled  half of egg scrambled    Uric Acid 04/14/2025 6.2  3.4 - 7.0 mg/dL Final    Prostate Specific Antigen Screen 04/14/2025 0.70  0.00 - 3.50 ng/mL Final    LDL Cholesterol Direct 04/14/2025 114 (H)  <100 mg/dL Final    Age 2-19 years:  Desirable: < 110 mg/dL   Borderline High: 110-129 mg/dL   High: >= 130 mg/dL    Age 20 years and older:  Desirable: < 100 mg/dL  Above Desirable: 100-129 mg/dL   Borderline High: 130-159 mg/dL   High: 160-189 mg/dL   Very High: >= 190 mg/dL     No results found for any visits  on 06/02/25.  No results found.        Signed Electronically by: Brenna Pereyra PA-C

## 2025-06-03 ENCOUNTER — TELEPHONE (OUTPATIENT)
Dept: SURGERY | Facility: CLINIC | Age: 55
End: 2025-06-03

## 2025-06-03 ENCOUNTER — OFFICE VISIT (OUTPATIENT)
Dept: SURGERY | Facility: CLINIC | Age: 55
End: 2025-06-03
Payer: COMMERCIAL

## 2025-06-03 VITALS
SYSTOLIC BLOOD PRESSURE: 148 MMHG | WEIGHT: 249 LBS | HEIGHT: 75 IN | BODY MASS INDEX: 30.96 KG/M2 | DIASTOLIC BLOOD PRESSURE: 82 MMHG | TEMPERATURE: 96.6 F

## 2025-06-03 DIAGNOSIS — K40.90 RIGHT INGUINAL HERNIA: Primary | ICD-10-CM

## 2025-06-03 DIAGNOSIS — R10.31 RIGHT GROIN PAIN: ICD-10-CM

## 2025-06-03 PROCEDURE — 99243 OFF/OP CNSLTJ NEW/EST LOW 30: CPT | Performed by: SPECIALIST

## 2025-06-03 PROCEDURE — 3079F DIAST BP 80-89 MM HG: CPT | Performed by: SPECIALIST

## 2025-06-03 PROCEDURE — 1125F AMNT PAIN NOTED PAIN PRSNT: CPT | Performed by: SPECIALIST

## 2025-06-03 PROCEDURE — 3077F SYST BP >= 140 MM HG: CPT | Performed by: SPECIALIST

## 2025-06-03 ASSESSMENT — PAIN SCALES - GENERAL: PAINLEVEL_OUTOF10: MILD PAIN (2)

## 2025-06-03 NOTE — LETTER
6/3/2025      Carlos Keyes  [address]  [facility] [address]      Dear Colleague,    Thank you for referring your patient, Carlos Keyes, to the [facility]. Please see a copy of my visit note below.    Consult requested by Brenna Grey    Reason for consultation: Right inguinal hernia    HPI:  Patient is a 54-year-old white male well-known to me for a laparoscopic preperitoneal left inguinal hernia repair done 20 years ago.  He was doing some heavy lifting [place] his cabin and developed a left groin bulge with pain.  It was similar to the symptoms he had 20 years ago on the left side.  He saw his PCP who diagnosed him with a right inguinal hernia for which she is now referred.    Past Medical History:   Diagnosis Date     Complex tear of medial meniscus of right knee as current injury, initial encounter 08/07/2018     Past Surgical History:   Procedure Laterality Date     ARTHROSCOPY KNEE Left 12/19/2023    Procedure: ARTHROSCOPY, LEFT KNEE meniscal and chondral debridement;  Surgeon: Carlos Barrientos MD;  Location: [facility] OR     ARTHROSCOPY KNEE WITH DEBRIDEMENT JOINT, COMBINED Right 07/25/2018    Procedure: ARTHROSCOPY KNEE WITH DEBRIDEMENT JOINT;  Right knee arthroscopy with medial meniscus debridement;  Surgeon: Seth Basilio MD;  [facility]     COLONOSCOPY N/A 07/16/2019    Procedure: COLONOSCOPY;  Surgeon: Tyler Miles DO;  Location: [facility]     FRACTURE TX, ANKLE RT/LT      Fracture TX Ankle RT/LT     HERNIA REPAIR, INGUINAL RT/LT  10/13/2006    Laparoscopic left inguinal hernia repair.     OPEN REDUCTION INTERNAL FIXATION FOREARM Right 11/05/2014    Procedure: OPEN REDUCTION INTERNAL FIXATION FOREARM;  Surgeon: Seth Basilio MD;  Location: [facility]     REPAIR HAMMER TOE  10/14/2011    Procedure:REPAIR HAMMER TOE; Correction Toes 4th,5th Toe Right Foot; Surgeon:ASIF CRUM; Location:[facility]     VASECTOMY Bilateral 05/06/2005     Current  Outpatient Medications   Medication Sig Dispense Refill     allopurinol (ZYLOPRIM) 100 MG tablet TAKE ONE TABLET BY MOUTH ONCE DAILY 90 tablet 3     simvastatin (ZOCOR) 40 MG tablet Take 1 tablet (40 mg) by mouth at bedtime. 90 tablet 1     No current facility-administered medications for this visit.        Allergies   Allergen Reactions     No Known Drug Allergy      Social History     Socioeconomic History     Marital status:      Spouse name: Anna     Number of children: 3     Years of education: 17     Highest education level: Not on file   Occupational History     Occupation: Self employed.   Tobacco Use     Smoking status: Never     Passive exposure: Never     Smokeless tobacco: Former     Types: Chew     Quit date: 1/1/2015   Vaping Use     Vaping status: Never Used   Substance and Sexual Activity     Alcohol use: Yes     Comment: daily 2 drinks     Drug use: No     Sexual activity: Yes     Partners: Female     Birth control/protection: Male Surgical   Other Topics Concern      Service No     Blood Transfusions No     Caffeine Concern Yes     Comment: Pot of coffee a day     Occupational Exposure Yes     Hobby Hazards Yes     Sleep Concern No     Stress Concern Yes     Weight Concern Yes     Comment: Lose weight     Special Diet No     Back Care No     Exercise Yes     Bike Helmet No     Seat Belt Yes     Self-Exams No     Parent/sibling w/ CABG, MI or angioplasty before 65F 55M? Yes   Social History Narrative     Not on file     Social Drivers of Health     Financial Resource Strain: Low Risk  (4/10/2025)    Financial Resource Strain      Within the past 12 months, have you or your family members you live with been unable to get utilities (heat, electricity) when it was really needed?: No   Food Insecurity: Low Risk  (4/10/2025)    Food Insecurity      Within the past 12 months, did you worry that your food would run out before you got money to buy more?: No      Within the past 12 months,  did the food you bought just not last and you didn t have money to get more?: No   Transportation Needs: Low Risk  (4/10/2025)    Transportation Needs      Within the past 12 months, has lack of transportation kept you from medical appointments, getting your medicines, non-medical meetings or appointments, work, or from getting things that you need?: No   Physical Activity: Insufficiently Active (4/10/2025)    Exercise Vital Sign      Days of Exercise per Week: 3 days      Minutes of Exercise per Session: 40 min   Stress: No Stress Concern Present (4/10/2025)    [employer] [facility] [employer] - Occupational Stress Questionnaire      Feeling of Stress : Not at all   Social Connections: Unknown (4/10/2025)    Social Connection and Isolation Panel [NHANES]      Frequency of Communication with Friends and Family: Not on file      Frequency of Social Gatherings with Friends and Family: Three times a week      Attends [employer] Services: Not on file      Active Member of Clubs or Organizations: Not on file      Attends Club or Organization Meetings: Not on file      Marital Status: Not on file   Interpersonal Safety: Low Risk  (2025)    Interpersonal Safety      Do you feel physically and emotionally safe where you currently live?: Yes      Within the past 12 months, have you been hit, slapped, kicked or otherwise physically hurt by someone?: No      Within the past 12 months, have you been humiliated or emotionally abused in other ways by your partner or ex-partner?: No   Housing Stability: High Risk (4/10/2025)    Housing Stability      Do you have housing? : No      Are you worried about losing your housing?: No     Family History   Problem Relation Age of Onset     Coronary Artery Disease Mother          of MI at 55     Hyperlipidemia Mother      Hyperlipidemia Father      No Known Problems Sister      No Known Problems Sister      No Known Problems Brother      C.A.D. Maternal Grandfather      No  Known Problems Son      No Known Problems Daughter      No Known Problems Daughter      Diabetes No family hx of      Cancer - colorectal No family hx of       ROS: 10 point ROS neg other than the symptoms noted above in the HPI.    PE:  B/P: 148/82, T: 96.6, P: Data Unavailable, R: Data Unavailable  General: well developed, well nourished WM who appears their stated age  HEENT: NC/AT, EOMI, (-)icterus, (-)injection  Neck: Supple, No JVD  Chest: CTA  Heart: S1, S2, (-)m/r/g  Abd: Soft, non tender, non distended, non tender, no masses, reducible right inguinal hernia.  Intact left inguinal hernia repair.  Ext; Warm, no edema  Psych: AAOx3  Neuro: No focal deficits      Impression/plan:  This a 54-year-old gentleman well-known to me for a laparoscopic preperitoneal hernia repair done 20 years ago.  He now returns with a right inguinal hernia.  I did discuss some concern about scar tissue in the preperitoneal space on the right due to the previous dissection.  He expressed understanding.  After discussion the patient the plan at this time is for diagnostic laparoscopy with possible right robotic repair versus open repair.   The procedure, risks, benefits, and alternatives were discussed and the patient agrees to proceed.  He will be scheduled in the near future.    Luciano Jewell MD, FACS      Again, thank you for allowing me to participate in the care of your patient.        Sincerely,        Luciano Jewell MD    Electronically signed

## 2025-06-03 NOTE — PROGRESS NOTES
Consult requested by Brenna Grey    Reason for consultation: Right inguinal hernia    HPI:  Patient is a 54-year-old white male well-known to me for a laparoscopic preperitoneal left inguinal hernia repair done 20 years ago.  He was doing some [place] lifting [place] and developed a left groin bulge with pain.  It was similar to the symptoms he had 20 years ago on the left side.  He saw his PCP who diagnosed him with a right inguinal hernia for which she is now referred.    Past Medical History:   Diagnosis Date    Complex tear of medial meniscus of right knee as current injury, initial encounter 08/07/2018     Past Surgical History:   Procedure Laterality Date    ARTHROSCOPY KNEE Left 12/19/2023    Procedure: ARTHROSCOPY, LEFT KNEE meniscal and chondral debridement;  Surgeon: Carlos Barrientos MD;  Location: [facility]    ARTHROSCOPY KNEE WITH DEBRIDEMENT JOINT, COMBINED Right 07/25/2018    Procedure: ARTHROSCOPY KNEE WITH DEBRIDEMENT JOINT;  Right knee arthroscopy with medial meniscus debridement;  Surgeon: Seth Basilio MD;  [facility]    COLONOSCOPY N/A 07/16/2019    Procedure: COLONOSCOPY;  Surgeon: Tyler Miles DO;  [facility]    FRACTURE TX, ANKLE RT/LT      Fracture TX Ankle RT/LT    HERNIA REPAIR, INGUINAL RT/LT  10/13/2006    Laparoscopic left inguinal hernia repair.    OPEN REDUCTION INTERNAL FIXATION FOREARM Right 11/05/2014    Procedure: OPEN REDUCTION INTERNAL FIXATION FOREARM;  Surgeon: Seth Basilio MD;  [facility]    REPAIR HAMMER TOE  10/14/2011    Procedure:REPAIR HAMMER TOE; Correction Toes 4th,5th Toe Right Foot; Surgeon:ASIF CRUM; Location:[facility]    VASECTOMY Bilateral 05/06/2005     Current Outpatient Medications   Medication Sig Dispense Refill    allopurinol (ZYLOPRIM) 100 MG tablet TAKE ONE TABLET BY MOUTH ONCE DAILY 90 tablet 3    simvastatin (ZOCOR) 40 MG tablet Take 1 tablet (40 mg) by mouth at bedtime. 90 tablet 1     No current  facility-administered medications for this visit.        Allergies   Allergen Reactions    No Known Drug Allergy      Social History     Socioeconomic History    Marital status:      Spouse name: Anna    Number of children: 3    Years of education: 17    Highest education level: Not on file   Occupational History    Occupation: Self employed.   Tobacco Use    Smoking status: Never     Passive exposure: Never    Smokeless tobacco: Former     Types: Chew     Quit date: 1/1/2015   Vaping Use    Vaping status: Never Used   Substance and Sexual Activity    Alcohol use: Yes     Comment: daily 2 drinks    Drug use: No    Sexual activity: Yes     Partners: Female     Birth control/protection: Male Surgical   Other Topics Concern     Service No    Blood Transfusions No    Caffeine Concern Yes     Comment: Pot of coffee a day    Occupational Exposure Yes    Hobby Hazards Yes    Sleep Concern No    Stress Concern Yes    Weight Concern Yes     Comment: Lose weight    Special Diet No    Back Care No    Exercise Yes    Bike Helmet No    Seat Belt Yes    Self-Exams No    Parent/sibling w/ CABG, MI or angioplasty before 65F 55M? Yes   Social History Narrative    Not on file     Social Drivers of Health     Financial Resource Strain: Low Risk  (4/10/2025)    Financial Resource Strain     Within the past 12 months, have you or your family members you live with been unable to get utilities (heat, electricity) when it was really needed?: No   Food Insecurity: Low Risk  (4/10/2025)    Food Insecurity     Within the past 12 months, did you worry that your food would run out before you got money to buy more?: No     Within the past 12 months, did the food you bought just not last and you didn t have money to get more?: No   Transportation Needs: Low Risk  (4/10/2025)    Transportation Needs     Within the past 12 months, has lack of transportation kept you from medical appointments, getting your medicines, non-medical  meetings or appointments, work, or from getting things that you need?: No   Physical Activity: Insufficiently Active (4/10/2025)    Exercise Vital Sign     Days of Exercise per Week: 3 days     Minutes of Exercise per Session: 40 min   Stress: No Stress Concern Present (4/10/2025)    [employer] - Occupational Stress Questionnaire     Feeling of Stress : Not at all   Social Connections: Unknown (4/10/2025)    Social Connection and Isolation Panel [NHANES]     Frequency of Communication with Friends and Family: Not on file     Frequency of Social Gatherings with Friends and Family: Three times a week     Attends Muslim Services: Not on file     Active Member of Clubs or Organizations: Not on file     Attends Club or Organization Meetings: Not on file     Marital Status: Not on file   Interpersonal Safety: Low Risk  (2025)    Interpersonal Safety     Do you feel physically and emotionally safe where you currently live?: Yes     Within the past 12 months, have you been hit, slapped, kicked or otherwise physically hurt by someone?: No     Within the past 12 months, have you been humiliated or emotionally abused in other ways by your partner or ex-partner?: No   Housing Stability: High Risk (4/10/2025)    Housing Stability     Do you have housing? : No     Are you worried about losing your housing?: No     Family History   Problem Relation Age of Onset    Coronary Artery Disease Mother          of MI at 55    Hyperlipidemia Mother     Hyperlipidemia Father     No Known Problems Sister     No Known Problems Sister     No Known Problems Brother     C.A.D. Maternal Grandfather     No Known Problems Son     No Known Problems Daughter     No Known Problems Daughter     Diabetes No family hx of     Cancer - colorectal No family hx of       ROS: 10 point ROS neg other than the symptoms noted above in the HPI.    PE:  B/P: 148/82, T: 96.6, P: Data Unavailable, R: Data  Unavailable  General: well developed, well nourished WM who appears their stated age  HEENT: NC/AT, EOMI, (-)icterus, (-)injection  Neck: Supple, No JVD  Chest: CTA  Heart: S1, S2, (-)m/r/g  Abd: Soft, non tender, non distended, non tender, no masses, reducible right inguinal hernia.  Intact left inguinal hernia repair.  Ext; Warm, no edema  Psych: AAOx3  Neuro: No focal deficits      Impression/plan:  This a 54-year-old gentleman well-known to me for a laparoscopic preperitoneal hernia repair done 20 years ago.  He now returns with a right inguinal hernia.  I did discuss some concern about scar tissue in the preperitoneal space on the right due to the previous dissection.  He expressed understanding.  After discussion the patient the plan at this time is for diagnostic laparoscopy with possible right robotic repair versus open repair.   The procedure, risks, benefits, and alternatives were discussed and the patient agrees to proceed.  He will be scheduled in the near future.    Luciano Jewell MD, FACS

## 2025-06-03 NOTE — TELEPHONE ENCOUNTER
Type of surgery: LAPAROSCOPY, DIAGNOSTIC, BY GENERAL SURGERY (N/A)   HERNIORRHAPHY, INGUINAL, ROBOT-ASSISTED, LAPAROSCOPIC, USING DA ERMIAS XI (Right)   HERNIORRHAPHY, INGUINAL, OPEN   Location of surgery: [facility] OR  Date and time of surgery: 7/1/25 1230 PM  Surgeon: Best  Pre-Op Appt Date: 6/16  Post-Op Appt Date: 7/10  Packet sent out: Yes  Pre-cert/Authorization completed:  Not Applicable  Date: na

## 2025-06-16 ENCOUNTER — OFFICE VISIT (OUTPATIENT)
Dept: FAMILY MEDICINE | Facility: CLINIC | Age: 55
End: 2025-06-16
Payer: COMMERCIAL

## 2025-06-16 VITALS
WEIGHT: 248.8 LBS | TEMPERATURE: 98.2 F | SYSTOLIC BLOOD PRESSURE: 128 MMHG | BODY MASS INDEX: 30.93 KG/M2 | OXYGEN SATURATION: 98 % | RESPIRATION RATE: 18 BRPM | HEIGHT: 75 IN | HEART RATE: 85 BPM | DIASTOLIC BLOOD PRESSURE: 86 MMHG

## 2025-06-16 DIAGNOSIS — Z01.818 PREOP GENERAL PHYSICAL EXAM: Primary | ICD-10-CM

## 2025-06-16 PROCEDURE — 3074F SYST BP LT 130 MM HG: CPT | Performed by: FAMILY MEDICINE

## 2025-06-16 PROCEDURE — 1126F AMNT PAIN NOTED NONE PRSNT: CPT | Performed by: FAMILY MEDICINE

## 2025-06-16 PROCEDURE — 3079F DIAST BP 80-89 MM HG: CPT | Performed by: FAMILY MEDICINE

## 2025-06-16 PROCEDURE — 99214 OFFICE O/P EST MOD 30 MIN: CPT | Performed by: FAMILY MEDICINE

## 2025-06-16 ASSESSMENT — PAIN SCALES - GENERAL: PAINLEVEL_OUTOF10: NO PAIN (0)

## 2025-06-16 NOTE — PATIENT INSTRUCTIONS
How to Take Your Medication Before Surgery  Preoperative Medication Instructions   Antiplatelet or Anticoagulation Medication Instructions   - We reviewed the medication list and the patient is not on an antiplatelet or anticoagulation medications.    Additional Medication Instructions  Take all scheduled medications on the day of surgery       Patient Education   Preparing for Your Surgery  For Adults  Getting started  In most cases, a nurse will call to review your health history and instructions. They will give you an arrival time based on your scheduled surgery time. Please be ready to share:  Your doctor's clinic name and phone number  Your medical, surgical, and anesthesia history  A list of allergies and sensitivities  A list of medicines, including herbal treatments and over-the-counter drugs  Whether the patient has a legal guardian (ask how to send us the papers in advance)  Note: You may not receive a call if you were seen at our PAC (Preoperative Assessment Center).  Please tell us if you're pregnant--or if there's any chance you might be pregnant. Some surgeries may injure a fetus (unborn baby), so they require a pregnancy test. Surgeries that are safe for a fetus don't always need a test, and you can choose whether to have one.   Preparing for surgery  Within 10 to 30 days of surgery: Have a pre-op exam (sometimes called an H&P, or History and Physical). This can be done at a clinic or pre-operative center.  If you're having a , you may not need this exam. Talk to your care team.  At your pre-op exam, talk to your care team about all medicines you take. (This includes CBD oil and any drugs, such as THC, marijuana, and other forms of cannabis.) If you need to stop any medicine before surgery, ask when to start taking it again.  This is for your safety. Many medicines and drugs can make you bleed too much during surgery. Some change how well surgery (anesthesia) drugs work.  Call your insurance  company to let them know you're having surgery. (If you don't have insurance, call [phone].)  Call your clinic if there's any change in your health. This includes a scrape or scratch near the surgery site, or any signs of a cold (sore throat, runny nose, cough, rash, fever).  Eating and drinking guidelines  For your safety: Unless your surgeon tells you otherwise, follow the guidelines below.  Eat and drink as normal until 8 hours before you arrive for surgery. After that, no food or milk. You can spit out gum when you arrive.  Drink clear liquids until 2 hours before you arrive. These are liquids you can see through, like water, Gatorade, and Propel Water. They also include plain black coffee and tea (no cream or milk).  No alcohol for 24 hours before you arrive. The night before surgery, stop any drinks that contain THC.  If your care team tells you to take medicine on the morning of surgery, it's okay to take it with a sip of water. No other medicines or drugs are allowed (including CBD oil)--follow your care team's instructions.  If you have questions the day of surgery, call your hospital or surgery center.   Preventing infection  Shower or bathe the night before and the morning of surgery. Follow the instructions your clinic gave you. (If no instructions, use regular soap.)  Don't shave or clip hair near your surgery site. We'll remove the hair if needed.  Don't smoke or vape the morning of surgery. No chewing tobacco for 6 hours before you arrive. A nicotine patch is okay. You may spit out nicotine gum when you arrive.  For some surgeries, the surgeon will tell you to fully quit smoking and nicotine.  We will make every effort to keep you safe from infection. We will:  Clean our hands often with soap and water (or an alcohol-based hand rub).  Clean the skin at your surgery site with a special soap that kills germs.  Give you a special gown to keep you warm. (Cold raises the risk of infection.)  Wear hair  covers, masks, gowns, and gloves during surgery.  Give antibiotic medicine, if prescribed. Not all surgeries need this medicine.  What to bring on the day of surgery  Photo ID and insurance card  Copy of your health care directive, if you have one  Glasses and hearing aids (bring cases)  You can't wear contacts during surgery  Inhaler and eye drops, if you use them (tell us about these when you arrive)  CPAP machine or breathing device, if you use them  A few personal items, if spending the night  If you have . . .  A pacemaker, ICD (cardiac defibrillator), or other implant: Bring the ID card.  An implanted stimulator: Bring the remote control.  A legal guardian: Bring a copy of the certified (court-stamped) guardianship papers.  Please remove any jewelry, including body piercings. Leave jewelry and other valuables at home.  If you're going home the day of surgery  You must have a support person drive you home. They should stay with you overnight, and they may need to help with your self-care.  If you don't have a support person, please tells us as soon as possible. We can help.  After surgery  If it's hard to control your pain or you need more pain medicine, please call your surgeon's office.  Questions?   If you have any questions for your care team, list them here:   ____________________________________________________________________________________________________________________________________________________________________________________________________________________________________________________________  For informational purposes only. Not to replace the advice of your health care provider. Copyright   2003, 2019 [facility]. All rights reserved. Clinically reviewed by Tomi Moss MD. [employer] [id number] - REV 02/25.

## 2025-06-16 NOTE — PROGRESS NOTES
Preoperative Evaluation  [address] [address]  Phone: [phone]  Fax: [phone]  Primary Provider: Narciso Mancia Mai, MD  Pre-op Performing Provider: Isidro Pappas MD  Jun 16, 2025 6/11/2025   Surgical Information   What procedure is being done? hernia surgery   Facility or Hospital where procedure/surgery will be performed: [facility]   Who is doing the procedure / surgery? Dr Jewell   Date of surgery / procedure: 7/1/25   Time of surgery / procedure: 10:00   Where do you plan to recover after surgery? at home with family     Fax number for surgical facility: Note does not need to be faxed, will be available electronically in Epic.    The longitudinal plan of care for the diagnosis(es)/condition(s) as documented were addressed during this visit. Due to the added complexity in care, I will continue to support Carlos in the subsequent management and with ongoing continuity of care.    Assessment & Plan     The proposed surgical procedure is considered INTERMEDIATE risk.    ASSESSMENT/ORDERS:    ICD-10-CM    1. Preop general physical exam  Z01.818               - No identified additional risk factors other than previously addressed    Preoperative Medication Instructions  Antiplatelet or Anticoagulation Medication Instructions   - We reviewed the medication list and the patient is not on an antiplatelet or anticoagulation medications.    Additional Medication Instructions  Take all scheduled medications on the day of surgery    Recommendation  Approval given to proceed with proposed procedure, without further diagnostic evaluation.        Lane Gonzalez is a 54 year old, presenting for the following:  Pre-Op Exam          6/16/2025     8:44 AM   Additional Questions   Roomed by Machelle SULLIVAN     HPI: right inguinal hernia discovered 5-6 weeks ago. Painful and recommended to have it repaired.          6/11/2025   Pre-Op Questionnaire    Have you ever had a heart attack or stroke? No   Have you ever had surgery on your heart or blood vessels, such as a stent placement, a coronary artery bypass, or surgery on an artery in your head, neck, heart, or legs? No   Do you have chest pain with activity? No   Do you have a history of heart failure? No   Do you currently have a cold, bronchitis or symptoms of other infection? No   Do you have a cough, shortness of breath, or wheezing? No   Do you or anyone in your family have previous history of blood clots? No   Do you or does anyone in your family have a serious bleeding problem such as prolonged bleeding following surgeries or cuts? No   Have you ever had problems with anemia or been told to take iron pills? No   Have you had any abnormal blood loss such as black, tarry or bloody stools? No   Have you ever had a blood transfusion? No   Are you willing to have a blood transfusion if it is medically needed before, during, or after your surgery? Yes   Have you or any of your relatives ever had problems with anesthesia? No   Do you have sleep apnea, excessive snoring or daytime drowsiness? No   Do you have any artifical heart valves or other implanted medical devices like a pacemaker, defibrillator, or continuous glucose monitor? No   Do you have artificial joints? No   Are you allergic to latex? No     Advance Care Planning    Document on file is a Health Care Directive or POLST.    Preoperative Review of    reviewed - no record of controlled substances prescribed.      Status of Chronic Conditions:  See problem list for active medical problems.  Problems all longstanding and stable, except as noted/documented.  See ROS for pertinent symptoms related to these conditions.    Patient Active Problem List    Diagnosis Date Noted    Obesity 04/21/2024     Priority: Medium    Idiopathic chronic gout of multiple sites without tophus 12/15/2023     Priority: Medium    S/P arthroscopy of right knee 08/07/2018      Priority: Medium    FH CAD - mother  of MI at 53 2015     Priority: Medium     Mom  age 55      HYPERLIPIDEMIA LDL GOAL <130 10/31/2010     Priority: Medium      Past Medical History:   Diagnosis Date    Complex tear of medial meniscus of right knee as current injury, initial encounter 2018     Past Surgical History:   Procedure Laterality Date    ARTHROSCOPY KNEE Left 2023    Procedure: ARTHROSCOPY, LEFT KNEE meniscal and chondral debridement;  Surgeon: Carlos Barrientos MD;  Location: [facility]    ARTHROSCOPY KNEE WITH DEBRIDEMENT JOINT, COMBINED Right 2018    Procedure: ARTHROSCOPY KNEE WITH DEBRIDEMENT JOINT;  Right knee arthroscopy with medial meniscus debridement;  Surgeon: Seth Basilio MD;  Location: [facility]    COLONOSCOPY N/A 2019    Procedure: COLONOSCOPY;  Surgeon: Tyler Miles DO;  Location: [facility]    FRACTURE TX, ANKLE RT/LT      Fracture TX Ankle RT/LT    HERNIA REPAIR, INGUINAL RT/LT  10/13/2006    Laparoscopic left inguinal hernia repair.    OPEN REDUCTION INTERNAL FIXATION FOREARM Right 2014    Procedure: OPEN REDUCTION INTERNAL FIXATION FOREARM;  Surgeon: Seth Basilio MD;  Location: [facility]    REPAIR HAMMER TOE  10/14/2011    Procedure:REPAIR HAMMER TOE; Correction Toes 4th,5th Toe Right Foot; Surgeon:ASIF CRUM; Location:[facility]    VASECTOMY Bilateral 2005     Current Outpatient Medications   Medication Sig Dispense Refill    allopurinol (ZYLOPRIM) 100 MG tablet TAKE ONE TABLET BY MOUTH ONCE DAILY 90 tablet 3    simvastatin (ZOCOR) 40 MG tablet Take 1 tablet (40 mg) by mouth at bedtime. 90 tablet 1       Allergies   Allergen Reactions    No Known Drug Allergy         Social History     Tobacco Use    Smoking status: Never     Passive exposure: Never    Smokeless tobacco: Former     Types: Chew     Quit date: 2015   Substance Use Topics    Alcohol use: Yes     Comment: daily 2 drinks       History   Drug Use No  "              Objective    /86   Pulse 85   Temp 98.2  F (36.8  C) (Temporal)   Resp 18   Ht 1.911 m (6' 3.24\")   Wt 112.9 kg (248 lb 12.8 oz)   SpO2 98%   BMI 30.90 kg/m     Estimated body mass index is 30.9 kg/m  as calculated from the following:    Height as of this encounter: 1.911 m (6' 3.24\").    Weight as of this encounter: 112.9 kg (248 lb 12.8 oz).  Physical Exam  GENERAL: alert and no distress  EYES: Eyes grossly normal to inspection, PERRL and conjunctivae and sclerae normal  HENT: ear canals and TM's normal, nose and mouth without ulcers or lesions  NECK: no adenopathy, no asymmetry, masses, or scars  RESP: lungs clear to auscultation - no rales, rhonchi or wheezes  CV: regular rate and rhythm, normal S1 S2, no S3 or S4, no murmur, click or rub, no peripheral edema  ABDOMEN: soft, nontender, no hepatosplenomegaly, no masses and bowel sounds normal  MS: no gross musculoskeletal defects noted, no edema  SKIN: no suspicious lesions or rashes  NEURO: Normal strength and tone, mentation intact and speech normal  PSYCH: mentation appears normal, affect normal/bright    Recent Labs   Lab Test 04/14/25  0925      POTASSIUM 4.1   CR 0.91        Diagnostics  No labs were ordered during this visit.   No EKG required, no history of coronary heart disease, significant arrhythmia, peripheral arterial disease or other structural heart disease.    Revised Cardiac Risk Index (RCRI)  The patient has the following serious cardiovascular risks for perioperative complications:   - No serious cardiac risks = 0 points     RCRI Interpretation: 0 points: Class I (very low risk - 0.4% complication rate)         Signed Electronically by: Isidro Pappas MD  A copy of this evaluation report is provided to the requesting physician.         "

## 2025-06-30 ENCOUNTER — ANESTHESIA EVENT (OUTPATIENT)
Dept: SURGERY | Facility: CLINIC | Age: 55
End: 2025-06-30
Payer: COMMERCIAL

## 2025-06-30 NOTE — ANESTHESIA PREPROCEDURE EVALUATION
Anesthesia Pre-Procedure Evaluation    Patient: Carlos Keyes   MRN: [id number] : 1970          Procedure : Procedure(s):  LAPAROSCOPY, DIAGNOSTIC  possible robotic right inguinal hernia repair  possible open right inguinal hernia repair         Past Medical History:   Diagnosis Date    Complex tear of medial meniscus of right knee as current injury, initial encounter 2018      Past Surgical History:   Procedure Laterality Date    ARTHROSCOPY KNEE Left 2023    Procedure: ARTHROSCOPY, LEFT KNEE meniscal and chondral debridement;  Surgeon: Carlos Barrientos MD;  Location: [facility]    ARTHROSCOPY KNEE WITH DEBRIDEMENT JOINT, COMBINED Right 2018    Procedure: ARTHROSCOPY KNEE WITH DEBRIDEMENT JOINT;  Right knee arthroscopy with medial meniscus debridement;  Surgeon: Seth Basilio MD;  [facility]    COLONOSCOPY N/A 2019    Procedure: COLONOSCOPY;  Surgeon: Tyler Miles DO;  [facility]    FRACTURE TX, ANKLE RT/LT      Fracture TX Ankle RT/LT    HERNIA REPAIR, INGUINAL RT/LT  10/13/2006    Laparoscopic left inguinal hernia repair.    OPEN REDUCTION INTERNAL FIXATION FOREARM Right 2014    Procedure: OPEN REDUCTION INTERNAL FIXATION FOREARM;  Surgeon: Seth Basilio MD;  [facility]: [facility]    REPAIR HAMMER TOE  10/14/2011    Procedure:REPAIR HAMMER TOE; Correction Toes 4th,5th Toe Right Foot; Surgeon:ASIF CRUM; Location:[facility]    VASECTOMY Bilateral 2005      Allergies   Allergen Reactions    No Known Drug Allergy       Social History     Tobacco Use    Smoking status: Never     Passive exposure: Never    Smokeless tobacco: Former     Types: Chew     Quit date: 2015   Substance Use Topics    Alcohol use: Yes     Comment: daily 2 drinks      Wt Readings from Last 1 Encounters:   25 112.9 kg (248 lb 12.8 oz)        Anesthesia Evaluation   Pt has had prior anesthetic. Type: General and MAC.        ROS/MED HX  ENT/Pulmonary:  - neg  pulmonary ROS     Neurologic:  - neg neurologic ROS     Cardiovascular:     (+) Dyslipidemia - -   -  - -                                 Previous cardiac testing   Echo: Date: 11/25/14 Results:  Baseline  The patient is in normal sinus rhythm.  Minor nonspecific ST depression in lead III.  Normal left ventricular wall motion.  The visual ejection fraction is estimated at 60-65%.     Stress  Exercise was stopped due to fatigue.  Mild, continuous 1/10 chest discomfort at rest that did not change with   stress.  The patient exercised 10:31 on the Mark Protocol.  No arrhythmia noted.  The EKG portion of this stress test was negative for inducible ischemia (see   echo results below).  There were no ST segment changes observed with stress.  Normal resting wall motion and no stress-induced wall motion abnormality.  This was a normal stress echocardiogram.     Mitral Valve  The mitral valve is normal in structure and function.  There is trace mitral regurgitation.     Tricuspid Valve  The tricuspid valve is normal in structure and function.  There is trace tricuspid regurgitation.     Aortic Valve  The aortic valve is normal in structure and function.  No aortic regurgitation is present.  No aortic stenosis is present.       Stress Test:  Date: Results:    ECG Reviewed:  Date: 4/14/22 Results:  Sinus  Rhythm   WITHIN NORMAL LIMITS  Cath:  Date: Results:      METS/Exercise Tolerance: >4 METS    Hematologic:  - neg hematologic  ROS     Musculoskeletal:  - neg musculoskeletal ROS     GI/Hepatic:  - neg GI/hepatic ROS     Renal/Genitourinary:  - neg Renal ROS     Endo: Comment: BMI: 30.9    (+)               Obesity,       Psychiatric/Substance Use:  - neg psychiatric ROS     Infectious Disease:  - neg infectious disease ROS     Malignancy:  - neg malignancy ROS     Other:  - neg other ROS            Physical Exam  Airway  Mallampati: II  TM distance: >3 FB  Neck ROM: full  Upper bite lip test: I  Mouth opening: >= 4  "cm    Cardiovascular   Rhythm: regular  Rate: normal rate     Dental   (+) Completely normal teeth      Pulmonary - normal examBreath sounds clear to auscultation        Neurological - normal exam  He appears awake, alert and oriented x3.    Other Findings       OUTSIDE LABS:  CBC:   Lab Results   Component Value Date    WBC 4.3 04/14/2022    WBC 5.2 06/19/2019    HGB 14.3 04/14/2022    HGB 13.9 12/04/2014    HCT 42.2 04/14/2022    HCT 42.7 12/04/2014     04/14/2022     12/04/2014     BMP:   Lab Results   Component Value Date     04/14/2025     04/09/2024    POTASSIUM 4.1 04/14/2025    POTASSIUM 3.8 04/09/2024    CHLORIDE 99 04/14/2025    CHLORIDE 104 04/09/2024    CO2 29 04/14/2025    CO2 28 04/09/2024    BUN 16.7 04/14/2025    BUN 10.5 04/09/2024    CR 0.91 04/14/2025    CR 0.93 04/09/2024    GLC 98 04/14/2025    GLC 95 04/09/2024     COAGS: No results found for: \"PTT\", \"INR\", \"FIBR\"  POC: No results found for: \"BGM\", \"HCG\", \"HCGS\"  HEPATIC:   Lab Results   Component Value Date    ALBUMIN 4.9 04/14/2025    PROTTOTAL 8.0 04/14/2025    ALT 35 04/14/2025    AST 29 04/14/2025    ALKPHOS 66 04/14/2025    BILITOTAL 0.5 04/14/2025     OTHER:   Lab Results   Component Value Date    LACT 0.8 11/25/2014    MALIKA 10.1 04/14/2025    TSH 2.07 04/14/2022    CRP 13.6 (H) 12/04/2014    SED 7 06/19/2019       Anesthesia Plan    ASA Status:  2      NPO Status: NPO Appropriate   Anesthesia Type: Peripheral Nerve Block.  Airway: oral.  Induction: intravenous.  Maintenance: Balanced.   Techniques and Equipment:       - Monitoring Plan: standard ASA monitoring     Consents    Anesthesia Plan(s) and associated risks, benefits, and realistic alternatives discussed. Questions answered and patient/representative(s) expressed understanding.     - Discussed: CRNA     - Discussed with:  Patient        - Pt is DNR/DNI Status: no DNR     Blood Consent:      - Discussed with: not discussed.     Postoperative Care    Pain " "management: non-narcotic analgesics, plan for postoperative opioid use.     Comments:    Other Comments: GETA (possible bilateral TAP block if proceed to robotic assisted procedure), standard ASA monitors discussed with pt., he consents to anesthetic approach and wishes to proceed accepting of risks.               CIRILO Mckeon CRNA    I have reviewed the pertinent notes and labs in the chart from the past 30 days and (re)examined the patient.  Any updates or changes from those notes are reflected in this note.    Clinically Significant Risk Factors Present on Admission                             # Obesity: Estimated body mass index is 30.9 kg/m  as calculated from the following:    Height as of 6/16/25: 1.911 m (6' 3.24\").    Weight as of 6/16/25: 112.9 kg (248 lb 12.8 oz).                    "

## 2025-07-01 ENCOUNTER — HOSPITAL ENCOUNTER (OUTPATIENT)
Facility: CLINIC | Age: 55
Discharge: HOME | End: 2025-07-01
Attending: SPECIALIST | Admitting: SPECIALIST
Payer: COMMERCIAL

## 2025-07-01 ENCOUNTER — ANESTHESIA (OUTPATIENT)
Dept: SURGERY | Facility: CLINIC | Age: 55
End: 2025-07-01
Payer: COMMERCIAL

## 2025-07-01 VITALS
SYSTOLIC BLOOD PRESSURE: 115 MMHG | OXYGEN SATURATION: 97 % | HEART RATE: 68 BPM | DIASTOLIC BLOOD PRESSURE: 72 MMHG | TEMPERATURE: 98 F | RESPIRATION RATE: 16 BRPM

## 2025-07-01 DIAGNOSIS — G89.18 POST-OP PAIN: Primary | ICD-10-CM

## 2025-07-01 PROCEDURE — 999N000141 HC STATISTIC PRE-PROCEDURE NURSING ASSESSMENT: Performed by: SPECIALIST

## 2025-07-01 PROCEDURE — 271N000001 HC OR GENERAL SUPPLY NON-STERILE: Performed by: SPECIALIST

## 2025-07-01 PROCEDURE — 250N000011 HC RX IP 250 OP 636: Performed by: SPECIALIST

## 2025-07-01 PROCEDURE — C1781 MESH (IMPLANTABLE): HCPCS | Performed by: SPECIALIST

## 2025-07-01 PROCEDURE — 250N000025 HC SEVOFLURANE, PER MIN: Performed by: SPECIALIST

## 2025-07-01 PROCEDURE — 272N000001 HC OR GENERAL SUPPLY STERILE: Performed by: SPECIALIST

## 2025-07-01 PROCEDURE — 710N000012 HC RECOVERY PHASE 2, PER MINUTE: Performed by: SPECIALIST

## 2025-07-01 PROCEDURE — 250N000011 HC RX IP 250 OP 636: Performed by: NURSE ANESTHETIST, CERTIFIED REGISTERED

## 2025-07-01 PROCEDURE — 370N000017 HC ANESTHESIA TECHNICAL FEE, PER MIN: Performed by: SPECIALIST

## 2025-07-01 PROCEDURE — 258N000003 HC RX IP 258 OP 636: Performed by: NURSE ANESTHETIST, CERTIFIED REGISTERED

## 2025-07-01 PROCEDURE — 250N000009 HC RX 250: Performed by: NURSE ANESTHETIST, CERTIFIED REGISTERED

## 2025-07-01 PROCEDURE — S2900 ROBOTIC SURGICAL SYSTEM: HCPCS | Performed by: SPECIALIST

## 2025-07-01 PROCEDURE — 360N000080 HC SURGERY LEVEL 7, PER MIN: Performed by: SPECIALIST

## 2025-07-01 PROCEDURE — 710N000010 HC RECOVERY PHASE 1, LEVEL 2, PER MIN: Performed by: SPECIALIST

## 2025-07-01 PROCEDURE — 49650 LAP ING HERNIA REPAIR INIT: CPT | Mod: RT | Performed by: SPECIALIST

## 2025-07-01 PROCEDURE — 250N000009 HC RX 250: Performed by: SPECIALIST

## 2025-07-01 PROCEDURE — 250N000013 HC RX MED GY IP 250 OP 250 PS 637: Performed by: SPECIALIST

## 2025-07-01 DEVICE — MESH 3DMAX RIGHT LG 0115321: Type: IMPLANTABLE DEVICE | Site: INGUINAL | Status: FUNCTIONAL

## 2025-07-01 RX ORDER — NALOXONE HYDROCHLORIDE 0.4 MG/ML
0.1 INJECTION, SOLUTION INTRAMUSCULAR; INTRAVENOUS; SUBCUTANEOUS
Status: DISCONTINUED | OUTPATIENT
Start: 2025-07-01 | End: 2025-07-01 | Stop reason: HOSPADM

## 2025-07-01 RX ORDER — FENTANYL CITRATE 50 UG/ML
INJECTION, SOLUTION INTRAMUSCULAR; INTRAVENOUS PRN
Status: DISCONTINUED | OUTPATIENT
Start: 2025-07-01 | End: 2025-07-01

## 2025-07-01 RX ORDER — ONDANSETRON 2 MG/ML
INJECTION INTRAMUSCULAR; INTRAVENOUS PRN
Status: DISCONTINUED | OUTPATIENT
Start: 2025-07-01 | End: 2025-07-01

## 2025-07-01 RX ORDER — LIDOCAINE 40 MG/G
CREAM TOPICAL
Status: DISCONTINUED | OUTPATIENT
Start: 2025-07-01 | End: 2025-07-01 | Stop reason: HOSPADM

## 2025-07-01 RX ORDER — BUPIVACAINE HYDROCHLORIDE AND EPINEPHRINE 2.5; 5 MG/ML; UG/ML
INJECTION, SOLUTION EPIDURAL; INFILTRATION; INTRACAUDAL; PERINEURAL PRN
Status: DISCONTINUED | OUTPATIENT
Start: 2025-07-01 | End: 2025-07-01 | Stop reason: HOSPADM

## 2025-07-01 RX ORDER — KETOROLAC TROMETHAMINE 30 MG/ML
INJECTION, SOLUTION INTRAMUSCULAR; INTRAVENOUS PRN
Status: DISCONTINUED | OUTPATIENT
Start: 2025-07-01 | End: 2025-07-01

## 2025-07-01 RX ORDER — ONDANSETRON 4 MG/1
4 TABLET, ORALLY DISINTEGRATING ORAL EVERY 30 MIN PRN
Status: DISCONTINUED | OUTPATIENT
Start: 2025-07-01 | End: 2025-07-01 | Stop reason: HOSPADM

## 2025-07-01 RX ORDER — METHOCARBAMOL 100 MG/ML
INJECTION, SOLUTION INTRAMUSCULAR; INTRAVENOUS PRN
Status: DISCONTINUED | OUTPATIENT
Start: 2025-07-01 | End: 2025-07-01

## 2025-07-01 RX ORDER — LIDOCAINE HYDROCHLORIDE 40 MG/ML
INJECTION, SOLUTION RETROBULBAR PRN
Status: DISCONTINUED | OUTPATIENT
Start: 2025-07-01 | End: 2025-07-01

## 2025-07-01 RX ORDER — CEFAZOLIN SODIUM/WATER 2 G/20 ML
2 SYRINGE (ML) INTRAVENOUS SEE ADMIN INSTRUCTIONS
Status: DISCONTINUED | OUTPATIENT
Start: 2025-07-01 | End: 2025-07-01 | Stop reason: HOSPADM

## 2025-07-01 RX ORDER — IBUPROFEN 200 MG
400 TABLET ORAL PRN
COMMUNITY

## 2025-07-01 RX ORDER — LIDOCAINE HYDROCHLORIDE 20 MG/ML
INJECTION, SOLUTION INFILTRATION; PERINEURAL PRN
Status: DISCONTINUED | OUTPATIENT
Start: 2025-07-01 | End: 2025-07-01

## 2025-07-01 RX ORDER — FENTANYL CITRATE 50 UG/ML
50 INJECTION, SOLUTION INTRAMUSCULAR; INTRAVENOUS EVERY 5 MIN PRN
Refills: 0 | Status: DISCONTINUED | OUTPATIENT
Start: 2025-07-01 | End: 2025-07-01 | Stop reason: HOSPADM

## 2025-07-01 RX ORDER — ONDANSETRON 2 MG/ML
4 INJECTION INTRAMUSCULAR; INTRAVENOUS EVERY 30 MIN PRN
Status: DISCONTINUED | OUTPATIENT
Start: 2025-07-01 | End: 2025-07-01 | Stop reason: HOSPADM

## 2025-07-01 RX ORDER — OXYCODONE HYDROCHLORIDE 5 MG/1
5-10 TABLET ORAL EVERY 6 HOURS PRN
Qty: 10 TABLET | Refills: 0 | Status: SHIPPED | OUTPATIENT
Start: 2025-07-01

## 2025-07-01 RX ORDER — SODIUM CHLORIDE, SODIUM LACTATE, POTASSIUM CHLORIDE, CALCIUM CHLORIDE 600; 310; 30; 20 MG/100ML; MG/100ML; MG/100ML; MG/100ML
INJECTION, SOLUTION INTRAVENOUS CONTINUOUS
Status: DISCONTINUED | OUTPATIENT
Start: 2025-07-01 | End: 2025-07-01 | Stop reason: HOSPADM

## 2025-07-01 RX ORDER — KETAMINE HYDROCHLORIDE 10 MG/ML
INJECTION INTRAMUSCULAR; INTRAVENOUS PRN
Status: DISCONTINUED | OUTPATIENT
Start: 2025-07-01 | End: 2025-07-01

## 2025-07-01 RX ORDER — HYDROMORPHONE HYDROCHLORIDE 1 MG/ML
0.5 INJECTION, SOLUTION INTRAMUSCULAR; INTRAVENOUS; SUBCUTANEOUS EVERY 5 MIN PRN
Refills: 0 | Status: DISCONTINUED | OUTPATIENT
Start: 2025-07-01 | End: 2025-07-01 | Stop reason: HOSPADM

## 2025-07-01 RX ORDER — OXYCODONE AND ACETAMINOPHEN 5; 325 MG/1; MG/1
2 TABLET ORAL
Status: COMPLETED | OUTPATIENT
Start: 2025-07-01 | End: 2025-07-01

## 2025-07-01 RX ORDER — HYDROMORPHONE HYDROCHLORIDE 1 MG/ML
0.25 INJECTION, SOLUTION INTRAMUSCULAR; INTRAVENOUS; SUBCUTANEOUS EVERY 5 MIN PRN
Refills: 0 | Status: DISCONTINUED | OUTPATIENT
Start: 2025-07-01 | End: 2025-07-01 | Stop reason: HOSPADM

## 2025-07-01 RX ORDER — PROPOFOL 10 MG/ML
INJECTION, EMULSION INTRAVENOUS CONTINUOUS PRN
Status: DISCONTINUED | OUTPATIENT
Start: 2025-07-01 | End: 2025-07-01

## 2025-07-01 RX ORDER — DEXAMETHASONE SODIUM PHOSPHATE 10 MG/ML
4 INJECTION, SOLUTION INTRAMUSCULAR; INTRAVENOUS
Status: DISCONTINUED | OUTPATIENT
Start: 2025-07-01 | End: 2025-07-01 | Stop reason: HOSPADM

## 2025-07-01 RX ORDER — PROPOFOL 10 MG/ML
INJECTION, EMULSION INTRAVENOUS PRN
Status: DISCONTINUED | OUTPATIENT
Start: 2025-07-01 | End: 2025-07-01

## 2025-07-01 RX ORDER — FENTANYL CITRATE 50 UG/ML
25 INJECTION, SOLUTION INTRAMUSCULAR; INTRAVENOUS EVERY 5 MIN PRN
Refills: 0 | Status: DISCONTINUED | OUTPATIENT
Start: 2025-07-01 | End: 2025-07-01 | Stop reason: HOSPADM

## 2025-07-01 RX ORDER — CEFAZOLIN SODIUM/WATER 2 G/20 ML
2 SYRINGE (ML) INTRAVENOUS
Status: COMPLETED | OUTPATIENT
Start: 2025-07-01 | End: 2025-07-01

## 2025-07-01 RX ADMIN — MIDAZOLAM 1 MG: 1 INJECTION INTRAMUSCULAR; INTRAVENOUS at 12:20

## 2025-07-01 RX ADMIN — SODIUM CHLORIDE, SODIUM LACTATE, POTASSIUM CHLORIDE, AND CALCIUM CHLORIDE: .6; .31; .03; .02 INJECTION, SOLUTION INTRAVENOUS at 14:25

## 2025-07-01 RX ADMIN — METHOCARBAMOL 250 MG: 100 INJECTION INTRAMUSCULAR; INTRAVENOUS at 13:51

## 2025-07-01 RX ADMIN — Medication 200 MG: at 14:14

## 2025-07-01 RX ADMIN — PROPOFOL 200 MG: 10 INJECTION, EMULSION INTRAVENOUS at 12:33

## 2025-07-01 RX ADMIN — METHOCARBAMOL 500 MG: 100 INJECTION INTRAMUSCULAR; INTRAVENOUS at 12:57

## 2025-07-01 RX ADMIN — KETOROLAC TROMETHAMINE 30 MG: 30 INJECTION, SOLUTION INTRAMUSCULAR at 13:52

## 2025-07-01 RX ADMIN — Medication 2 G: at 12:25

## 2025-07-01 RX ADMIN — PROPOFOL 100 MCG/KG/MIN: 10 INJECTION, EMULSION INTRAVENOUS at 12:33

## 2025-07-01 RX ADMIN — LIDOCAINE HYDROCHLORIDE 3 ML: 40 INJECTION, SOLUTION RETROBULBAR; TOPICAL at 12:35

## 2025-07-01 RX ADMIN — FENTANYL CITRATE 50 MCG: 50 INJECTION INTRAMUSCULAR; INTRAVENOUS at 13:34

## 2025-07-01 RX ADMIN — Medication 10 MG: at 12:57

## 2025-07-01 RX ADMIN — SODIUM CHLORIDE, SODIUM LACTATE, POTASSIUM CHLORIDE, AND CALCIUM CHLORIDE: .6; .31; .03; .02 INJECTION, SOLUTION INTRAVENOUS at 12:18

## 2025-07-01 RX ADMIN — ROCURONIUM BROMIDE 50 MG: 50 INJECTION, SOLUTION INTRAVENOUS at 12:33

## 2025-07-01 RX ADMIN — FENTANYL CITRATE 50 MCG: 50 INJECTION INTRAMUSCULAR; INTRAVENOUS at 12:20

## 2025-07-01 RX ADMIN — LIDOCAINE HYDROCHLORIDE 50 MG: 20 INJECTION, SOLUTION INFILTRATION; PERINEURAL at 12:33

## 2025-07-01 RX ADMIN — Medication 10 MG: at 13:01

## 2025-07-01 RX ADMIN — OXYCODONE HYDROCHLORIDE AND ACETAMINOPHEN 1 TABLET: 5; 325 TABLET ORAL at 15:19

## 2025-07-01 RX ADMIN — DEXMEDETOMIDINE HYDROCHLORIDE 10 MCG: 100 INJECTION, SOLUTION INTRAVENOUS at 12:20

## 2025-07-01 RX ADMIN — METHOCARBAMOL 250 MG: 100 INJECTION INTRAMUSCULAR; INTRAVENOUS at 13:56

## 2025-07-01 RX ADMIN — ONDANSETRON 4 MG: 2 INJECTION INTRAMUSCULAR; INTRAVENOUS at 13:56

## 2025-07-01 RX ADMIN — HYDROMORPHONE HYDROCHLORIDE 0.5 MG: 1 INJECTION, SOLUTION INTRAMUSCULAR; INTRAVENOUS; SUBCUTANEOUS at 13:04

## 2025-07-01 ASSESSMENT — ACTIVITIES OF DAILY LIVING (ADL)
ADLS_ACUITY_SCORE: 32

## 2025-07-01 NOTE — ANESTHESIA CARE TRANSFER NOTE
Patient: Carlos Keyes    Procedure: Procedure(s):  LAPAROSCOPY, DIAGNOSTIC  robotic right inguinal hernia repair with mesh.       Diagnosis: Right inguinal hernia [K40.90]  Diagnosis Additional Information: No value filed.    Anesthesia Type:   General     Note:    Oropharynx: spontaneously breathing and oropharynx clear of all foreign objects  Level of Consciousness: drowsy  Oxygen Supplementation: nasal cannula  Level of Supplemental Oxygen (L/min / FiO2): 2  Independent Airway: airway patency satisfactory and stable  Dentition: dentition unchanged  Vital Signs Stable: post-procedure vital signs reviewed and stable  Report to RN Given: handoff report given  Patient transferred to: Phase II    Handoff Report: Identifed the Patient, Identified the Reponsible Provider, Reviewed the pertinent medical history, Discussed the surgical course, Reviewed Intra-OP anesthesia mangement and issues during anesthesia, Set expectations for post-procedure period and Allowed opportunity for questions and acknowledgement of understanding      Vitals:  Vitals Value Taken Time   /66    Temp 96.98  F (36.1  C) 07/01/25 14:30   Pulse 72 07/01/25 14:30   Resp 7 07/01/25 14:30   SpO2 96 % 07/01/25 14:30   Vitals shown include unfiled device data.    Electronically Signed By: CIRILO Gabriel CRNA  July 1, 2025  2:31 PM

## 2025-07-01 NOTE — OP NOTE
[facility] Brief Operative Note    Pre-operative diagnosis: Right inguinal hernia [K40.90]   Post-operative diagnosis: Same  Indirect   Procedure: Procedure(s):  LAPAROSCOPY, DIAGNOSTIC  2. robotic right inguinal hernia repair with mesh.   Surgeon: Luciano Jewell MD, FACS   Assistant(s): Cece Oliveira PA-C assistance was required for port placement, bedside robotic instrument exchanges, bedside needs for retraction and suction as needed, introduction/removal of sutures or mesh, and closure     Anesthesia: General endotracheal anesthesia   Estimated blood loss: Less than 10 ml   Total IV fluids: (See anesthesia record)   Blood transfusion: No transfusion was given during surgery   Total urine output: (See anesthesia record)   Drains: None   Specimens: None   Implants: Bard 3D max mesh   Findings: Indirect right inguinal hernia containing preperitoneal fat.  Scarring from previous preperitoneal repair done on the left side 20 years ago.   Complications: None   Condition: Stable   Comments: Indications for procedure: This is a 54-year-old gentleman well-known to me for a left inguinal hernia.  Done preperitoneal approximately 20 years ago.  He then returned with a new hernia that he noticed several months ago on the right side.  Because of the recurrent new hernia on the right he opted to have it repaired.  Is elected to proceed with this with a diagnostic laparoscopy due to probable scarring from her previous preperitoneal repair.     Details of procedure:  Patient was taken the op room placed the table in supine position.  After induction of anesthesia the abdomen was prepped and draped sterile fashion.  Timeout was performed confirm the identity the patient as well as the procedure performed.  A supraumbilical incision then made subcutaneous tissue was opened using S retractors.  The fascia was grasped and a small incision was then made.  A 5 mm Visiport was inserted the abdomen..  A generalized  inspection the ab was then carried out.  There are multiple left lower quadrant omental adhesions to a previously placed preperitoneal mesh.  An indirect right inguinal hernia was readily seen.  There were no right lower quadrant adhesions from the previous procedure so was elected to proceed robotically.  The 5 mm port was then upsized to an 8 mm robotic port and left upper quadrant right upper quadrant 8 mm robotic trocars were placed.  The patient was placed in steep Trendelenburg position.  A Bard 3D max mesh was then placed into the abdomen followed by a 2-0 STRATAFIX suture and a 2-0 Vicryl suture.  The robot was then docked and targeted.  I then went to the console.  The peritoneum at the anterior superior iliac spine was then opened and then this incision was extended medially to near the midline.  An inferior flap was created using the EndoShears.  As you are dissecting there were several areas of scar and thinned out peritoneum from his previous procedure.  Several small tears were made in the peritoneum during this dissection due to the scarring.  Eventually dissected medially down to the pubic tubercle and identifying of the old mesh medially.  The hernia sac itself was then dissected out with EndoShears and found to be quite scarred in.  After freeing it up completely and creating a large enough pocket the Bard 3D max mesh was inserted into the preperitoneal space it was sutured medially to the old mesh anterior the anterior abdominal wall and laterally to the abdominal wall.  The primary peritoneal opening was then closed using a running 2-0 STRATAFIX suture.  It was then elected to repair the small tears in the peritoneum using 2-0 STRATAFIX suture incorporating the peritoneal sac and preperitoneal fat into this closure.  This was started medially in a running fashion.  The preperitoneal fat was then sutured into the small tears in a running fashion running medial to lateral.  The preperitoneal fat  was used as a buttress.  After adequately securing the old peritoneal sac and preperitoneal fat the suture was cut.  All needles were then removed.  The area was inspected there was nones of any bleeding.  The robot was undocked and the patient was placed the level.  The repair looked very good.  The trocars were removed under direct visualization without splaying from the trocar sites.  All skin incisions were then closing with 3-0 Vicryl Dermabond glue.  Patient was taken from the op room to recovery in stable condition to be sent home.    Luciano Jewell MD, FACS

## 2025-07-01 NOTE — ANESTHESIA PROCEDURE NOTES
Airway       Patient location during procedure: OR       Procedure Start/Stop Times: 7/1/2025 12:35 PM  Staff -        CRNA: Chai Matamoros APRN CRNA       Performed By: CRNA  Consent for Airway        Urgency: elective  Indications and Patient Condition       Indications for airway management: chelsey-procedural       Induction type:intravenous       Mask difficulty assessment: 1 - vent by mask    Final Airway Details       Final airway type: endotracheal airway       Successful airway: ETT - single  Endotracheal Airway Details        ETT size (mm): 7.5       Cuffed: yes       Cuff volume (mL): 10       Successful intubation technique: direct laryngoscopy       DL Blade Type: MAC 3       Grade View of Cords: 1       Adjucts: stylet       Position: Right       Measured from: lips       Secured at (cm): 27       Bite block used: Oral Airway    Post intubation assessment        Placement verified by: capnometry, equal breath sounds and chest rise        Number of attempts at approach: 1       Number of other approaches attempted: 0       Secured with: tape       Ease of procedure: easy       Dentition: Intact and Unchanged    Medication(s) Administered   Medication Administration Time: 7/1/2025 12:35 PM

## 2025-07-01 NOTE — DISCHARGE INSTRUCTIONS
[facility] Same-Day Surgery   Adult Discharge Orders & Instructions     For 24 hours after surgery    Get plenty of rest.  A responsible adult must stay with you for at least 24 hours after you leave the hospital.   Do not drive or use heavy equipment.  If you have weakness or tingling, don't drive or use heavy equipment until this feeling goes away.  Do not drink alcohol.  Avoid strenuous or risky activities.  Ask for help when climbing stairs.   You may feel lightheaded.  If so, sit for a few minutes before standing.  Have someone help you get up.   You may have a slight fever. Call the doctor if your fever is over 100 F (37.7 C) (taken under the tongue) or lasts longer than 24 hours.  You may have a dry mouth, a sore throat, muscle aches or trouble sleeping.  These should go away after 24 hours.  Do not make important or legal decisions.  We don t expect you to have any problems from the surgery or treatment you had today. Just in case, here s what to do if you have pain, upset stomach (nausea), bleeding or infection:  Pain:  Take medicines your doctor has prescribed or over-the-counter medicine they have suggested. Resting and using ice packs can help, too. For surgery on an arm or leg, raise it on a pillow to ease swelling. Call your doctor if these methods don t work.  Copyright [employer]aush[employer] Dolan, Licensed under [employer]  Upset stomach (nausea):  Take anti-nausea medicine approved by your doctor. Drink clear liquids like apple juice, ginger ale, broth or 7-Up. Be sure to drink enough fluids. Rest can help, too. Move to normal foods when you re ready.   Bleeding:  In the first 24 hours, you may see a little blood on your dressing, about the size of a quarter. You don t need to worry about this much blood, but if the blood spot keeps getting bigger:  Put pressure on the wound if you can, AND  Call your doctor.  Copyright [employer], Licensed under [employer]  Fever/Infection: Please call  your doctor if you have any of these signs:  Redness  Swelling  Wound feels warm  Pain gets worse  Bad-smelling fluid leaks from wound  Fever or chills  Call your doctor for any of the followin.  It has been over 8 to 10 hours since surgery and you are still not able to urinate (pass water).    2.  Headache for over 24 hours.    3.  Numbness, tingling or weakness in your legs the day after surgery (if you had spinal anesthesia).    For patient questions :    General Surgery: [phone]

## 2025-07-02 NOTE — ANESTHESIA POSTPROCEDURE EVALUATION
Patient: Carlos Keyes    Procedure: Procedure(s):  LAPAROSCOPY, DIAGNOSTIC  robotic right inguinal hernia repair with mesh.       Anesthesia Type:  General    Note:  Disposition: Outpatient   Postop Pain Control: Uneventful            Sign Out: Well controlled pain   PONV: No   Neuro/Psych: Uneventful            Sign Out: Acceptable/Baseline neuro status   Airway/Respiratory: Uneventful            Sign Out: Acceptable/Baseline resp. status   CV/Hemodynamics: Uneventful            Sign Out: Acceptable CV status; No obvious hypovolemia; No obvious fluid overload   Other NRE: NONE   DID A NON-ROUTINE EVENT OCCUR? No           Last vitals:  Vitals Value Taken Time   /67 07/01/25 14:55   Temp 96.44  F (35.8  C) 07/01/25 14:56   Pulse 68 07/01/25 14:56   Resp 9 07/01/25 14:56   SpO2 94 % 07/01/25 14:56   Vitals shown include unfiled device data.    Electronically Signed By: CIRILO Gabriel CRNA  July 1, 2025  7:35 PM

## 2025-07-10 ENCOUNTER — OFFICE VISIT (OUTPATIENT)
Dept: SURGERY | Facility: CLINIC | Age: 55
End: 2025-07-10
Payer: COMMERCIAL

## 2025-07-10 VITALS
HEIGHT: 75 IN | OXYGEN SATURATION: 99 % | HEART RATE: 92 BPM | SYSTOLIC BLOOD PRESSURE: 126 MMHG | DIASTOLIC BLOOD PRESSURE: 80 MMHG | TEMPERATURE: 98 F | WEIGHT: 248 LBS | BODY MASS INDEX: 30.84 KG/M2

## 2025-07-10 DIAGNOSIS — Z09 POSTOP CHECK: Primary | ICD-10-CM

## 2025-07-10 ASSESSMENT — PAIN SCALES - GENERAL: PAINLEVEL_OUTOF10: NO PAIN (0)

## 2025-07-10 NOTE — LETTER
7/10/2025      Carlos Keyes  [address]  [facility] [address]      Dear Colleague,    Thank you for referring your patient, Carlos Keyes, to the [facility]. Please see a copy of my visit note below.    Follow-up for robotic right inguinal hernia    Subjective:  Patient feels good as far as the hernia is concerned.  Caught his left hand in a boat dock with a large laceration.  No groin complaints      Objective:  B/P: 126/80, T: 98, P: 92, R: Data Unavailable  Abdomen: Incisions healing well.  Hernia repair intact.    Assessment/plan:  Patient is status post robotically assisted right inguinal hernia repair.  Doing well.  Hernia repair intact.  He will gradually increase his activity and follow-up with me as necessary.    Luciano Jewell MD, FACS    Again, thank you for allowing me to participate in the care of your patient.        Sincerely,        Luciano Jewell MD    Electronically signed

## 2025-07-10 NOTE — PROGRESS NOTES
Follow-up for robotic right inguinal hernia    Subjective:  Patient feels good as far as the hernia is concerned.  Caught his left hand in a boat dock with a large laceration.  No groin complaints      Objective:  B/P: 126/80, T: 98, P: 92, R: Data Unavailable  Abdomen: Incisions healing well.  Hernia repair intact.    Assessment/plan:  Patient is status post robotically assisted right inguinal hernia repair.  Doing well.  Hernia repair intact.  He will gradually increase his activity and follow-up with me as necessary.    Luciano Jewell MD, FACS

## 2025-07-31 ENCOUNTER — MYC MEDICAL ADVICE (OUTPATIENT)
Dept: FAMILY MEDICINE | Facility: CLINIC | Age: 55
End: 2025-07-31
Payer: COMMERCIAL

## 2025-08-05 ENCOUNTER — OFFICE VISIT (OUTPATIENT)
Dept: FAMILY MEDICINE | Facility: CLINIC | Age: 55
End: 2025-08-05
Payer: COMMERCIAL

## 2025-08-05 VITALS
TEMPERATURE: 98.3 F | RESPIRATION RATE: 18 BRPM | SYSTOLIC BLOOD PRESSURE: 130 MMHG | DIASTOLIC BLOOD PRESSURE: 82 MMHG | OXYGEN SATURATION: 97 % | WEIGHT: 247 LBS | HEART RATE: 68 BPM | BODY MASS INDEX: 30.71 KG/M2 | HEIGHT: 75 IN

## 2025-08-05 DIAGNOSIS — S61.412D LACERATION OF LEFT HAND WITHOUT FOREIGN BODY, SUBSEQUENT ENCOUNTER: Primary | ICD-10-CM

## 2025-08-05 PROCEDURE — 1126F AMNT PAIN NOTED NONE PRSNT: CPT | Performed by: NURSE PRACTITIONER

## 2025-08-05 PROCEDURE — 3075F SYST BP GE 130 - 139MM HG: CPT | Performed by: NURSE PRACTITIONER

## 2025-08-05 PROCEDURE — 99212 OFFICE O/P EST SF 10 MIN: CPT | Performed by: NURSE PRACTITIONER

## 2025-08-05 PROCEDURE — 3079F DIAST BP 80-89 MM HG: CPT | Performed by: NURSE PRACTITIONER

## 2025-08-05 ASSESSMENT — PAIN SCALES - GENERAL: PAINLEVEL_OUTOF10: NO PAIN (0)

## (undated) DEVICE — SU VICRYL 2-0 SH 27" UND J417H

## (undated) DEVICE — SOL NACL 0.9% IRRIG 3000ML BAG 07972-08

## (undated) DEVICE — ENDO TROCAR FIRST ENTRY KII FIOS Z-THRD 05X100MM CTF03

## (undated) DEVICE — GLOVE PROTEXIS BLUE W/NEU-THERA 8.5  2D73EB85

## (undated) DEVICE — ESU GROUND PAD UNIVERSAL W/O CORD

## (undated) DEVICE — GLOVE BIOGEL PI SZ 7.5 40875

## (undated) DEVICE — DRAPE U SPLIT 74X120" 29440

## (undated) DEVICE — SU MONOCRYL 4-0 PS-2 18" UND Y496G

## (undated) DEVICE — SU ETHILON 3-0 PS-2 18" 1669H

## (undated) DEVICE — GLOVE BIOGEL PI MICRO INDICATOR UNDERGLOVE SZ 7.5 48975

## (undated) DEVICE — CATH TRAY FOLEY SURESTEP 16FR DRAIN BAG STATOCK A899916

## (undated) DEVICE — GLOVE BIOGEL PI MICRO SZ 8.0 48580

## (undated) DEVICE — STOCKING SLEEVE COMPRESSION CALF MED

## (undated) DEVICE — BNDG ESMARK 6" STERILE

## (undated) DEVICE — BNDG ELASTIC 6"X5YDS UNSTERILE 6611-60

## (undated) DEVICE — PACK GENERAL LAPAOSCOPY

## (undated) DEVICE — GOWN XLG DISP 9545

## (undated) DEVICE — CAST PADDING 6" WEBRIL UNSTERILE 3489

## (undated) DEVICE — SU PROLENE 3-0 FS-2 18" 8665G

## (undated) DEVICE — ANTIFOG SOLUTION SEE SHARP 150M TROCAR SWABS 30978

## (undated) DEVICE — PREP CHLORAPREP 26ML TINTED ORANGE  260815

## (undated) DEVICE — DRAPE MAYO STAND 23X54 8337

## (undated) DEVICE — PACK ARTHROSCOPY KNEE

## (undated) DEVICE — SOLUTION WATER 1000ML BOTTLE R5000-01

## (undated) DEVICE — BNDG ELASTIC 6" DBL LENGTH UNSTERILE 6611-16

## (undated) DEVICE — DRAPE STERI U 1015

## (undated) DEVICE — DAVINCI XI DRAPE COLUMN 470341

## (undated) DEVICE — DRAPE SHEET REV FOLD 3/4 9349

## (undated) DEVICE — DAVINCI XI DRAPE ARM 470015

## (undated) DEVICE — GLOVE PROTEXIS POWDER FREE 8.0 ORTHOPEDIC 2D73ET80

## (undated) DEVICE — GLOVE PROTEXIS W/NEU-THERA 8.0  2D73TE80

## (undated) DEVICE — DAVINCI XI SEAL UNIVERSAL 5-12MM 470500

## (undated) DEVICE — SU DERMABOND ADVANCED .7ML DNX12

## (undated) DEVICE — DAVINCI XI OBTURATOR BLADELESS 8MM 470359

## (undated) DEVICE — TUBING INFLOW & OUTFLOW INTEGRATED CROSSFLOW 0450-000-300

## (undated) DEVICE — GLOVE BIOGEL PI ULTRATOUCH G SZ 7.5 42175

## (undated) DEVICE — Device

## (undated) DEVICE — BLADE SHAVER ARTHRO 4MM TOMCAT

## (undated) DEVICE — KIT POSITIONER TRENDELENBURG NUBLUE TP3000-NB

## (undated) DEVICE — DRSG GAUZE 4X4" TRAY

## (undated) DEVICE — DRSG STERI STRIP 1/2X4" R1547

## (undated) DEVICE — GLOVE BIOGEL PI MICRO INDICATOR UNDERGLOVE SZ 8.0 48980

## (undated) DEVICE — BLADE SHVR 13CM 3.8MM EXCLBR COOLCUT STRL DISP AR-8380EX

## (undated) DEVICE — GLOVE BIOGEL PI MICRO SZ 7.5 48575

## (undated) DEVICE — TUBING ARTHROSCOPY PUMP ARTHREX AR-6410

## (undated) DEVICE — SOL WATER IRRIG 1000ML BOTTLE 07139-09

## (undated) DEVICE — SU STRATAFIX PDS PLUS 2-0 SPIRAL SH 23CM SXPP1B433

## (undated) DEVICE — SUCTION MANIFOLD NEPTUNE 2 SYS 4 PORT 0702-020-000

## (undated) DEVICE — GLOVE UNDER INDICATOR PI SZ 6.5 LF 41665

## (undated) DEVICE — DRSG KERLIX 4 1/2"X4YDS ROLL 6730

## (undated) DEVICE — PACK KNEE ARTHROSCOPY CUSTOM

## (undated) RX ORDER — SCOLOPAMINE TRANSDERMAL SYSTEM 1 MG/1
PATCH, EXTENDED RELEASE TRANSDERMAL
Status: DISPENSED
Start: 2018-07-25

## (undated) RX ORDER — METHOCARBAMOL 100 MG/ML
INJECTION, SOLUTION INTRAMUSCULAR; INTRAVENOUS
Status: DISPENSED
Start: 2025-07-01

## (undated) RX ORDER — KETOROLAC TROMETHAMINE 30 MG/ML
INJECTION, SOLUTION INTRAMUSCULAR; INTRAVENOUS
Status: DISPENSED
Start: 2025-07-01

## (undated) RX ORDER — ACETAMINOPHEN 325 MG/1
TABLET ORAL
Status: DISPENSED
Start: 2023-12-19

## (undated) RX ORDER — HYDROMORPHONE HYDROCHLORIDE 1 MG/ML
INJECTION, SOLUTION INTRAMUSCULAR; INTRAVENOUS; SUBCUTANEOUS
Status: DISPENSED
Start: 2018-07-25

## (undated) RX ORDER — PROPOFOL 10 MG/ML
INJECTION, EMULSION INTRAVENOUS
Status: DISPENSED
Start: 2025-07-01

## (undated) RX ORDER — LIDOCAINE HYDROCHLORIDE 10 MG/ML
INJECTION, SOLUTION EPIDURAL; INFILTRATION; INTRACAUDAL; PERINEURAL
Status: DISPENSED
Start: 2025-07-01

## (undated) RX ORDER — DEXAMETHASONE SODIUM PHOSPHATE 10 MG/ML
INJECTION INTRAMUSCULAR; INTRAVENOUS
Status: DISPENSED
Start: 2018-07-25

## (undated) RX ORDER — BUPIVACAINE HYDROCHLORIDE 2.5 MG/ML
INJECTION, SOLUTION EPIDURAL; INFILTRATION; INTRACAUDAL
Status: DISPENSED
Start: 2023-12-19

## (undated) RX ORDER — FENTANYL CITRATE 50 UG/ML
INJECTION, SOLUTION INTRAMUSCULAR; INTRAVENOUS
Status: DISPENSED
Start: 2023-12-19

## (undated) RX ORDER — FENTANYL CITRATE 50 UG/ML
INJECTION, SOLUTION INTRAMUSCULAR; INTRAVENOUS
Status: DISPENSED
Start: 2018-07-25

## (undated) RX ORDER — PROPOFOL 10 MG/ML
INJECTION, EMULSION INTRAVENOUS
Status: DISPENSED
Start: 2023-12-19

## (undated) RX ORDER — DEXAMETHASONE SODIUM PHOSPHATE 4 MG/ML
INJECTION, SOLUTION INTRA-ARTICULAR; INTRALESIONAL; INTRAMUSCULAR; INTRAVENOUS; SOFT TISSUE
Status: DISPENSED
Start: 2023-12-19

## (undated) RX ORDER — LIDOCAINE HYDROCHLORIDE 20 MG/ML
INJECTION, SOLUTION EPIDURAL; INFILTRATION; INTRACAUDAL; PERINEURAL
Status: DISPENSED
Start: 2018-07-25

## (undated) RX ORDER — CEFAZOLIN SODIUM/WATER 2 G/20 ML
SYRINGE (ML) INTRAVENOUS
Status: DISPENSED
Start: 2023-12-19

## (undated) RX ORDER — KETOROLAC TROMETHAMINE 30 MG/ML
INJECTION, SOLUTION INTRAMUSCULAR; INTRAVENOUS
Status: DISPENSED
Start: 2018-07-25

## (undated) RX ORDER — CEFAZOLIN SODIUM 1 G/3ML
INJECTION, POWDER, FOR SOLUTION INTRAMUSCULAR; INTRAVENOUS
Status: DISPENSED
Start: 2025-07-01

## (undated) RX ORDER — HYDROMORPHONE HYDROCHLORIDE 1 MG/ML
INJECTION, SOLUTION INTRAMUSCULAR; INTRAVENOUS; SUBCUTANEOUS
Status: DISPENSED
Start: 2025-07-01

## (undated) RX ORDER — FENTANYL CITRATE 50 UG/ML
INJECTION, SOLUTION INTRAMUSCULAR; INTRAVENOUS
Status: DISPENSED
Start: 2025-07-01

## (undated) RX ORDER — BUPIVACAINE HYDROCHLORIDE AND EPINEPHRINE 2.5; 5 MG/ML; UG/ML
INJECTION, SOLUTION EPIDURAL; INFILTRATION; INTRACAUDAL; PERINEURAL
Status: DISPENSED
Start: 2025-07-01

## (undated) RX ORDER — BUPIVACAINE HYDROCHLORIDE 5 MG/ML
INJECTION, SOLUTION EPIDURAL; INTRACAUDAL
Status: DISPENSED
Start: 2018-07-25

## (undated) RX ORDER — GABAPENTIN 300 MG/1
CAPSULE ORAL
Status: DISPENSED
Start: 2023-12-19

## (undated) RX ORDER — EPINEPHRINE 1 MG/ML
INJECTION, SOLUTION, CONCENTRATE INTRAVENOUS
Status: DISPENSED
Start: 2018-07-25

## (undated) RX ORDER — ONDANSETRON 2 MG/ML
INJECTION INTRAMUSCULAR; INTRAVENOUS
Status: DISPENSED
Start: 2023-12-19

## (undated) RX ORDER — PROPOFOL 10 MG/ML
INJECTION, EMULSION INTRAVENOUS
Status: DISPENSED
Start: 2018-07-25

## (undated) RX ORDER — ONDANSETRON 2 MG/ML
INJECTION INTRAMUSCULAR; INTRAVENOUS
Status: DISPENSED
Start: 2018-07-25